# Patient Record
Sex: MALE | Race: WHITE | URBAN - METROPOLITAN AREA
[De-identification: names, ages, dates, MRNs, and addresses within clinical notes are randomized per-mention and may not be internally consistent; named-entity substitution may affect disease eponyms.]

---

## 2018-10-17 ENCOUNTER — APPOINTMENT (OUTPATIENT)
Age: 83
Setting detail: DERMATOLOGY
End: 2018-11-17

## 2018-10-17 DIAGNOSIS — L57.0 ACTINIC KERATOSIS: ICD-10-CM

## 2018-10-17 DIAGNOSIS — Z86.007 PERSONAL HISTORY OF IN-SITU NEOPLASM OF SKIN: ICD-10-CM

## 2018-10-17 DIAGNOSIS — Z71.89 OTHER SPECIFIED COUNSELING: ICD-10-CM

## 2018-10-17 PROBLEM — Z85.828 PERSONAL HISTORY OF OTHER MALIGNANT NEOPLASM OF SKIN: Status: ACTIVE | Noted: 2018-10-17

## 2018-10-17 PROBLEM — D48.5 NEOPLASM OF UNCERTAIN BEHAVIOR OF SKIN: Status: ACTIVE | Noted: 2018-10-17

## 2018-10-17 PROCEDURE — OTHER BIOPSY BY SHAVE METHOD: OTHER

## 2018-10-17 PROCEDURE — OTHER COUNSELING: OTHER

## 2018-10-17 PROCEDURE — 11100: CPT | Mod: 59

## 2018-10-17 PROCEDURE — 17003 DESTRUCT PREMALG LES 2-14: CPT

## 2018-10-17 PROCEDURE — OTHER DIAGNOSIS COMMENT: OTHER

## 2018-10-17 PROCEDURE — OTHER LIQUID NITROGEN: OTHER

## 2018-10-17 PROCEDURE — OTHER MIPS QUALITY: OTHER

## 2018-10-17 PROCEDURE — 11101: CPT

## 2018-10-17 PROCEDURE — 99213 OFFICE O/P EST LOW 20 MIN: CPT | Mod: 25

## 2018-10-17 PROCEDURE — 17000 DESTRUCT PREMALG LESION: CPT

## 2018-10-17 ASSESSMENT — LOCATION SIMPLE DESCRIPTION DERM
LOCATION SIMPLE: ANTERIOR SCALP
LOCATION SIMPLE: POSTERIOR SCALP
LOCATION SIMPLE: LEFT FOREHEAD
LOCATION SIMPLE: LEFT CHEEK
LOCATION SIMPLE: RIGHT TEMPLE
LOCATION SIMPLE: RIGHT FOREHEAD
LOCATION SIMPLE: RIGHT CHEEK
LOCATION SIMPLE: SCALP
LOCATION SIMPLE: LEFT SCALP
LOCATION SIMPLE: INFERIOR FOREHEAD

## 2018-10-17 ASSESSMENT — LOCATION DETAILED DESCRIPTION DERM
LOCATION DETAILED: RIGHT INFERIOR LATERAL MALAR CHEEK
LOCATION DETAILED: LEFT MEDIAL FRONTAL SCALP
LOCATION DETAILED: RIGHT SUPERIOR CENTRAL MALAR CHEEK
LOCATION DETAILED: RIGHT FOREHEAD
LOCATION DETAILED: POSTERIOR MID-PARIETAL SCALP
LOCATION DETAILED: LEFT CENTRAL MALAR CHEEK
LOCATION DETAILED: RIGHT CENTRAL TEMPLE
LOCATION DETAILED: LEFT INFERIOR FOREHEAD
LOCATION DETAILED: INFERIOR MID FOREHEAD
LOCATION DETAILED: RIGHT SUPERIOR PARIETAL SCALP
LOCATION DETAILED: MID-FRONTAL SCALP

## 2018-10-17 ASSESSMENT — LOCATION ZONE DERM
LOCATION ZONE: SCALP
LOCATION ZONE: FACE

## 2018-10-17 NOTE — PROCEDURE: MIPS QUALITY
Detail Level: Detailed
Quality 265: Biopsy Follow-Up: Biopsy results reviewed, communicated, tracked, and documented
Quality 130: Documentation Of Current Medications In The Medical Record: Current Medications Documented
Quality 226: Preventive Care And Screening: Tobacco Use: Screening And Cessation Intervention: Patient screened for tobacco and is an ex-smoker
Quality 110: Preventive Care And Screening: Influenza Immunization: Influenza Immunization previously received during influenza season
Quality 431: Preventive Care And Screening: Unhealthy Alcohol Use - Screening: Patient screened for unhealthy alcohol use using a single question and scores less than 2 times per year
Quality 131: Pain Assessment And Follow-Up: Pain assessment using a standardized tool is documented as negative, no follow-up plan required

## 2018-10-17 NOTE — PROCEDURE: BIOPSY BY SHAVE METHOD
Cryotherapy Text: The wound bed was treated with cryotherapy after the biopsy was performed.
Silver Nitrate Text: The wound bed was treated with silver nitrate after the biopsy was performed.
Size Of Lesion In Cm: 1
Billing Type: Third-Party Bill
Dressing: pressure dressing with telfa
Anesthesia Type: 1% lidocaine with epinephrine and a 1:10 solution of 8.4% sodium bicarbonate
Curettage Text: The wound bed was treated with curettage after the biopsy was performed.
Additional Anesthesia Volume In Cc (Will Not Render If 0): 0
Biopsy Type: H and E
Detail Level: Detailed
Bill For Surgical Tray: no
Body Location Override (Optional - Billing Will Still Be Based On Selected Body Map Location If Applicable): mid frontal scalp
Electrodesiccation Text: The wound bed was treated with electrodesiccation after the biopsy was performed.
Electrodesiccation And Curettage Text: The wound bed was treated with electrodesiccation and curettage after the biopsy was performed.
Depth Of Biopsy: dermis
Wound Care: Petrolatum
Consent: Verbal consent was obtained and risks were reviewed including but not limited to scarring, infection, bleeding, scabbing, incomplete removal, nerve damage and allergy to anesthesia.
Post-Care Instructions: I verbally reviewed with the patient in detail post-care instructions. Patient is to keep the biopsy site dry overnight, and then apply H2O2, Vaseline and a bandage daily until healed.
Body Location Override (Optional - Billing Will Still Be Based On Selected Body Map Location If Applicable): left mid frontal scalp
Was A Bandage Applied: Yes
Size Of Lesion In Cm: 1.2
Biopsy Method: double edge Personna blade
Anesthesia Volume In Cc (Will Not Render If 0): 0.5
Type Of Destruction Used: Electrodesiccation
Notification Instructions: Patient will be notified of biopsy results. However, patient instructed to call the office if not contacted within 2 weeks.
Hemostasis: Electrocautery
Body Location Override (Optional - Billing Will Still Be Based On Selected Body Map Location If Applicable): right vertex scalp
Size Of Lesion In Cm: 1.3

## 2018-10-17 NOTE — PROCEDURE: LIQUID NITROGEN
Duration Of Freeze Thaw-Cycle (Seconds): 10
Render Post-Care Instructions In Note?: yes
Detail Level: Detailed
Number Of Freeze-Thaw Cycles: 1 freeze-thaw cycle
Post-Care Instructions: I reviewed with the patient in detail post-care instructions. Patient is to wear sunprotection, and avoid picking at any of the treated lesions. Pt may apply Vaseline to crusted or scabbing areas.
Consent: The patient's consent was obtained including but not limited to risks of crusting, scabbing, blistering, scarring, darker or lighter pigmentary change, recurrence, incomplete removal and infection.

## 2018-12-17 ENCOUNTER — APPOINTMENT (OUTPATIENT)
Age: 83
Setting detail: DERMATOLOGY
End: 2019-01-01

## 2018-12-17 DIAGNOSIS — L57.0 ACTINIC KERATOSIS: ICD-10-CM

## 2018-12-17 DIAGNOSIS — L82.1 OTHER SEBORRHEIC KERATOSIS: ICD-10-CM

## 2018-12-17 PROCEDURE — OTHER DIAGNOSIS COMMENT: OTHER

## 2018-12-17 PROCEDURE — 99212 OFFICE O/P EST SF 10 MIN: CPT | Mod: 25

## 2018-12-17 PROCEDURE — 17000 DESTRUCT PREMALG LESION: CPT

## 2018-12-17 PROCEDURE — 17003 DESTRUCT PREMALG LES 2-14: CPT

## 2018-12-17 PROCEDURE — OTHER COUNSELING: OTHER

## 2018-12-17 PROCEDURE — OTHER MIPS QUALITY: OTHER

## 2018-12-17 PROCEDURE — OTHER LIQUID NITROGEN: OTHER

## 2018-12-17 ASSESSMENT — LOCATION DETAILED DESCRIPTION DERM
LOCATION DETAILED: MID-FRONTAL SCALP
LOCATION DETAILED: RIGHT SUPERIOR PARIETAL SCALP
LOCATION DETAILED: LEFT SUPERIOR MEDIAL FOREHEAD
LOCATION DETAILED: LEFT MEDIAL FRONTAL SCALP
LOCATION DETAILED: LEFT OCCIPITAL SCALP
LOCATION DETAILED: RIGHT SUPERIOR MEDIAL FOREHEAD
LOCATION DETAILED: LEFT SUPERIOR PARIETAL SCALP
LOCATION DETAILED: RIGHT CENTRAL PARIETAL SCALP
LOCATION DETAILED: MID-OCCIPITAL SCALP

## 2018-12-17 ASSESSMENT — LOCATION SIMPLE DESCRIPTION DERM
LOCATION SIMPLE: POSTERIOR SCALP
LOCATION SIMPLE: SCALP
LOCATION SIMPLE: RIGHT FOREHEAD
LOCATION SIMPLE: LEFT FOREHEAD
LOCATION SIMPLE: ANTERIOR SCALP
LOCATION SIMPLE: LEFT SCALP

## 2018-12-17 ASSESSMENT — LOCATION ZONE DERM
LOCATION ZONE: FACE
LOCATION ZONE: SCALP

## 2018-12-17 NOTE — PROCEDURE: LIQUID NITROGEN
Total Number Of Aks Treated: 10
Number Of Freeze-Thaw Cycles: 1 freeze-thaw cycle
Post-Care Instructions: I reviewed with the patient in detail post-care instructions. (Written instructions given) Patient is to wear sun protection, and avoid picking at any of the treated lesions. Pt may apply Vaseline to crusted or scabbing areas.
Render Post-Care Instructions In Note?: yes
Consent: The patient's consent was obtained including but not limited to risks of crusting, scabbing, blistering, scarring, darker or lighter pigmentary change, recurrence, incomplete removal and infection.
Detail Level: Detailed

## 2018-12-17 NOTE — PROCEDURE: MIPS QUALITY
Quality 226: Preventive Care And Screening: Tobacco Use: Screening And Cessation Intervention: Patient screened for tobacco and never smoked
Quality 131: Pain Assessment And Follow-Up: Pain assessment documented as positive using a standardized tool AND a follow-up plan is documented
Detail Level: Detailed
Quality 431: Preventive Care And Screening: Unhealthy Alcohol Use - Screening: Patient screened for unhealthy alcohol use using a single question and scores less than 2 times per year
Quality 110: Preventive Care And Screening: Influenza Immunization: Influenza Immunization previously received during influenza season
Quality 130: Documentation Of Current Medications In The Medical Record: Current Medications Documented

## 2019-06-10 ENCOUNTER — APPOINTMENT (OUTPATIENT)
Age: 84
Setting detail: DERMATOLOGY
End: 2019-06-30

## 2019-06-10 DIAGNOSIS — L82.1 OTHER SEBORRHEIC KERATOSIS: ICD-10-CM

## 2019-06-10 DIAGNOSIS — L81.4 OTHER MELANIN HYPERPIGMENTATION: ICD-10-CM

## 2019-06-10 DIAGNOSIS — D18.0 HEMANGIOMA: ICD-10-CM

## 2019-06-10 DIAGNOSIS — L57.0 ACTINIC KERATOSIS: ICD-10-CM

## 2019-06-10 DIAGNOSIS — Z71.89 OTHER SPECIFIED COUNSELING: ICD-10-CM

## 2019-06-10 DIAGNOSIS — Z85.828 PERSONAL HISTORY OF OTHER MALIGNANT NEOPLASM OF SKIN: ICD-10-CM

## 2019-06-10 DIAGNOSIS — B35.1 TINEA UNGUIUM: ICD-10-CM

## 2019-06-10 DIAGNOSIS — L21.8 OTHER SEBORRHEIC DERMATITIS: ICD-10-CM

## 2019-06-10 DIAGNOSIS — D22 MELANOCYTIC NEVI: ICD-10-CM

## 2019-06-10 DIAGNOSIS — B35.3 TINEA PEDIS: ICD-10-CM

## 2019-06-10 PROBLEM — D22.5 MELANOCYTIC NEVI OF TRUNK: Status: ACTIVE | Noted: 2019-06-10

## 2019-06-10 PROBLEM — D18.01 HEMANGIOMA OF SKIN AND SUBCUTANEOUS TISSUE: Status: ACTIVE | Noted: 2019-06-10

## 2019-06-10 PROBLEM — D48.5 NEOPLASM OF UNCERTAIN BEHAVIOR OF SKIN: Status: ACTIVE | Noted: 2019-06-10

## 2019-06-10 PROCEDURE — OTHER PRESCRIPTION: OTHER

## 2019-06-10 PROCEDURE — 11103 TANGNTL BX SKIN EA SEP/ADDL: CPT

## 2019-06-10 PROCEDURE — 99214 OFFICE O/P EST MOD 30 MIN: CPT | Mod: 25

## 2019-06-10 PROCEDURE — OTHER BIOPSY BY SHAVE METHOD: OTHER

## 2019-06-10 PROCEDURE — OTHER COUNSELING: OTHER

## 2019-06-10 PROCEDURE — OTHER MIPS QUALITY: OTHER

## 2019-06-10 PROCEDURE — OTHER LIQUID NITROGEN: OTHER

## 2019-06-10 PROCEDURE — 11102 TANGNTL BX SKIN SINGLE LES: CPT

## 2019-06-10 PROCEDURE — 17003 DESTRUCT PREMALG LES 2-14: CPT

## 2019-06-10 PROCEDURE — OTHER DIAGNOSIS COMMENT: OTHER

## 2019-06-10 PROCEDURE — 17000 DESTRUCT PREMALG LESION: CPT | Mod: 59

## 2019-06-10 RX ORDER — HYDROCORTISONE 25 MG/G
APPLY THIN COAT CREAM TOPICAL BID, PRN
Qty: 20 | Refills: 1 | Status: ERX | COMMUNITY
Start: 2019-06-10

## 2019-06-10 ASSESSMENT — LOCATION SIMPLE DESCRIPTION DERM
LOCATION SIMPLE: LEFT GREAT TOE
LOCATION SIMPLE: LEFT ANTERIOR NECK
LOCATION SIMPLE: LEFT CHEEK
LOCATION SIMPLE: RIGHT UPPER BACK
LOCATION SIMPLE: POSTERIOR SCALP
LOCATION SIMPLE: UPPER BACK
LOCATION SIMPLE: RIGHT CHEEK
LOCATION SIMPLE: LEFT PLANTAR SURFACE
LOCATION SIMPLE: RIGHT GREAT TOE
LOCATION SIMPLE: RIGHT SCALP
LOCATION SIMPLE: RIGHT PLANTAR SURFACE
LOCATION SIMPLE: RIGHT LIP
LOCATION SIMPLE: SCALP

## 2019-06-10 ASSESSMENT — LOCATION DETAILED DESCRIPTION DERM
LOCATION DETAILED: RIGHT SUPERIOR MEDIAL UPPER BACK
LOCATION DETAILED: LEFT CENTRAL MALAR CHEEK
LOCATION DETAILED: RIGHT CENTRAL PARIETAL SCALP
LOCATION DETAILED: RIGHT INFERIOR MEDIAL MALAR CHEEK
LOCATION DETAILED: RIGHT PLANTAR FOREFOOT OVERLYING 2ND METATARSAL
LOCATION DETAILED: LEFT PLANTAR FOREFOOT OVERLYING 3RD METATARSAL
LOCATION DETAILED: SUPERIOR THORACIC SPINE
LOCATION DETAILED: RIGHT SUPERIOR PARIETAL SCALP
LOCATION DETAILED: RIGHT INFERIOR VERMILION LIP
LOCATION DETAILED: RIGHT INFERIOR CENTRAL MALAR CHEEK
LOCATION DETAILED: RIGHT SUPERIOR OCCIPITAL SCALP
LOCATION DETAILED: RIGHT MEDIAL FRONTAL SCALP
LOCATION DETAILED: RIGHT MEDIAL MALAR CHEEK
LOCATION DETAILED: MID-OCCIPITAL SCALP
LOCATION DETAILED: LEFT CLAVICULAR NECK
LOCATION DETAILED: RIGHT GREAT TOENAIL
LOCATION DETAILED: LEFT MEDIAL MALAR CHEEK
LOCATION DETAILED: LEFT GREAT TOENAIL
LOCATION DETAILED: LEFT SUPERIOR LATERAL MALAR CHEEK

## 2019-06-10 ASSESSMENT — LOCATION ZONE DERM
LOCATION ZONE: TRUNK
LOCATION ZONE: LIP
LOCATION ZONE: TOENAIL
LOCATION ZONE: FEET
LOCATION ZONE: NECK
LOCATION ZONE: FACE
LOCATION ZONE: SCALP

## 2019-06-10 NOTE — PROCEDURE: DIAGNOSIS COMMENT
Comment: Procedure was performed by Dr. Hayley Cho, DNP APRN NP-C
Comment: Procedure was performed by Dr. Hayley Cho, DNP APRN NP-C
Detail Level: Simple
Comment: Procedure was performed by Dr. Hayley Cho, DNP APRN NP-C

## 2019-06-10 NOTE — PROCEDURE: BIOPSY BY SHAVE METHOD
Was A Bandage Applied: Yes
Anesthesia Type: 1% lidocaine with epinephrine and a 1:10 solution of 8.4% sodium bicarbonate
Anesthesia Volume In Cc (Will Not Render If 0): 0.5
Type Of Destruction Used: Electrodesiccation
Billing Type: Third-Party Bill
Bill 80335 For Specimen Handling/Conveyance To Laboratory?: no
Depth Of Biopsy: dermis
Post-Care Instructions: I verbally reviewed with the patient in detail post-care instructions. Patient is to keep the biopsy site dry overnight, and then apply H2O2, Vaseline and a bandage daily until healed.
Curettage Text: The wound bed was treated with curettage after the biopsy was performed.
Cryotherapy Text: The wound bed was treated with cryotherapy after the biopsy was performed.
Dressing: pressure dressing with telfa
Wound Care: Petrolatum
Anesthesia Volume In Cc (Will Not Render If 0): 0.8
Electrodesiccation And Curettage Text: The wound bed was treated with electrodesiccation and curettage after the biopsy was performed.
Consent: Verbal consent was obtained and risks were reviewed including but not limited to scarring, infection, bleeding, scabbing, incomplete removal, nerve damage and allergy to anesthesia.
X Size Of Lesion In Cm: 0.7
Size Of Lesion In Cm: 0.9
Body Location Override (Optional - Billing Will Still Be Based On Selected Body Map Location If Applicable): central parietal scalp
Size Of Lesion In Cm: 1
Body Location Override (Optional - Billing Will Still Be Based On Selected Body Map Location If Applicable): vertex scalp
Electrodesiccation Text: The wound bed was treated with electrodesiccation after the biopsy was performed.
Silver Nitrate Text: The wound bed was treated with silver nitrate after the biopsy was performed.
Hemostasis: Electrocautery
Biopsy Type: H and E
Additional Anesthesia Volume In Cc (Will Not Render If 0): 0
Notification Instructions: Patient will be notified of biopsy results. However, patient instructed to call the office if not contacted within 2 weeks.
Detail Level: Detailed
Biopsy Method: double edge Personna blade

## 2019-06-10 NOTE — PROCEDURE: LIQUID NITROGEN
Post-Care Instructions: I reviewed with the patient in detail post-care instructions. Patient is to wear sunprotection, and avoid picking at any of the treated lesions. Pt may apply Vaseline to crusted or scabbing areas.
Duration Of Freeze Thaw-Cycle (Seconds): 15
Consent: The patient's consent was obtained including but not limited to risks of crusting, scabbing, blistering, scarring, darker or lighter pigmentary change, recurrence, incomplete removal and infection.
Detail Level: Detailed
Render Post-Care Instructions In Note?: yes
Number Of Freeze-Thaw Cycles: 1 freeze-thaw cycle
Render Note In Bullet Format When Appropriate: No

## 2019-06-10 NOTE — PROCEDURE: COUNSELING
Detail Level: Detailed
Detail Level: Generalized
Detail Level: Zone
Patient Specific Counseling (Will Not Stick From Patient To Patient): Recommended to use T sal shampoo

## 2019-06-10 NOTE — PROCEDURE: MIPS QUALITY
Detail Level: Detailed
Quality 265: Biopsy Follow-Up: Biopsy results reviewed, communicated, tracked, and documented
Quality 130: Documentation Of Current Medications In The Medical Record: Current Medications Documented
Quality 131: Pain Assessment And Follow-Up: Pain assessment documented as positive using a standardized tool AND a follow-up plan is documented
Quality 110: Preventive Care And Screening: Influenza Immunization: Influenza Immunization previously received during influenza season
Quality 226: Preventive Care And Screening: Tobacco Use: Screening And Cessation Intervention: Patient screened for tobacco and is an ex-smoker
Quality 431: Preventive Care And Screening: Unhealthy Alcohol Use - Screening: Patient screened for unhealthy alcohol use using a single question and scores less than 2 times per year

## 2019-07-02 ENCOUNTER — APPOINTMENT (OUTPATIENT)
Age: 84
Setting detail: DERMATOLOGY
End: 2019-07-03

## 2019-07-02 DIAGNOSIS — Z71.89 OTHER SPECIFIED COUNSELING: ICD-10-CM

## 2019-07-02 PROBLEM — D04.4 CARCINOMA IN SITU OF SKIN OF SCALP AND NECK: Status: ACTIVE | Noted: 2019-07-02

## 2019-07-02 PROCEDURE — 17311 MOHS 1 STAGE H/N/HF/G: CPT | Mod: 76

## 2019-07-02 PROCEDURE — OTHER MIPS QUALITY: OTHER

## 2019-07-02 PROCEDURE — OTHER COUNSELING: OTHER

## 2019-07-02 PROCEDURE — 17311 MOHS 1 STAGE H/N/HF/G: CPT

## 2019-07-02 PROCEDURE — OTHER MOHS SURGERY: OTHER

## 2019-07-02 NOTE — PROCEDURE: MOHS SURGERY
O-L Flap Text: In order to preserve normal anatomic and functional relationships, an O-L plasty advancement flap was deemed the most appropriate reconstructive procedure.  The beveled edges of the Mohs defect were excised with a #15 scalpel blade.    The flap was designed to fall along relaxed skin tension lines and/or free margins, incised, and elevated using blunt curved tissue scissors.  Hemostasis was achieved.  Interrupted buried vertical mattress sutures were employed to secure the flap in place.  Redundant cutaneous columns defined by the flap's movement were removed to the adipose layer using the triangulation technique and repaired in similar fashion.  Final epidermal approximation along the length of the flap was achieved using epidermal sutures.  The wound was stressed in various directions to ensure the adequacy of the closure and of hemostasis.  The flap edges appeared pink and well perfused.  Reconstruction was considered complete.

## 2019-07-02 NOTE — PROCEDURE: MIPS QUALITY
Detail Level: Detailed
Quality 130: Documentation Of Current Medications In The Medical Record: Current Medications Documented
Quality 431: Preventive Care And Screening: Unhealthy Alcohol Use - Screening: Patient screened for unhealthy alcohol use using a single question and scores less than 2 times per year
Quality 474: Zoster Vaccination Status: Shingrix Vaccination Administered or Previously Received
Quality 143: Oncology: Medical And Radiation- Pain Intensity Quantified: Pain severity quantified, no pain present
Quality 226: Preventive Care And Screening: Tobacco Use: Screening And Cessation Intervention: Patient screened for tobacco and is an ex-smoker
Quality 110: Preventive Care And Screening: Influenza Immunization: Influenza Immunization previously received during influenza season

## 2019-07-02 NOTE — PROCEDURE: MOHS SURGERY
Body Location Override (Optional - Billing Will Still Be Based On Selected Body Map Location If Applicable): Central Parietal Scalp

## 2019-07-02 NOTE — PROCEDURE: MOHS SURGERY
Graft Cartilage Fenestration Text: The cartilage was fenestrated with a 22g needle to help facilitate graft survival.

## 2019-07-02 NOTE — PROCEDURE: MOHS SURGERY
Smoking Cessation Group Session #3  Site: [facility]  Date:  3-  Clinical Status of Patient: Outpatient   Length of Service and Code: 90 minutes - 33027   Number in Attendance: 5  Group Activities/Focus of Group:  Sharing last weeks challenges, triggers, and coping activities to remain quit and/ or keep making progress toward cessation, completion of TCRS (Tobacco Cessation Rating Scale) learned addiction model, personal reasons for quitting, medications, goals, quit date.    Specific session focus: completion of TCRS (Tobacco Cessation Rating Scale) reviewed strategies, controlling environment, cues, triggers, new goals set. Introduced high risk situations with preparation interventions, caffeine similarities with withdrawal issues of habit and nicotine, Alcohol, Understanding urges, cravings, stress and relaxation. Open discussion with intervention discussion.    Target symptoms:  withdrawal and medication side effects             The following were rated moderate (3) to severe (4) on TCRS:       Moderate 3: restless, anxious, desire tobacco     Severe 4:   none  Patient's Response to Intervention: The patient remains on the prescribed tobacco cessation medication regimen of 1 mg Chantix BID without any negative side effects at this time.   Progress Toward Goals and Other Mental Status Changes:The patient denies any abnormal behavioral or mental changes at this time.   Interval History: Patient remains tobacco free at this time.    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 Patient will inform Smoking Cessation Counselor of symptoms as rated high on TCRS.    Return to Clinic: 1 week    Quit Date: 3-   Dressing (No Sutures): dry sterile dressing

## 2019-07-02 NOTE — PROCEDURE: MOHS SURGERY
Mucosal Advancement Flap Text: In order to preserve normal anatomic and functional relationships, a single advancement flap was deemed the most appropriate reconstructive procedure.  The beveled edges of the Mohs defect were excised with a #15 scalpel blade.    The flap was designed to fall along relaxed skin tension lines and/or free margins (vermilion border), incised, and elevated using blunt curved tissue scissors.  Hemostasis was achieved.  Interrupted buried vertical mattress sutures were employed to secure the flap in place.  Redundant cutaneous columns defined by the flap's movement were removed to the adipose layer using the triangulation technique and repaired in similar fashion.  Final epidermal approximation along the length of the flap was achieved using epidermal sutures.  The wound was stressed in various directions to ensure the adequacy of the closure and of hemostasis.  The flap edges appeared pink and well perfused.  Reconstruction was considered complete.

## 2019-07-02 NOTE — PROCEDURE: MOHS SURGERY
Cartilage Graft Text: Given the location, size, depth of the defect, and its propinquity to the alar rim, a free cartilage graft was deemed the most appropriate reconstructive option.  An appropriate donor site was identified, cleansed, and anesthetized. The cartilage graft was then harvested with periosteum intact and transferred to the recipient site and oriented appropriately within the wound and prepared pockets. A single polypropylene suture was placed through and through the cartilage graft and the subtending wound bed.  The perichondrium and wound edges were apposed using additional polypropylene sutures. The cartilage graft was then multiply perforated using a 30g needle to facilitate graft vascularization.  The secondary defect was then repaired using a simple closure.  Reconstruction was considered complete.

## 2019-07-02 NOTE — PROCEDURE: MOHS SURGERY
Complex Repair Preamble Text (Leave Blank If You Do Not Want): Extensive wide and differential undermining were performed to facilitate wound closure and reduce wound tension.  During reconstruction, hemostasis was performed using electrofulguration.  Initial buried interrupted vertical mattress suture(s) defined redundant cutaneous columns (Burow's triangles) which were removed to the level of the adipose tissue using a #15 blade scalpel and the triangulation technique.  Hemostasis was obtained and the resulting defect was repaired with the same suture material and techniques.  The epidermis was then carefully apposed using polypropylene suture (running).  The wound was stressed in various directions to insure the adequacy of closure and of hemostasis.  The wound edges were pink and well perfused.  Reconstruction was considered complete.

## 2019-07-02 NOTE — PROCEDURE: MOHS SURGERY
Body Location Override (Optional - Billing Will Still Be Based On Selected Body Map Location If Applicable): Vertex Scalp

## 2019-07-03 ENCOUNTER — APPOINTMENT (OUTPATIENT)
Age: 84
Setting detail: DERMATOLOGY
End: 2019-07-05

## 2019-07-03 DIAGNOSIS — L76.21 POSTPROCEDURAL HEMORRHAGE OF SKIN AND SUBCUTANEOUS TISSUE FOLLOWING A DERMATOLOGIC PROCEDURE: ICD-10-CM

## 2019-07-03 PROCEDURE — 99212 OFFICE O/P EST SF 10 MIN: CPT

## 2019-07-03 PROCEDURE — OTHER DRESSING CHANGE: OTHER

## 2019-07-03 ASSESSMENT — LOCATION SIMPLE DESCRIPTION DERM
LOCATION SIMPLE: POSTERIOR SCALP
LOCATION SIMPLE: ANTERIOR SCALP

## 2019-07-03 ASSESSMENT — LOCATION DETAILED DESCRIPTION DERM
LOCATION DETAILED: POSTERIOR MID-PARIETAL SCALP
LOCATION DETAILED: MID-FRONTAL SCALP

## 2019-07-03 ASSESSMENT — LOCATION ZONE DERM: LOCATION ZONE: SCALP

## 2019-07-03 NOTE — HPI: SURGICAL COMPLICATION (BLEEDING)
Additional History: The patient's wife noted bleeding which began earlier today during wound care after Mohs surgical excision of two SCCs on his scalp yesterday.

## 2019-07-03 NOTE — PROCEDURE: DRESSING CHANGE
Detail Level: Detailed
Wound Evaluated By: NICHOLAS Rudd NP-C and Haleigh Cho MD
Add 49582 Cpt? (Important Note: In 2017 The Use Of 10577 Is Being Tracked By Cms To Determine Future Global Period Reimbursement For Global Periods): yes
Add 85930 Cpt? (Important Note: In 2017 The Use Of 57908 Is Being Tracked By Cms To Determine Future Global Period Reimbursement For Global Periods): no
Wound Evaluated By: NICHOLAS Rudd NP-C and Haleigh Cho MD
Body Location Override (Optional - Billing Will Still Be Based On Selected Body Map Location If Applicable): Vertex and Central Parietal Scalp

## 2019-07-16 ENCOUNTER — APPOINTMENT (OUTPATIENT)
Age: 84
Setting detail: DERMATOLOGY
End: 2019-08-12

## 2019-07-16 DIAGNOSIS — L98429 CHRONIC ULCER OF OTHER SPECIFIED SITES: ICD-10-CM

## 2019-07-16 DIAGNOSIS — L98419 CHRONIC ULCER OF OTHER SPECIFIED SITES: ICD-10-CM

## 2019-07-16 PROBLEM — L98.499 NON-PRESSURE CHRONIC ULCER OF SKIN OF OTHER SITES WITH UNSPECIFIED SEVERITY: Status: ACTIVE | Noted: 2019-07-16

## 2019-07-16 PROCEDURE — OTHER POST-OP WOUND EVALUATION: OTHER

## 2019-07-16 PROCEDURE — OTHER MIPS QUALITY: OTHER

## 2019-07-16 PROCEDURE — OTHER DIAGNOSIS COMMENT: OTHER

## 2019-07-16 PROCEDURE — OTHER COUNSELING: OTHER

## 2019-07-16 PROCEDURE — 99213 OFFICE O/P EST LOW 20 MIN: CPT

## 2019-07-16 ASSESSMENT — LOCATION DETAILED DESCRIPTION DERM: LOCATION DETAILED: MID-FRONTAL SCALP

## 2019-07-16 ASSESSMENT — LOCATION ZONE DERM: LOCATION ZONE: SCALP

## 2019-07-16 ASSESSMENT — LOCATION SIMPLE DESCRIPTION DERM: LOCATION SIMPLE: ANTERIOR SCALP

## 2019-07-16 NOTE — PROCEDURE: MIPS QUALITY
Detail Level: Detailed
Quality 130: Documentation Of Current Medications In The Medical Record: Current Medications Documented
Quality 110: Preventive Care And Screening: Influenza Immunization: Influenza Immunization previously received during influenza season
Quality 131: Pain Assessment And Follow-Up: Pain assessment documented as positive using a standardized tool AND a follow-up plan is documented
Quality 431: Preventive Care And Screening: Unhealthy Alcohol Use - Screening: Patient screened for unhealthy alcohol use using a single question and scores less than 2 times per year
Quality 474: Zoster Vaccination Status: Shingrix Vaccination not Administered or Previously Received, Reason not Otherwise Specified
Quality 226: Preventive Care And Screening: Tobacco Use: Screening And Cessation Intervention: Patient screened for tobacco and is an ex-smoker

## 2019-07-16 NOTE — PROCEDURE: DIAGNOSIS COMMENT
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
Detail Level: Simple
Comment: Primary SCC in situ, central parietal scalp, S/P MMS/2nd (07/02/19) \\nWound measurements:1.7 x 1.5 cm\\n

## 2019-07-16 NOTE — PROCEDURE: POST-OP WOUND EVALUATION
Wound Evaluated By (Optional): Hayley Cho, DNP APRN NPC and Haleigh Cho, MPhil, MD
Lymphadenopathy?: absent
Detail Level: Detailed
Wound Crusting?: clean
Follow Up Time Frame (Optional): days
Wound Drainage?: serous drainage
Wound Discharge?: minimal discharge
Add 23530 Cpt? (Important Note: In 2017 The Use Of 32540 Is Being Tracked By Cms To Determine Future Global Period Reimbursement For Global Periods): no
Wound Edema?: mild
Follow Up Units (Optional): 10
Wound Granulation?: early
Wound Diameter In Cm(Optional): 1.5
Patient To Follow-Up With?: our clinic
Body Location Override (Optional): central vertex scalp
Wound Diameter In Cm(Optional): 1.7
Wound Evaluated By (Optional): Hayley Cho, DNP APRN NPC and Haleigh Cho, MPhil, MD
Body Location Override (Optional): central parietal scalp
Wound Color?: pink

## 2019-07-25 ENCOUNTER — APPOINTMENT (OUTPATIENT)
Age: 84
Setting detail: DERMATOLOGY
End: 2019-08-18

## 2019-07-25 DIAGNOSIS — Z71.89 OTHER SPECIFIED COUNSELING: ICD-10-CM

## 2019-07-25 DIAGNOSIS — L98419 CHRONIC ULCER OF OTHER SPECIFIED SITES: ICD-10-CM

## 2019-07-25 DIAGNOSIS — L98429 CHRONIC ULCER OF OTHER SPECIFIED SITES: ICD-10-CM

## 2019-07-25 PROBLEM — L98.499 NON-PRESSURE CHRONIC ULCER OF SKIN OF OTHER SITES WITH UNSPECIFIED SEVERITY: Status: ACTIVE | Noted: 2019-07-25

## 2019-07-25 PROCEDURE — OTHER POST-OP WOUND EVALUATION: OTHER

## 2019-07-25 PROCEDURE — OTHER MIPS QUALITY: OTHER

## 2019-07-25 PROCEDURE — OTHER DIAGNOSIS COMMENT: OTHER

## 2019-07-25 PROCEDURE — 99213 OFFICE O/P EST LOW 20 MIN: CPT

## 2019-07-25 PROCEDURE — OTHER COUNSELING: OTHER

## 2019-07-25 ASSESSMENT — LOCATION SIMPLE DESCRIPTION DERM
LOCATION SIMPLE: POSTERIOR SCALP
LOCATION SIMPLE: ANTERIOR SCALP

## 2019-07-25 ASSESSMENT — LOCATION DETAILED DESCRIPTION DERM
LOCATION DETAILED: MID-FRONTAL SCALP
LOCATION DETAILED: POSTERIOR MID-PARIETAL SCALP

## 2019-07-25 ASSESSMENT — LOCATION ZONE DERM: LOCATION ZONE: SCALP

## 2019-07-25 NOTE — PROCEDURE: DIAGNOSIS COMMENT
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
Detail Level: Simple
Comment: Primary SCC in situ, central parietal scalp, S/P MMS/2nd (07/02/19)\\nWound measurements:1.5 x 1.3 cm\\n

## 2019-07-25 NOTE — PROCEDURE: POST-OP WOUND EVALUATION
Wound Edema?: mild
Lymphadenopathy?: absent
Wound Evaluated By (Optional): Hayley Cho, DNP APRN NPC and Haleigh Cho, MPhil, MD
Wound Discharge?: minimal discharge
Body Location Override (Optional): central vertex scalp
Wound Evaluated By (Optional): Hayley Cho, DNP APRN NPC and Haleigh Cho, MPhil, MD
Wound Granulation?: early
Wound Drainage?: serous drainage
Wound Crusting?: clean
Wound Color?: red
Follow Up Units (Optional): 1
Patient To Follow-Up With?: our clinic
Follow Up Time Frame (Optional): months
Detail Level: Detailed
Wound Diameter In Cm(Optional): 1.5
Add 57960 Cpt? (Important Note: In 2017 The Use Of 33436 Is Being Tracked By Cms To Determine Future Global Period Reimbursement For Global Periods): no
Body Location Override (Optional): central parietal scalp
Wound Diameter In Cm(Optional): 1.3

## 2019-07-25 NOTE — PROCEDURE: MIPS QUALITY
Detail Level: Detailed
Quality 110: Preventive Care And Screening: Influenza Immunization: Influenza Immunization previously received during influenza season
Quality 131: Pain Assessment And Follow-Up: Pain assessment using a standardized tool is documented as negative, no follow-up plan required
Quality 130: Documentation Of Current Medications In The Medical Record: Current Medications Documented
Quality 474: Zoster Vaccination Status: Shingrix Vaccination not Administered or Previously Received, Reason not Otherwise Specified
Quality 431: Preventive Care And Screening: Unhealthy Alcohol Use - Screening: Patient screened for unhealthy alcohol use using a single question and scores less than 2 times per year
Quality 226: Preventive Care And Screening: Tobacco Use: Screening And Cessation Intervention: Patient screened for tobacco and is an ex-smoker

## 2019-08-08 ENCOUNTER — APPOINTMENT (OUTPATIENT)
Age: 84
Setting detail: DERMATOLOGY
End: 2019-08-12

## 2019-08-08 DIAGNOSIS — L98419 CHRONIC ULCER OF OTHER SPECIFIED SITES: ICD-10-CM

## 2019-08-08 DIAGNOSIS — Z71.89 OTHER SPECIFIED COUNSELING: ICD-10-CM

## 2019-08-08 DIAGNOSIS — L98429 CHRONIC ULCER OF OTHER SPECIFIED SITES: ICD-10-CM

## 2019-08-08 PROBLEM — L98.499 NON-PRESSURE CHRONIC ULCER OF SKIN OF OTHER SITES WITH UNSPECIFIED SEVERITY: Status: ACTIVE | Noted: 2019-08-08

## 2019-08-08 PROCEDURE — OTHER MIPS QUALITY: OTHER

## 2019-08-08 PROCEDURE — OTHER COUNSELING: OTHER

## 2019-08-08 PROCEDURE — OTHER DIAGNOSIS COMMENT: OTHER

## 2019-08-08 PROCEDURE — 99213 OFFICE O/P EST LOW 20 MIN: CPT

## 2019-08-08 PROCEDURE — OTHER POST-OP WOUND EVALUATION: OTHER

## 2019-08-08 ASSESSMENT — LOCATION DETAILED DESCRIPTION DERM
LOCATION DETAILED: MID-FRONTAL SCALP
LOCATION DETAILED: RIGHT SUPERIOR PARIETAL SCALP

## 2019-08-08 ASSESSMENT — LOCATION SIMPLE DESCRIPTION DERM
LOCATION SIMPLE: SCALP
LOCATION SIMPLE: ANTERIOR SCALP

## 2019-08-08 ASSESSMENT — LOCATION ZONE DERM: LOCATION ZONE: SCALP

## 2019-08-08 NOTE — PROCEDURE: DIAGNOSIS COMMENT
Detail Level: Simple
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
Comment: Primary SCC in situ, central parietal scalp, S/P MMS/2nd (07/02/19) \\nWound measurement 1.4 x 1.4 cm

## 2019-08-08 NOTE — PROCEDURE: MIPS QUALITY
Detail Level: Detailed
Quality 431: Preventive Care And Screening: Unhealthy Alcohol Use - Screening: Patient screened for unhealthy alcohol use using a single question and scores less than 2 times per year
Quality 130: Documentation Of Current Medications In The Medical Record: Current Medications Documented
Quality 474: Zoster Vaccination Status: Shingrix Vaccination not Administered or Previously Received, Reason not Otherwise Specified

## 2019-08-08 NOTE — PROCEDURE: POST-OP WOUND EVALUATION
Add 87750 Cpt? (Important Note: In 2017 The Use Of 85656 Is Being Tracked By Cms To Determine Future Global Period Reimbursement For Global Periods): no
Wound Diameter In Cm(Optional): 1.3
Detail Level: Detailed
Wound Evaluated By (Optional): Hayley Cho, DNP APRN NPC
Percent Granulation (Optional): 100
Patient To Follow-Up With?: our clinic
Wound Diameter In Cm(Optional): 1.4
Wound Crusting?: clean
Wound Drainage?: serous drainage
Wound Discharge?: minimal discharge
Lymphadenopathy?: absent
Follow Up Time Frame (Optional): weeks
Wound Evaluated By (Optional): Hayley Cho, DNP APRN NPC
Wound Color?: pink
Body Location Override (Optional): Central Parietal Scalp
Follow Up Units (Optional): 2
Wound Edema?: mild
Wound Color?: red
Body Location Override (Optional): Central Vertex Scalp
Follow Up Time Frame (Optional): days

## 2019-08-22 ENCOUNTER — APPOINTMENT (OUTPATIENT)
Age: 84
Setting detail: DERMATOLOGY
End: 2019-09-02

## 2019-08-22 DIAGNOSIS — L98419 CHRONIC ULCER OF OTHER SPECIFIED SITES: ICD-10-CM

## 2019-08-22 DIAGNOSIS — Z71.89 OTHER SPECIFIED COUNSELING: ICD-10-CM

## 2019-08-22 DIAGNOSIS — L98429 CHRONIC ULCER OF OTHER SPECIFIED SITES: ICD-10-CM

## 2019-08-22 DIAGNOSIS — L81.7 PIGMENTED PURPURIC DERMATOSIS: ICD-10-CM

## 2019-08-22 PROBLEM — L98.499 NON-PRESSURE CHRONIC ULCER OF SKIN OF OTHER SITES WITH UNSPECIFIED SEVERITY: Status: ACTIVE | Noted: 2019-08-22

## 2019-08-22 PROCEDURE — OTHER MIPS QUALITY: OTHER

## 2019-08-22 PROCEDURE — OTHER COUNSELING: OTHER

## 2019-08-22 PROCEDURE — OTHER POST-OP WOUND EVALUATION: OTHER

## 2019-08-22 PROCEDURE — 99213 OFFICE O/P EST LOW 20 MIN: CPT

## 2019-08-22 PROCEDURE — OTHER DIAGNOSIS COMMENT: OTHER

## 2019-08-22 ASSESSMENT — LOCATION SIMPLE DESCRIPTION DERM
LOCATION SIMPLE: SCALP
LOCATION SIMPLE: ANTERIOR SCALP
LOCATION SIMPLE: LEFT ANKLE
LOCATION SIMPLE: RIGHT ANKLE

## 2019-08-22 ASSESSMENT — LOCATION ZONE DERM
LOCATION ZONE: SCALP
LOCATION ZONE: LEG

## 2019-08-22 ASSESSMENT — LOCATION DETAILED DESCRIPTION DERM
LOCATION DETAILED: RIGHT SUPERIOR PARIETAL SCALP
LOCATION DETAILED: LEFT ANKLE
LOCATION DETAILED: MID-FRONTAL SCALP
LOCATION DETAILED: RIGHT ANKLE

## 2019-08-22 NOTE — PROCEDURE: COUNSELING
Patient Specific Counseling (Will Not Stick From Patient To Patient): Patient provided with compression stocking prescription \\nand can apply 2.5% hydrocortisone cream ( they have at home) BID, PRN itch
Detail Level: Simple
Detail Level: Detailed

## 2019-08-22 NOTE — PROCEDURE: MIPS QUALITY
Quality 431: Preventive Care And Screening: Unhealthy Alcohol Use - Screening: Patient screened for unhealthy alcohol use using a single question and scores less than 2 times per year
Quality 130: Documentation Of Current Medications In The Medical Record: Current Medications Documented
Detail Level: Detailed
Quality 474: Zoster Vaccination Status: Shingrix Vaccination not Administered or Previously Received, Reason not Otherwise Specified

## 2019-08-22 NOTE — PROCEDURE: POST-OP WOUND EVALUATION
Follow Up Time Frame (Optional): months
Wound Diameter In Cm(Optional): 0.8
Wound Diameter In Cm(Optional): 0.9
Wound Edema?: mild
Percent Granulation (Optional): 100
Detail Level: Detailed
Wound Crusting?: clean
Patient To Follow-Up With?: our clinic
Follow Up Units (Optional): 1
Body Location Override (Optional): Central Parietal Scalp
Wound Drainage?: serous drainage
Wound Evaluated By (Optional): Hayley Cho, DNP APRN NPC
Wound Color?: red
Wound Evaluated By (Optional): Hayley Cho, DNP APRN NPC
Add 48448 Cpt? (Important Note: In 2017 The Use Of 19228 Is Being Tracked By Cms To Determine Future Global Period Reimbursement For Global Periods): no
Wound Color?: pink
Wound Discharge?: minimal discharge
Lymphadenopathy?: absent
Body Location Override (Optional): Central Vertex Scalp

## 2019-08-22 NOTE — PROCEDURE: DIAGNOSIS COMMENT
Detail Level: Simple
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
Comment: Primary SCC in situ, central vertex scalp, S/P MMS/2nd (07/02/19)\\nWound measurement 0.8x0.6cm

## 2019-09-19 ENCOUNTER — APPOINTMENT (OUTPATIENT)
Age: 84
Setting detail: DERMATOLOGY
End: 2019-09-23

## 2019-09-19 DIAGNOSIS — L81.7 PIGMENTED PURPURIC DERMATOSIS: ICD-10-CM

## 2019-09-19 DIAGNOSIS — L98419 CHRONIC ULCER OF OTHER SPECIFIED SITES: ICD-10-CM

## 2019-09-19 DIAGNOSIS — Z71.89 OTHER SPECIFIED COUNSELING: ICD-10-CM

## 2019-09-19 DIAGNOSIS — L98429 CHRONIC ULCER OF OTHER SPECIFIED SITES: ICD-10-CM

## 2019-09-19 PROBLEM — L98.499 NON-PRESSURE CHRONIC ULCER OF SKIN OF OTHER SITES WITH UNSPECIFIED SEVERITY: Status: ACTIVE | Noted: 2019-09-19

## 2019-09-19 PROCEDURE — OTHER COUNSELING: OTHER

## 2019-09-19 PROCEDURE — OTHER MIPS QUALITY: OTHER

## 2019-09-19 PROCEDURE — OTHER POST-OP WOUND EVALUATION: OTHER

## 2019-09-19 PROCEDURE — 99213 OFFICE O/P EST LOW 20 MIN: CPT

## 2019-09-19 PROCEDURE — OTHER DIAGNOSIS COMMENT: OTHER

## 2019-09-19 ASSESSMENT — LOCATION ZONE DERM
LOCATION ZONE: LEG
LOCATION ZONE: SCALP

## 2019-09-19 ASSESSMENT — LOCATION SIMPLE DESCRIPTION DERM
LOCATION SIMPLE: LEFT ANKLE
LOCATION SIMPLE: SCALP
LOCATION SIMPLE: RIGHT ANKLE

## 2019-09-19 ASSESSMENT — LOCATION DETAILED DESCRIPTION DERM
LOCATION DETAILED: RIGHT ANKLE
LOCATION DETAILED: LEFT ANKLE
LOCATION DETAILED: RIGHT SUPERIOR PARIETAL SCALP

## 2019-09-19 NOTE — PROCEDURE: MIPS QUALITY
Detail Level: Detailed
Quality 130: Documentation Of Current Medications In The Medical Record: Current Medications Documented
Quality 110: Preventive Care And Screening: Influenza Immunization: Influenza Immunization previously received during influenza season
Quality 431: Preventive Care And Screening: Unhealthy Alcohol Use - Screening: Patient screened for unhealthy alcohol use using a single question and scores less than 2 times per year
Quality 474: Zoster Vaccination Status: Shingrix Vaccination not Administered or Previously Received, Reason not Otherwise Specified

## 2019-09-19 NOTE — PROCEDURE: COUNSELING
Detail Level: Detailed
Patient Specific Counseling (Will Not Stick From Patient To Patient): Patient provided with compression stocking prescription \\nand can apply 2.5% hydrocortisone cream ( they have at home) BID, PRN itch
Patient Specific Counseling (Will Not Stick From Patient To Patient): continue wound care daily consisting of hydrogen peroxide, vaseline and cover with a bandage for one week.
Detail Level: Simple

## 2019-09-19 NOTE — PROCEDURE: DIAGNOSIS COMMENT
Comment: Central vertex scalp Wound resulting from Mohs of a Primary SCC in situ, healing by 2nd intent (07/02/19)
Comment: Central parietal scalp Wound resulting from Mohs of a Primary SCC in situ,healing by 2nd intent (07/02/19)
Detail Level: Simple

## 2019-09-19 NOTE — PROCEDURE: POST-OP WOUND EVALUATION
Percent Granulation (Optional): 10
Wound Color?: pink
Add 77154 Cpt? (Important Note: In 2017 The Use Of 29530 Is Being Tracked By Cms To Determine Future Global Period Reimbursement For Global Periods): no
Wound Diameter In Cm(Optional): 0.2
Follow Up Units (Optional): 0
Wound Crusting?: clean
Detail Level: Detailed
Lymphadenopathy?: absent
Body Location Override (Optional): Central Vertex Scalp
Percent Reepithelialization (Optional): 90
Wound Evaluated By (Optional): Haleigh Cho, MPhil, MD
Wound Evaluated By (Optional): Haleigh Cho, MPhil, MD
Body Location Override (Optional): Central Parietal Scalp

## 2019-12-04 ENCOUNTER — APPOINTMENT (OUTPATIENT)
Age: 84
Setting detail: DERMATOLOGY
End: 2019-12-16

## 2019-12-04 DIAGNOSIS — L57.0 ACTINIC KERATOSIS: ICD-10-CM

## 2019-12-04 DIAGNOSIS — Z85.828 PERSONAL HISTORY OF OTHER MALIGNANT NEOPLASM OF SKIN: ICD-10-CM

## 2019-12-04 DIAGNOSIS — Z71.89 OTHER SPECIFIED COUNSELING: ICD-10-CM

## 2019-12-04 PROCEDURE — OTHER LIQUID NITROGEN: OTHER

## 2019-12-04 PROCEDURE — 17003 DESTRUCT PREMALG LES 2-14: CPT

## 2019-12-04 PROCEDURE — OTHER MIPS QUALITY: OTHER

## 2019-12-04 PROCEDURE — 17000 DESTRUCT PREMALG LESION: CPT

## 2019-12-04 PROCEDURE — OTHER COUNSELING: OTHER

## 2019-12-04 PROCEDURE — 99213 OFFICE O/P EST LOW 20 MIN: CPT | Mod: 25

## 2019-12-04 ASSESSMENT — LOCATION ZONE DERM
LOCATION ZONE: SCALP
LOCATION ZONE: LIP
LOCATION ZONE: NECK

## 2019-12-04 ASSESSMENT — LOCATION DETAILED DESCRIPTION DERM
LOCATION DETAILED: RIGHT INFERIOR ANTERIOR NECK
LOCATION DETAILED: RIGHT MEDIAL FRONTAL SCALP
LOCATION DETAILED: RIGHT INFERIOR VERMILION LIP

## 2019-12-04 ASSESSMENT — LOCATION SIMPLE DESCRIPTION DERM
LOCATION SIMPLE: RIGHT SCALP
LOCATION SIMPLE: RIGHT ANTERIOR NECK
LOCATION SIMPLE: RIGHT LIP

## 2019-12-04 NOTE — PROCEDURE: MIPS QUALITY
Quality 226: Preventive Care And Screening: Tobacco Use: Screening And Cessation Intervention: Patient screened for tobacco and is an ex-smoker
Detail Level: Detailed
Quality 131: Pain Assessment And Follow-Up: Pain assessment documented as positive using a standardized tool AND a follow-up plan is documented
Quality 110: Preventive Care And Screening: Influenza Immunization: Influenza Immunization previously received during influenza season
Quality 431: Preventive Care And Screening: Unhealthy Alcohol Use - Screening: Patient screened for unhealthy alcohol use using a single question and scores less than 2 times per year
Quality 130: Documentation Of Current Medications In The Medical Record: Current Medications Documented
Quality 474: Zoster Vaccination Status: Shingrix Vaccination not Administered or Previously Received, Reason not Otherwise Specified

## 2019-12-04 NOTE — PROCEDURE: LIQUID NITROGEN
Consent: The patient's consent was obtained including but not limited to risks of crusting, scabbing, blistering, scarring, darker or lighter pigmentary change, recurrence, incomplete removal and infection.
Render Note In Bullet Format When Appropriate: No
Render Post-Care Instructions In Note?: yes
Duration Of Freeze Thaw-Cycle (Seconds): 10
Number Of Freeze-Thaw Cycles: 1 freeze-thaw cycle
Detail Level: Simple
Post-Care Instructions: I reviewed with the patient in detail post-care instructions. Patient is to wear sunprotection, and avoid picking at any of the treated lesions. Pt may apply Vaseline to crusted or scabbing areas.

## 2020-05-06 ENCOUNTER — APPOINTMENT (OUTPATIENT)
Age: 85
Setting detail: DERMATOLOGY
End: 2020-05-09

## 2020-05-06 DIAGNOSIS — L81.7 PIGMENTED PURPURIC DERMATOSIS: ICD-10-CM

## 2020-05-06 DIAGNOSIS — L82.1 OTHER SEBORRHEIC KERATOSIS: ICD-10-CM

## 2020-05-06 DIAGNOSIS — L57.0 ACTINIC KERATOSIS: ICD-10-CM

## 2020-05-06 DIAGNOSIS — Z85.828 PERSONAL HISTORY OF OTHER MALIGNANT NEOPLASM OF SKIN: ICD-10-CM

## 2020-05-06 DIAGNOSIS — D22 MELANOCYTIC NEVI: ICD-10-CM

## 2020-05-06 DIAGNOSIS — L85.3 XEROSIS CUTIS: ICD-10-CM

## 2020-05-06 DIAGNOSIS — L81.4 OTHER MELANIN HYPERPIGMENTATION: ICD-10-CM

## 2020-05-06 DIAGNOSIS — D18.0 HEMANGIOMA: ICD-10-CM

## 2020-05-06 PROBLEM — D18.01 HEMANGIOMA OF SKIN AND SUBCUTANEOUS TISSUE: Status: ACTIVE | Noted: 2020-05-06

## 2020-05-06 PROBLEM — D22.5 MELANOCYTIC NEVI OF TRUNK: Status: ACTIVE | Noted: 2020-05-06

## 2020-05-06 PROCEDURE — OTHER COUNSELING: OTHER

## 2020-05-06 PROCEDURE — OTHER PRESCRIPTION: OTHER

## 2020-05-06 PROCEDURE — 17003 DESTRUCT PREMALG LES 2-14: CPT

## 2020-05-06 PROCEDURE — OTHER LIQUID NITROGEN: OTHER

## 2020-05-06 PROCEDURE — 17000 DESTRUCT PREMALG LESION: CPT

## 2020-05-06 PROCEDURE — OTHER MIPS QUALITY: OTHER

## 2020-05-06 PROCEDURE — 99214 OFFICE O/P EST MOD 30 MIN: CPT | Mod: 25

## 2020-05-06 RX ORDER — HYDROCORTISONE 25 MG/G
APPLY THIN COAT CREAM TOPICAL BID, PRN
Qty: 30 | Refills: 3 | Status: ERX | COMMUNITY
Start: 2020-05-06

## 2020-05-06 ASSESSMENT — LOCATION SIMPLE DESCRIPTION DERM
LOCATION SIMPLE: RIGHT FOREHEAD
LOCATION SIMPLE: LEFT ZYGOMA
LOCATION SIMPLE: POSTERIOR SCALP
LOCATION SIMPLE: UPPER BACK
LOCATION SIMPLE: RIGHT CHEEK
LOCATION SIMPLE: LEFT CHEEK
LOCATION SIMPLE: RIGHT SCALP
LOCATION SIMPLE: RIGHT UPPER BACK
LOCATION SIMPLE: RIGHT ANKLE
LOCATION SIMPLE: SCALP
LOCATION SIMPLE: LEFT ANKLE
LOCATION SIMPLE: LEFT FOREHEAD

## 2020-05-06 ASSESSMENT — LOCATION DETAILED DESCRIPTION DERM
LOCATION DETAILED: LEFT CENTRAL PARIETAL SCALP
LOCATION DETAILED: RIGHT SUPERIOR PARIETAL SCALP
LOCATION DETAILED: RIGHT SUPERIOR MEDIAL UPPER BACK
LOCATION DETAILED: MID-OCCIPITAL SCALP
LOCATION DETAILED: RIGHT MEDIAL FRONTAL SCALP
LOCATION DETAILED: RIGHT INFERIOR MEDIAL FOREHEAD
LOCATION DETAILED: LEFT INFERIOR FOREHEAD
LOCATION DETAILED: LEFT CENTRAL ZYGOMA
LOCATION DETAILED: RIGHT MID PREAURICULAR CHEEK
LOCATION DETAILED: LEFT ANKLE
LOCATION DETAILED: RIGHT ANKLE
LOCATION DETAILED: SUPERIOR THORACIC SPINE
LOCATION DETAILED: LEFT SUPERIOR CENTRAL MALAR CHEEK

## 2020-05-06 ASSESSMENT — LOCATION ZONE DERM
LOCATION ZONE: TRUNK
LOCATION ZONE: SCALP
LOCATION ZONE: FACE
LOCATION ZONE: LEG

## 2020-05-06 NOTE — PROCEDURE: COUNSELING
Detail Level: Detailed
Detail Level: Simple
Detail Level: Generalized
Patient Specific Counseling (Will Not Stick From Patient To Patient): Apply 2.5% hydrocortisone cream BID, PRN itch

## 2020-05-06 NOTE — PROCEDURE: LIQUID NITROGEN
Duration Of Freeze Thaw-Cycle (Seconds): 10
Post-Care Instructions: I reviewed with the patient in detail post-care instructions. Patient is to wear sunprotection, and avoid picking at any of the treated lesions. Pt may apply Vaseline to crusted or scabbing areas.
Consent: The patient's consent was obtained including but not limited to risks of crusting, scabbing, blistering, scarring, darker or lighter pigmentary change, recurrence, incomplete removal and infection.
Render Note In Bullet Format When Appropriate: No
Detail Level: Detailed
Number Of Freeze-Thaw Cycles: 1 freeze-thaw cycle

## 2021-01-01 ENCOUNTER — APPOINTMENT (OUTPATIENT)
Dept: URBAN - METROPOLITAN AREA CLINIC 255 | Age: 86
Setting detail: DERMATOLOGY
End: 2021-01-01

## 2021-01-01 DIAGNOSIS — L82.1 OTHER SEBORRHEIC KERATOSIS: ICD-10-CM

## 2021-01-01 DIAGNOSIS — L57.0 ACTINIC KERATOSIS: ICD-10-CM

## 2021-01-01 DIAGNOSIS — L259 CONTACT DERMATITIS AND OTHER ECZEMA, UNSPECIFIED CAUSE: ICD-10-CM

## 2021-01-01 DIAGNOSIS — L81.4 OTHER MELANIN HYPERPIGMENTATION: ICD-10-CM

## 2021-01-01 DIAGNOSIS — D485 NEOPLASM OF UNCERTAIN BEHAVIOR OF SKIN: ICD-10-CM

## 2021-01-01 DIAGNOSIS — D18.0 HEMANGIOMA: ICD-10-CM

## 2021-01-01 DIAGNOSIS — D22 MELANOCYTIC NEVI: ICD-10-CM

## 2021-01-01 DIAGNOSIS — Z85.828 PERSONAL HISTORY OF OTHER MALIGNANT NEOPLASM OF SKIN: ICD-10-CM

## 2021-01-01 PROCEDURE — 17000 DESTRUCT PREMALG LESION: CPT | Mod: 59

## 2021-01-01 PROCEDURE — 17003 DESTRUCT PREMALG LES 2-14: CPT

## 2021-01-01 PROCEDURE — OTHER MIPS QUALITY: OTHER

## 2021-01-01 PROCEDURE — OTHER LIQUID NITROGEN: OTHER

## 2021-01-01 PROCEDURE — OTHER BIOPSY BY SHAVE METHOD: OTHER

## 2021-01-01 PROCEDURE — OTHER COUNSELING: OTHER

## 2021-01-01 PROCEDURE — 11102 TANGNTL BX SKIN SINGLE LES: CPT

## 2021-01-01 PROCEDURE — 17000 DESTRUCT PREMALG LESION: CPT

## 2021-01-01 PROCEDURE — 99213 OFFICE O/P EST LOW 20 MIN: CPT | Mod: 25

## 2021-01-01 PROCEDURE — 99214 OFFICE O/P EST MOD 30 MIN: CPT | Mod: 25

## 2021-01-01 PROCEDURE — OTHER PRESCRIPTION: OTHER

## 2021-01-01 RX ORDER — TRIAMCINOLONE ACETONIDE 1 MG/G
CREAM TOPICAL
Qty: 454 | Refills: 1 | Status: ERX | COMMUNITY
Start: 2021-01-01

## 2021-01-01 ASSESSMENT — LOCATION SIMPLE DESCRIPTION DERM
LOCATION SIMPLE: RIGHT UPPER BACK
LOCATION SIMPLE: LEFT FOREHEAD
LOCATION SIMPLE: LEFT TEMPLE
LOCATION SIMPLE: CHEST
LOCATION SIMPLE: SCALP
LOCATION SIMPLE: LEFT UPPER BACK
LOCATION SIMPLE: LEFT FOREHEAD
LOCATION SIMPLE: RIGHT FOREHEAD
LOCATION SIMPLE: RIGHT CHEEK
LOCATION SIMPLE: SCALP
LOCATION SIMPLE: LEFT UPPER BACK
LOCATION SIMPLE: RIGHT UPPER BACK
LOCATION SIMPLE: RIGHT OCCIPITAL SCALP
LOCATION SIMPLE: RIGHT CHEEK
LOCATION SIMPLE: POSTERIOR SCALP

## 2021-01-01 ASSESSMENT — BSA RASH: BSA RASH: 1

## 2021-01-01 ASSESSMENT — LOCATION DETAILED DESCRIPTION DERM
LOCATION DETAILED: RIGHT SUPERIOR OCCIPITAL SCALP
LOCATION DETAILED: LEFT LATERAL TEMPLE
LOCATION DETAILED: LEFT CENTRAL PARIETAL SCALP
LOCATION DETAILED: LEFT SUPERIOR MEDIAL UPPER BACK
LOCATION DETAILED: MID-OCCIPITAL SCALP
LOCATION DETAILED: RIGHT SUPERIOR MEDIAL FOREHEAD
LOCATION DETAILED: LEFT LATERAL SUPERIOR CHEST
LOCATION DETAILED: LEFT CENTRAL PARIETAL SCALP
LOCATION DETAILED: RIGHT MEDIAL UPPER BACK
LOCATION DETAILED: RIGHT INFERIOR LATERAL MALAR CHEEK
LOCATION DETAILED: RIGHT SUPERIOR PARIETAL SCALP
LOCATION DETAILED: RIGHT SUPERIOR LATERAL MALAR CHEEK
LOCATION DETAILED: RIGHT INFERIOR MEDIAL UPPER BACK
LOCATION DETAILED: RIGHT INFERIOR MEDIAL UPPER BACK
LOCATION DETAILED: RIGHT MEDIAL UPPER BACK
LOCATION DETAILED: LEFT SUPERIOR MEDIAL UPPER BACK
LOCATION DETAILED: LEFT SUPERIOR MEDIAL FOREHEAD
LOCATION DETAILED: RIGHT INFERIOR CENTRAL MALAR CHEEK
LOCATION DETAILED: LEFT SUPERIOR FOREHEAD

## 2021-01-01 ASSESSMENT — LOCATION ZONE DERM
LOCATION ZONE: TRUNK
LOCATION ZONE: SCALP
LOCATION ZONE: SCALP
LOCATION ZONE: FACE
LOCATION ZONE: FACE
LOCATION ZONE: TRUNK

## 2021-01-01 ASSESSMENT — SEVERITY ASSESSMENT 2020: SEVERITY 2020: MODERATE

## 2021-01-01 ASSESSMENT — PAIN INTENSITY VAS: HOW INTENSE IS YOUR PAIN 0 BEING NO PAIN, 10 BEING THE MOST SEVERE PAIN POSSIBLE?: NO PAIN

## 2021-02-01 RX ORDER — HYDROCORTISONE 25 MG/G
CREAM TOPICAL BID, PRN
Qty: 30 | Refills: 3 | Status: ERX

## 2021-05-10 PROBLEM — L81.4 OTHER MELANIN HYPERPIGMENTATION: Status: ACTIVE | Noted: 2021-01-01

## 2021-05-10 PROBLEM — D48.5 NEOPLASM OF UNCERTAIN BEHAVIOR OF SKIN: Status: ACTIVE | Noted: 2021-01-01

## 2021-05-10 PROBLEM — L82.1 OTHER SEBORRHEIC KERATOSIS: Status: ACTIVE | Noted: 2021-01-01

## 2021-05-10 PROBLEM — D18.01 HEMANGIOMA OF SKIN AND SUBCUTANEOUS TISSUE: Status: ACTIVE | Noted: 2021-01-01

## 2021-05-10 PROBLEM — D22.5 MELANOCYTIC NEVI OF TRUNK: Status: ACTIVE | Noted: 2021-01-01

## 2021-05-10 PROBLEM — L57.0 ACTINIC KERATOSIS: Status: ACTIVE | Noted: 2021-01-01

## 2021-05-10 NOTE — PROCEDURE: MIPS QUALITY
Quality 130: Documentation Of Current Medications In The Medical Record: Current Medications Documented
Detail Level: Detailed
Quality 226: Preventive Care And Screening: Tobacco Use: Screening And Cessation Intervention: Patient screened for tobacco use and is an ex/non-smoker
Quality 111:Pneumonia Vaccination Status For Older Adults: Pneumococcal Vaccination Previously Received
Quality 431: Preventive Care And Screening: Unhealthy Alcohol Use - Screening: Patient screened for unhealthy alcohol use using a single question and scores less than 2 times per year
Quality 265: Biopsy Follow-Up: Biopsy results reviewed, communicated, tracked, and documented
Quality 110: Preventive Care And Screening: Influenza Immunization: Influenza Immunization Administered during Influenza season

## 2021-05-10 NOTE — PROCEDURE: BIOPSY BY SHAVE METHOD
Billing Type: Third-Party Bill
Biopsy Method: double edge Personna blade
Anesthesia Type: 2% lidocaine with epinephrine and a 1:10 solution of 8.4% sodium bicarbonate
Render Path Notes In Note?: No
Electrodesiccation Text: The wound bed was treated with electrodesiccation after the biopsy was performed.
Information: Selecting Yes will display possible errors in your note based on the variables you have selected. This validation is only offered as a suggestion for you. PLEASE NOTE THAT THE VALIDATION TEXT WILL BE REMOVED WHEN YOU FINALIZE YOUR NOTE. IF YOU WANT TO FAX A PRELIMINARY NOTE YOU WILL NEED TO TOGGLE THIS TO 'NO' IF YOU DO NOT WANT IT IN YOUR FAXED NOTE.
Consent: Written consent was obtained and risks were reviewed including but not limited to scarring, infection, bleeding, scabbing, incomplete removal, nerve damage and allergy to anesthesia.
Dressing: bandage
Post-Care Instructions: I reviewed with the patient in detail post-care instructions. Patient is to keep the biopsy site dry overnight, and then apply bacitracin twice daily until healed. Patient may apply hydrogen peroxide soaks to remove any crusting.
Was A Bandage Applied: Yes
X Size Of Lesion In Cm: 1
Wound Care: Petrolatum
Electrodesiccation And Curettage Text: The wound bed was treated with electrodesiccation and curettage after the biopsy was performed.
Body Location Override (Optional - Billing Will Still Be Based On Selected Body Map Location If Applicable): Right Vertex
Depth Of Biopsy: dermis
Notification Instructions: Patient will be notified of biopsy results. However, patient instructed to call the office if not contacted within 2 weeks.
Silver Nitrate Text: The wound bed was treated with silver nitrate after the biopsy was performed.
Detail Level: Detailed
Size Of Lesion In Cm: 1.5
Curettage Text: The wound bed was treated with curettage after the biopsy was performed.
Path Notes (To The Dermatopathologist): Please check margins
Biopsy Type: H and E
Type Of Destruction Used: Curettage
Cryotherapy Text: The wound bed was treated with cryotherapy after the biopsy was performed.
Anesthesia Volume In Cc (Will Not Render If 0): 0.5
Additional Anesthesia Volume In Cc (Will Not Render If 0): 0
Hemostasis: Electrodesiccation

## 2021-10-25 PROBLEM — D18.01 HEMANGIOMA OF SKIN AND SUBCUTANEOUS TISSUE: Status: ACTIVE | Noted: 2021-01-01

## 2021-10-25 PROBLEM — L57.0 ACTINIC KERATOSIS: Status: ACTIVE | Noted: 2021-01-01

## 2021-10-25 PROBLEM — Z85.828 PERSONAL HISTORY OF OTHER MALIGNANT NEOPLASM OF SKIN: Status: ACTIVE | Noted: 2021-01-01

## 2021-10-25 PROBLEM — L81.4 OTHER MELANIN HYPERPIGMENTATION: Status: ACTIVE | Noted: 2021-01-01

## 2021-10-25 PROBLEM — L82.1 OTHER SEBORRHEIC KERATOSIS: Status: ACTIVE | Noted: 2021-01-01

## 2021-10-25 PROBLEM — D22.5 MELANOCYTIC NEVI OF TRUNK: Status: ACTIVE | Noted: 2021-01-01

## 2021-10-25 PROBLEM — L30.8 OTHER SPECIFIED DERMATITIS: Status: ACTIVE | Noted: 2021-01-01

## 2021-10-25 NOTE — PROCEDURE: LIQUID NITROGEN
Duration Of Freeze Thaw-Cycle (Seconds): 10
Show Aperture Variable?: Yes
Render Note In Bullet Format When Appropriate: No
Detail Level: Detailed
Consent: The patient's consent was obtained including but not limited to risks of crusting, scabbing, blistering, scarring, darker or lighter pigmentary change, recurrence, incomplete removal and infection.
Number Of Freeze-Thaw Cycles: 1 freeze-thaw cycle
Post-Care Instructions: I reviewed with the patient in detail post-care instructions. Patient is to wear sunprotection, and avoid picking at any of the treated lesions. Pt may apply Vaseline to crusted or scabbing areas.

## 2021-10-25 NOTE — PROCEDURE: COUNSELING
Detail Level: Generalized
Detail Level: Detailed
Patient Specific Counseling (Will Not Stick From Patient To Patient): Noted there is some lichenification due to chronic rubbing.  Use of moisturizer and HC alone has been insufficient to clear this eruption.  Recommended topical TMC 0.1% cream BID until resolved, then prn.

## 2022-01-01 ENCOUNTER — APPOINTMENT (OUTPATIENT)
Dept: ULTRASOUND IMAGING | Facility: CLINIC | Age: 87
DRG: 280 | End: 2022-01-01
Attending: HOSPITALIST
Payer: COMMERCIAL

## 2022-01-01 ENCOUNTER — HOSPITAL ENCOUNTER (INPATIENT)
Facility: CLINIC | Age: 87
LOS: 5 days | Discharge: NURSING, REHAB OR LONG-TERM CARE | DRG: 280 | End: 2022-03-01
Attending: EMERGENCY MEDICINE | Admitting: HOSPITALIST
Payer: COMMERCIAL

## 2022-01-01 ENCOUNTER — APPOINTMENT (OUTPATIENT)
Dept: GENERAL RADIOLOGY | Facility: CLINIC | Age: 87
DRG: 291 | End: 2022-01-01
Attending: HOSPITALIST
Payer: COMMERCIAL

## 2022-01-01 ENCOUNTER — APPOINTMENT (OUTPATIENT)
Dept: CT IMAGING | Facility: CLINIC | Age: 87
DRG: 291 | End: 2022-01-01
Attending: NURSE PRACTITIONER
Payer: COMMERCIAL

## 2022-01-01 ENCOUNTER — LAB REQUISITION (OUTPATIENT)
Dept: LAB | Facility: CLINIC | Age: 87
End: 2022-01-01
Payer: COMMERCIAL

## 2022-01-01 ENCOUNTER — HOSPITAL ENCOUNTER (INPATIENT)
Facility: CLINIC | Age: 87
LOS: 10 days | Discharge: NURSING, REHAB OR LONG-TERM CARE | DRG: 291 | End: 2022-03-14
Attending: EMERGENCY MEDICINE | Admitting: INTERNAL MEDICINE
Payer: COMMERCIAL

## 2022-01-01 ENCOUNTER — APPOINTMENT (OUTPATIENT)
Dept: CARDIOLOGY | Facility: CLINIC | Age: 87
DRG: 280 | End: 2022-01-01
Attending: HOSPITALIST
Payer: COMMERCIAL

## 2022-01-01 ENCOUNTER — APPOINTMENT (OUTPATIENT)
Dept: CT IMAGING | Facility: CLINIC | Age: 87
DRG: 280 | End: 2022-01-01
Attending: NURSE PRACTITIONER
Payer: COMMERCIAL

## 2022-01-01 ENCOUNTER — PATIENT OUTREACH (OUTPATIENT)
Dept: CARE COORDINATION | Facility: CLINIC | Age: 87
End: 2022-01-01
Payer: COMMERCIAL

## 2022-01-01 ENCOUNTER — APPOINTMENT (OUTPATIENT)
Dept: GENERAL RADIOLOGY | Facility: CLINIC | Age: 87
DRG: 280 | End: 2022-01-01
Attending: HOSPITALIST
Payer: COMMERCIAL

## 2022-01-01 ENCOUNTER — APPOINTMENT (OUTPATIENT)
Dept: PHYSICAL THERAPY | Facility: CLINIC | Age: 87
DRG: 280 | End: 2022-01-01
Payer: COMMERCIAL

## 2022-01-01 ENCOUNTER — TRANSFERRED RECORDS (OUTPATIENT)
Dept: HEALTH INFORMATION MANAGEMENT | Facility: CLINIC | Age: 87
End: 2022-01-01

## 2022-01-01 ENCOUNTER — APPOINTMENT (OUTPATIENT)
Dept: OCCUPATIONAL THERAPY | Facility: CLINIC | Age: 87
DRG: 291 | End: 2022-01-01
Payer: COMMERCIAL

## 2022-01-01 ENCOUNTER — DOCUMENTATION ONLY (OUTPATIENT)
Dept: OTHER | Facility: CLINIC | Age: 87
End: 2022-01-01
Payer: COMMERCIAL

## 2022-01-01 ENCOUNTER — APPOINTMENT (OUTPATIENT)
Dept: OCCUPATIONAL THERAPY | Facility: CLINIC | Age: 87
DRG: 291 | End: 2022-01-01
Attending: INTERNAL MEDICINE
Payer: COMMERCIAL

## 2022-01-01 ENCOUNTER — APPOINTMENT (OUTPATIENT)
Dept: GENERAL RADIOLOGY | Facility: CLINIC | Age: 87
DRG: 280 | End: 2022-01-01
Attending: NURSE PRACTITIONER
Payer: COMMERCIAL

## 2022-01-01 ENCOUNTER — APPOINTMENT (OUTPATIENT)
Dept: GENERAL RADIOLOGY | Facility: CLINIC | Age: 87
DRG: 280 | End: 2022-01-01
Attending: EMERGENCY MEDICINE
Payer: COMMERCIAL

## 2022-01-01 ENCOUNTER — APPOINTMENT (OUTPATIENT)
Dept: NUCLEAR MEDICINE | Facility: CLINIC | Age: 87
DRG: 280 | End: 2022-01-01
Attending: INTERNAL MEDICINE
Payer: COMMERCIAL

## 2022-01-01 ENCOUNTER — APPOINTMENT (OUTPATIENT)
Dept: OCCUPATIONAL THERAPY | Facility: CLINIC | Age: 87
DRG: 280 | End: 2022-01-01
Attending: HOSPITALIST
Payer: COMMERCIAL

## 2022-01-01 ENCOUNTER — APPOINTMENT (OUTPATIENT)
Dept: PHYSICAL THERAPY | Facility: CLINIC | Age: 87
DRG: 291 | End: 2022-01-01
Attending: HOSPITALIST
Payer: COMMERCIAL

## 2022-01-01 ENCOUNTER — APPOINTMENT (OUTPATIENT)
Dept: PHYSICAL THERAPY | Facility: CLINIC | Age: 87
DRG: 280 | End: 2022-01-01
Attending: HOSPITALIST
Payer: COMMERCIAL

## 2022-01-01 VITALS
SYSTOLIC BLOOD PRESSURE: 111 MMHG | WEIGHT: 131.2 LBS | RESPIRATION RATE: 18 BRPM | TEMPERATURE: 98.4 F | BODY MASS INDEX: 21.86 KG/M2 | HEIGHT: 65 IN | HEART RATE: 61 BPM | DIASTOLIC BLOOD PRESSURE: 48 MMHG | OXYGEN SATURATION: 95 %

## 2022-01-01 VITALS
TEMPERATURE: 97.4 F | DIASTOLIC BLOOD PRESSURE: 51 MMHG | HEIGHT: 65 IN | BODY MASS INDEX: 23.14 KG/M2 | OXYGEN SATURATION: 94 % | HEART RATE: 62 BPM | SYSTOLIC BLOOD PRESSURE: 99 MMHG | RESPIRATION RATE: 18 BRPM | WEIGHT: 138.9 LBS

## 2022-01-01 DIAGNOSIS — I10 ESSENTIAL (PRIMARY) HYPERTENSION: ICD-10-CM

## 2022-01-01 DIAGNOSIS — I50.9 HEART FAILURE, UNSPECIFIED (H): ICD-10-CM

## 2022-01-01 DIAGNOSIS — I50.21 ACUTE SYSTOLIC CONGESTIVE HEART FAILURE (H): ICD-10-CM

## 2022-01-01 DIAGNOSIS — I50.23 ACUTE ON CHRONIC SYSTOLIC CONGESTIVE HEART FAILURE (H): Primary | ICD-10-CM

## 2022-01-01 DIAGNOSIS — D64.9 ANEMIA, UNSPECIFIED TYPE: ICD-10-CM

## 2022-01-01 DIAGNOSIS — R31.9 HEMATURIA, UNSPECIFIED: ICD-10-CM

## 2022-01-01 DIAGNOSIS — Z71.89 OTHER SPECIFIED COUNSELING: ICD-10-CM

## 2022-01-01 DIAGNOSIS — D72.829 ELEVATED WHITE BLOOD CELL COUNT, UNSPECIFIED: ICD-10-CM

## 2022-01-01 DIAGNOSIS — N18.9 CHRONIC KIDNEY DISEASE, UNSPECIFIED: ICD-10-CM

## 2022-01-01 DIAGNOSIS — D64.9 ANEMIA, UNSPECIFIED: ICD-10-CM

## 2022-01-01 DIAGNOSIS — R41.0 DELIRIUM: ICD-10-CM

## 2022-01-01 DIAGNOSIS — J96.01 ACUTE RESPIRATORY FAILURE WITH HYPOXIA (H): ICD-10-CM

## 2022-01-01 LAB
ABO/RH(D): NORMAL
ABO/RH(D): NORMAL
ALBUMIN SERPL-MCNC: 2.5 G/DL (ref 3.4–5)
ALBUMIN SERPL-MCNC: 2.8 G/DL (ref 3.4–5)
ALBUMIN SERPL-MCNC: 3 G/DL (ref 3.4–5)
ALBUMIN UR-MCNC: 10 MG/DL
ALBUMIN UR-MCNC: NEGATIVE MG/DL
ALP SERPL-CCNC: 63 U/L (ref 40–150)
ALP SERPL-CCNC: 74 U/L (ref 40–150)
ALP SERPL-CCNC: 79 U/L (ref 40–150)
ALT SERPL W P-5'-P-CCNC: 15 U/L (ref 0–70)
ALT SERPL W P-5'-P-CCNC: 17 U/L (ref 0–70)
ALT SERPL W P-5'-P-CCNC: 21 U/L (ref 0–70)
AMMONIA PLAS-SCNC: <10 UMOL/L (ref 10–50)
ANION GAP SERPL CALCULATED.3IONS-SCNC: 13 MMOL/L (ref 5–18)
ANION GAP SERPL CALCULATED.3IONS-SCNC: 15 MMOL/L (ref 5–18)
ANION GAP SERPL CALCULATED.3IONS-SCNC: 16 MMOL/L (ref 5–18)
ANION GAP SERPL CALCULATED.3IONS-SCNC: 4 MMOL/L (ref 3–14)
ANION GAP SERPL CALCULATED.3IONS-SCNC: 4 MMOL/L (ref 3–14)
ANION GAP SERPL CALCULATED.3IONS-SCNC: 5 MMOL/L (ref 3–14)
ANION GAP SERPL CALCULATED.3IONS-SCNC: 6 MMOL/L (ref 3–14)
ANION GAP SERPL CALCULATED.3IONS-SCNC: 7 MMOL/L (ref 3–14)
ANION GAP SERPL CALCULATED.3IONS-SCNC: 8 MMOL/L (ref 3–14)
ANION GAP SERPL CALCULATED.3IONS-SCNC: 9 MMOL/L (ref 3–14)
ANTIBODY SCREEN: NEGATIVE
ANTIBODY SCREEN: NEGATIVE
APPEARANCE UR: ABNORMAL
APPEARANCE UR: CLEAR
AST SERPL W P-5'-P-CCNC: 17 U/L (ref 0–45)
AST SERPL W P-5'-P-CCNC: 18 U/L (ref 0–45)
AST SERPL W P-5'-P-CCNC: 18 U/L (ref 0–45)
ATRIAL RATE - MUSE: 67 BPM
ATRIAL RATE - MUSE: 70 BPM
ATRIAL RATE - MUSE: 80 BPM
BACTERIA #/AREA URNS HPF: ABNORMAL /HPF
BACTERIA UR CULT: ABNORMAL
BACTERIA UR CULT: ABNORMAL
BASE EXCESS BLDA CALC-SCNC: 6.6 MMOL/L (ref -9–1.8)
BASE EXCESS BLDV CALC-SCNC: -5 MMOL/L (ref -7.7–1.9)
BASE EXCESS BLDV CALC-SCNC: 4.7 MMOL/L (ref -7.7–1.9)
BASE EXCESS BLDV CALC-SCNC: 6.4 MMOL/L (ref -7.7–1.9)
BASOPHILS # BLD AUTO: 0 10E3/UL (ref 0–0.2)
BASOPHILS # BLD AUTO: 0 10E3/UL (ref 0–0.2)
BASOPHILS NFR BLD AUTO: 0 %
BASOPHILS NFR BLD AUTO: 0 %
BILIRUB DIRECT SERPL-MCNC: <0.1 MG/DL (ref 0–0.2)
BILIRUB SERPL-MCNC: 0.2 MG/DL (ref 0.2–1.3)
BILIRUB SERPL-MCNC: 0.3 MG/DL (ref 0.2–1.3)
BILIRUB SERPL-MCNC: 0.3 MG/DL (ref 0.2–1.3)
BILIRUB UR QL STRIP: NEGATIVE
BILIRUB UR QL STRIP: NEGATIVE
BLD PROD TYP BPU: NORMAL
BLOOD COMPONENT TYPE: NORMAL
BUN SERPL-MCNC: 36 MG/DL (ref 7–30)
BUN SERPL-MCNC: 40 MG/DL (ref 7–30)
BUN SERPL-MCNC: 41 MG/DL (ref 7–30)
BUN SERPL-MCNC: 42 MG/DL (ref 7–30)
BUN SERPL-MCNC: 43 MG/DL (ref 7–30)
BUN SERPL-MCNC: 45 MG/DL (ref 7–30)
BUN SERPL-MCNC: 45 MG/DL (ref 7–30)
BUN SERPL-MCNC: 46 MG/DL (ref 8–28)
BUN SERPL-MCNC: 47 MG/DL (ref 7–30)
BUN SERPL-MCNC: 48 MG/DL (ref 8–28)
BUN SERPL-MCNC: 49 MG/DL (ref 7–30)
BUN SERPL-MCNC: 49 MG/DL (ref 7–30)
BUN SERPL-MCNC: 50 MG/DL (ref 7–30)
BUN SERPL-MCNC: 52 MG/DL (ref 7–30)
BUN SERPL-MCNC: 52 MG/DL (ref 7–30)
BUN SERPL-MCNC: 53 MG/DL (ref 7–30)
BUN SERPL-MCNC: 54 MG/DL (ref 8–28)
CALCIUM SERPL-MCNC: 8 MG/DL (ref 8.5–10.1)
CALCIUM SERPL-MCNC: 8 MG/DL (ref 8.5–10.1)
CALCIUM SERPL-MCNC: 8.4 MG/DL (ref 8.5–10.1)
CALCIUM SERPL-MCNC: 8.4 MG/DL (ref 8.5–10.5)
CALCIUM SERPL-MCNC: 8.5 MG/DL (ref 8.5–10.1)
CALCIUM SERPL-MCNC: 8.5 MG/DL (ref 8.5–10.1)
CALCIUM SERPL-MCNC: 8.6 MG/DL (ref 8.5–10.1)
CALCIUM SERPL-MCNC: 8.7 MG/DL (ref 8.5–10.1)
CALCIUM SERPL-MCNC: 8.7 MG/DL (ref 8.5–10.5)
CALCIUM SERPL-MCNC: 8.8 MG/DL (ref 8.5–10.1)
CALCIUM SERPL-MCNC: 8.9 MG/DL (ref 8.5–10.1)
CALCIUM SERPL-MCNC: 8.9 MG/DL (ref 8.5–10.1)
CALCIUM SERPL-MCNC: 9 MG/DL (ref 8.5–10.1)
CALCIUM SERPL-MCNC: 9.2 MG/DL (ref 8.5–10.5)
CALCIUM SERPL-MCNC: 9.3 MG/DL (ref 8.5–10.1)
CHLORIDE BLD-SCNC: 100 MMOL/L (ref 94–109)
CHLORIDE BLD-SCNC: 100 MMOL/L (ref 94–109)
CHLORIDE BLD-SCNC: 100 MMOL/L (ref 98–107)
CHLORIDE BLD-SCNC: 101 MMOL/L (ref 94–109)
CHLORIDE BLD-SCNC: 102 MMOL/L (ref 98–107)
CHLORIDE BLD-SCNC: 102 MMOL/L (ref 98–107)
CHLORIDE BLD-SCNC: 103 MMOL/L (ref 94–109)
CHLORIDE BLD-SCNC: 104 MMOL/L (ref 94–109)
CHLORIDE BLD-SCNC: 105 MMOL/L (ref 94–109)
CHLORIDE BLD-SCNC: 108 MMOL/L (ref 94–109)
CHLORIDE BLD-SCNC: 113 MMOL/L (ref 94–109)
CHLORIDE BLD-SCNC: 114 MMOL/L (ref 94–109)
CHLORIDE BLD-SCNC: 96 MMOL/L (ref 94–109)
CHLORIDE BLD-SCNC: 97 MMOL/L (ref 94–109)
CHLORIDE BLD-SCNC: 98 MMOL/L (ref 94–109)
CHLORIDE BLD-SCNC: 99 MMOL/L (ref 94–109)
CO2 SERPL-SCNC: 19 MMOL/L (ref 20–32)
CO2 SERPL-SCNC: 20 MMOL/L (ref 22–31)
CO2 SERPL-SCNC: 21 MMOL/L (ref 20–32)
CO2 SERPL-SCNC: 21 MMOL/L (ref 20–32)
CO2 SERPL-SCNC: 21 MMOL/L (ref 22–31)
CO2 SERPL-SCNC: 23 MMOL/L (ref 20–32)
CO2 SERPL-SCNC: 23 MMOL/L (ref 22–31)
CO2 SERPL-SCNC: 24 MMOL/L (ref 20–32)
CO2 SERPL-SCNC: 24 MMOL/L (ref 20–32)
CO2 SERPL-SCNC: 25 MMOL/L (ref 20–32)
CO2 SERPL-SCNC: 27 MMOL/L (ref 20–32)
CO2 SERPL-SCNC: 28 MMOL/L (ref 20–32)
CO2 SERPL-SCNC: 28 MMOL/L (ref 20–32)
CO2 SERPL-SCNC: 29 MMOL/L (ref 20–32)
CO2 SERPL-SCNC: 29 MMOL/L (ref 20–32)
CO2 SERPL-SCNC: 30 MMOL/L (ref 20–32)
CO2 SERPL-SCNC: 30 MMOL/L (ref 20–32)
CODING SYSTEM: NORMAL
COLOR UR AUTO: ABNORMAL
COLOR UR AUTO: ABNORMAL
CREAT SERPL-MCNC: 1.9 MG/DL (ref 0.66–1.25)
CREAT SERPL-MCNC: 1.98 MG/DL (ref 0.66–1.25)
CREAT SERPL-MCNC: 2.06 MG/DL (ref 0.66–1.25)
CREAT SERPL-MCNC: 2.07 MG/DL (ref 0.7–1.3)
CREAT SERPL-MCNC: 2.09 MG/DL (ref 0.7–1.3)
CREAT SERPL-MCNC: 2.12 MG/DL (ref 0.66–1.25)
CREAT SERPL-MCNC: 2.13 MG/DL (ref 0.66–1.25)
CREAT SERPL-MCNC: 2.15 MG/DL (ref 0.66–1.25)
CREAT SERPL-MCNC: 2.22 MG/DL (ref 0.66–1.25)
CREAT SERPL-MCNC: 2.29 MG/DL (ref 0.66–1.25)
CREAT SERPL-MCNC: 2.3 MG/DL (ref 0.66–1.25)
CREAT SERPL-MCNC: 2.33 MG/DL (ref 0.66–1.25)
CREAT SERPL-MCNC: 2.37 MG/DL (ref 0.66–1.25)
CREAT SERPL-MCNC: 2.4 MG/DL (ref 0.66–1.25)
CREAT SERPL-MCNC: 2.41 MG/DL (ref 0.66–1.25)
CREAT SERPL-MCNC: 2.49 MG/DL (ref 0.66–1.25)
CREAT SERPL-MCNC: 2.54 MG/DL (ref 0.66–1.25)
CREAT SERPL-MCNC: 2.58 MG/DL (ref 0.66–1.25)
CREAT SERPL-MCNC: 2.6 MG/DL (ref 0.7–1.3)
CROSSMATCH: NORMAL
D DIMER PPP FEU-MCNC: 2.9 UG/ML FEU (ref 0–0.5)
DIASTOLIC BLOOD PRESSURE - MUSE: NORMAL MMHG
EOSINOPHIL # BLD AUTO: 0 10E3/UL (ref 0–0.7)
EOSINOPHIL # BLD AUTO: 0.1 10E3/UL (ref 0–0.7)
EOSINOPHIL NFR BLD AUTO: 0 %
EOSINOPHIL NFR BLD AUTO: 1 %
ERYTHROCYTE [DISTWIDTH] IN BLOOD BY AUTOMATED COUNT: 15.4 % (ref 10–15)
ERYTHROCYTE [DISTWIDTH] IN BLOOD BY AUTOMATED COUNT: 15.5 % (ref 10–15)
ERYTHROCYTE [DISTWIDTH] IN BLOOD BY AUTOMATED COUNT: 15.6 % (ref 10–15)
ERYTHROCYTE [DISTWIDTH] IN BLOOD BY AUTOMATED COUNT: 15.8 % (ref 10–15)
ERYTHROCYTE [DISTWIDTH] IN BLOOD BY AUTOMATED COUNT: 15.9 % (ref 10–15)
ERYTHROCYTE [DISTWIDTH] IN BLOOD BY AUTOMATED COUNT: 16.2 % (ref 10–15)
ERYTHROCYTE [DISTWIDTH] IN BLOOD BY AUTOMATED COUNT: 16.3 % (ref 10–15)
ERYTHROCYTE [DISTWIDTH] IN BLOOD BY AUTOMATED COUNT: 16.5 % (ref 10–15)
ERYTHROCYTE [DISTWIDTH] IN BLOOD BY AUTOMATED COUNT: 17.1 % (ref 10–15)
ERYTHROCYTE [DISTWIDTH] IN BLOOD BY AUTOMATED COUNT: 17.2 % (ref 10–15)
ERYTHROCYTE [DISTWIDTH] IN BLOOD BY AUTOMATED COUNT: 17.6 % (ref 10–15)
ERYTHROCYTE [DISTWIDTH] IN BLOOD BY AUTOMATED COUNT: 18 % (ref 10–15)
FLUAV RNA SPEC QL NAA+PROBE: NEGATIVE
FLUBV RNA RESP QL NAA+PROBE: NEGATIVE
FOLATE SERPL-MCNC: 10.8 NG/ML
FOLATE SERPL-MCNC: 5.6 NG/ML
GFR SERPL CREATININE-BSD FRML MDRD: 23 ML/MIN/1.73M2
GFR SERPL CREATININE-BSD FRML MDRD: 23 ML/MIN/1.73M2
GFR SERPL CREATININE-BSD FRML MDRD: 24 ML/MIN/1.73M2
GFR SERPL CREATININE-BSD FRML MDRD: 24 ML/MIN/1.73M2
GFR SERPL CREATININE-BSD FRML MDRD: 25 ML/MIN/1.73M2
GFR SERPL CREATININE-BSD FRML MDRD: 25 ML/MIN/1.73M2
GFR SERPL CREATININE-BSD FRML MDRD: 26 ML/MIN/1.73M2
GFR SERPL CREATININE-BSD FRML MDRD: 27 ML/MIN/1.73M2
GFR SERPL CREATININE-BSD FRML MDRD: 28 ML/MIN/1.73M2
GFR SERPL CREATININE-BSD FRML MDRD: 29 ML/MIN/1.73M2
GFR SERPL CREATININE-BSD FRML MDRD: 30 ML/MIN/1.73M2
GFR SERPL CREATININE-BSD FRML MDRD: 32 ML/MIN/1.73M2
GFR SERPL CREATININE-BSD FRML MDRD: 33 ML/MIN/1.73M2
GLUCOSE BLD-MCNC: 101 MG/DL (ref 70–99)
GLUCOSE BLD-MCNC: 105 MG/DL (ref 70–99)
GLUCOSE BLD-MCNC: 106 MG/DL (ref 70–99)
GLUCOSE BLD-MCNC: 110 MG/DL (ref 70–99)
GLUCOSE BLD-MCNC: 112 MG/DL (ref 70–99)
GLUCOSE BLD-MCNC: 113 MG/DL (ref 70–99)
GLUCOSE BLD-MCNC: 114 MG/DL (ref 70–99)
GLUCOSE BLD-MCNC: 118 MG/DL (ref 70–99)
GLUCOSE BLD-MCNC: 125 MG/DL (ref 70–99)
GLUCOSE BLD-MCNC: 125 MG/DL (ref 70–99)
GLUCOSE BLD-MCNC: 127 MG/DL (ref 70–99)
GLUCOSE BLD-MCNC: 130 MG/DL (ref 70–99)
GLUCOSE BLD-MCNC: 133 MG/DL (ref 70–99)
GLUCOSE BLD-MCNC: 146 MG/DL (ref 70–99)
GLUCOSE BLD-MCNC: 191 MG/DL (ref 70–125)
GLUCOSE BLD-MCNC: 90 MG/DL (ref 70–125)
GLUCOSE BLD-MCNC: 93 MG/DL (ref 70–125)
GLUCOSE BLD-MCNC: 94 MG/DL (ref 70–99)
GLUCOSE BLD-MCNC: 97 MG/DL (ref 70–99)
GLUCOSE BLDC GLUCOMTR-MCNC: 122 MG/DL (ref 70–99)
GLUCOSE BLDC GLUCOMTR-MCNC: 138 MG/DL (ref 70–99)
GLUCOSE UR STRIP-MCNC: NEGATIVE MG/DL
GLUCOSE UR STRIP-MCNC: NEGATIVE MG/DL
HCO3 BLD-SCNC: 30 MMOL/L (ref 21–28)
HCO3 BLDV-SCNC: 20 MMOL/L (ref 21–28)
HCO3 BLDV-SCNC: 29 MMOL/L (ref 21–28)
HCO3 BLDV-SCNC: 31 MMOL/L (ref 21–28)
HCT VFR BLD AUTO: 21.1 % (ref 40–53)
HCT VFR BLD AUTO: 22 % (ref 40–53)
HCT VFR BLD AUTO: 22.5 % (ref 40–53)
HCT VFR BLD AUTO: 22.5 % (ref 40–53)
HCT VFR BLD AUTO: 23 % (ref 40–53)
HCT VFR BLD AUTO: 23.1 % (ref 40–53)
HCT VFR BLD AUTO: 23.1 % (ref 40–53)
HCT VFR BLD AUTO: 23.4 % (ref 40–53)
HCT VFR BLD AUTO: 24.1 % (ref 40–53)
HCT VFR BLD AUTO: 25.5 % (ref 40–53)
HCT VFR BLD AUTO: 25.8 % (ref 40–53)
HCT VFR BLD AUTO: 26 % (ref 40–53)
HCT VFR BLD AUTO: 26.3 % (ref 40–53)
HCT VFR BLD AUTO: 26.5 % (ref 40–53)
HCT VFR BLD AUTO: 27.3 % (ref 40–53)
HCT VFR BLD AUTO: 28.4 % (ref 40–53)
HGB BLD-MCNC: 6.5 G/DL (ref 13.3–17.7)
HGB BLD-MCNC: 6.5 G/DL (ref 13.3–17.7)
HGB BLD-MCNC: 6.8 G/DL (ref 13.3–17.7)
HGB BLD-MCNC: 7 G/DL (ref 13.3–17.7)
HGB BLD-MCNC: 7.1 G/DL (ref 13.3–17.7)
HGB BLD-MCNC: 7.3 G/DL (ref 13.3–17.7)
HGB BLD-MCNC: 7.6 G/DL (ref 13.3–17.7)
HGB BLD-MCNC: 7.8 G/DL (ref 13.3–17.7)
HGB BLD-MCNC: 8 G/DL (ref 13.3–17.7)
HGB BLD-MCNC: 8.2 G/DL (ref 13.3–17.7)
HGB BLD-MCNC: 8.2 G/DL (ref 13.3–17.7)
HGB BLD-MCNC: 8.4 G/DL (ref 13.3–17.7)
HGB BLD-MCNC: 8.6 G/DL (ref 13.3–17.7)
HGB BLD-MCNC: 9.1 G/DL (ref 13.3–17.7)
HGB UR QL STRIP: ABNORMAL
HGB UR QL STRIP: ABNORMAL
HOLD SPECIMEN: NORMAL
HYALINE CASTS: 1 /LPF
IMM GRANULOCYTES # BLD: 0.1 10E3/UL
IMM GRANULOCYTES # BLD: 0.1 10E3/UL
IMM GRANULOCYTES NFR BLD: 1 %
IMM GRANULOCYTES NFR BLD: 1 %
INTERPRETATION ECG - MUSE: NORMAL
IRON SATN MFR SERPL: 24 % (ref 15–46)
IRON SERPL-MCNC: 62 UG/DL (ref 35–180)
ISSUE DATE AND TIME: NORMAL
KETONES UR STRIP-MCNC: NEGATIVE MG/DL
KETONES UR STRIP-MCNC: NEGATIVE MG/DL
LACTATE SERPL-SCNC: 1.4 MMOL/L (ref 0.7–2)
LEUKOCYTE ESTERASE UR QL STRIP: ABNORMAL
LEUKOCYTE ESTERASE UR QL STRIP: NEGATIVE
LVEF ECHO: NORMAL
LYMPHOCYTES # BLD AUTO: 0.7 10E3/UL (ref 0.8–5.3)
LYMPHOCYTES # BLD AUTO: 1 10E3/UL (ref 0.8–5.3)
LYMPHOCYTES NFR BLD AUTO: 11 %
LYMPHOCYTES NFR BLD AUTO: 8 %
MAGNESIUM SERPL-MCNC: 2.1 MG/DL (ref 1.6–2.3)
MAGNESIUM SERPL-MCNC: 2.2 MG/DL (ref 1.6–2.3)
MCH RBC QN AUTO: 31.7 PG (ref 26.5–33)
MCH RBC QN AUTO: 31.7 PG (ref 26.5–33)
MCH RBC QN AUTO: 31.8 PG (ref 26.5–33)
MCH RBC QN AUTO: 31.9 PG (ref 26.5–33)
MCH RBC QN AUTO: 32 PG (ref 26.5–33)
MCH RBC QN AUTO: 32 PG (ref 26.5–33)
MCH RBC QN AUTO: 32.2 PG (ref 26.5–33)
MCH RBC QN AUTO: 32.6 PG (ref 26.5–33)
MCH RBC QN AUTO: 32.7 PG (ref 26.5–33)
MCH RBC QN AUTO: 32.9 PG (ref 26.5–33)
MCH RBC QN AUTO: 33.1 PG (ref 26.5–33)
MCH RBC QN AUTO: 33.2 PG (ref 26.5–33)
MCH RBC QN AUTO: 33.5 PG (ref 26.5–33)
MCH RBC QN AUTO: 33.6 PG (ref 26.5–33)
MCHC RBC AUTO-ENTMCNC: 30.3 G/DL (ref 31.5–36.5)
MCHC RBC AUTO-ENTMCNC: 30.6 G/DL (ref 31.5–36.5)
MCHC RBC AUTO-ENTMCNC: 30.7 G/DL (ref 31.5–36.5)
MCHC RBC AUTO-ENTMCNC: 30.8 G/DL (ref 31.5–36.5)
MCHC RBC AUTO-ENTMCNC: 30.8 G/DL (ref 31.5–36.5)
MCHC RBC AUTO-ENTMCNC: 30.9 G/DL (ref 31.5–36.5)
MCHC RBC AUTO-ENTMCNC: 31.2 G/DL (ref 31.5–36.5)
MCHC RBC AUTO-ENTMCNC: 31.2 G/DL (ref 31.5–36.5)
MCHC RBC AUTO-ENTMCNC: 31.5 G/DL (ref 31.5–36.5)
MCHC RBC AUTO-ENTMCNC: 31.5 G/DL (ref 31.5–36.5)
MCHC RBC AUTO-ENTMCNC: 31.6 G/DL (ref 31.5–36.5)
MCHC RBC AUTO-ENTMCNC: 31.6 G/DL (ref 31.5–36.5)
MCHC RBC AUTO-ENTMCNC: 31.7 G/DL (ref 31.5–36.5)
MCHC RBC AUTO-ENTMCNC: 31.7 G/DL (ref 31.5–36.5)
MCHC RBC AUTO-ENTMCNC: 31.8 G/DL (ref 31.5–36.5)
MCHC RBC AUTO-ENTMCNC: 32 G/DL (ref 31.5–36.5)
MCV RBC AUTO: 100 FL (ref 78–100)
MCV RBC AUTO: 101 FL (ref 78–100)
MCV RBC AUTO: 101 FL (ref 78–100)
MCV RBC AUTO: 102 FL (ref 78–100)
MCV RBC AUTO: 104 FL (ref 78–100)
MCV RBC AUTO: 105 FL (ref 78–100)
MCV RBC AUTO: 106 FL (ref 78–100)
MCV RBC AUTO: 107 FL (ref 78–100)
MCV RBC AUTO: 108 FL (ref 78–100)
MCV RBC AUTO: 109 FL (ref 78–100)
MONOCYTES # BLD AUTO: 0.9 10E3/UL (ref 0–1.3)
MONOCYTES # BLD AUTO: 1.1 10E3/UL (ref 0–1.3)
MONOCYTES NFR BLD AUTO: 10 %
MONOCYTES NFR BLD AUTO: 12 %
MUCOUS THREADS #/AREA URNS LPF: PRESENT /LPF
MUCOUS THREADS #/AREA URNS LPF: PRESENT /LPF
NEUTROPHILS # BLD AUTO: 6.8 10E3/UL (ref 1.6–8.3)
NEUTROPHILS # BLD AUTO: 7.6 10E3/UL (ref 1.6–8.3)
NEUTROPHILS NFR BLD AUTO: 75 %
NEUTROPHILS NFR BLD AUTO: 81 %
NITRATE UR QL: NEGATIVE
NITRATE UR QL: POSITIVE
NRBC # BLD AUTO: 0 10E3/UL
NRBC # BLD AUTO: 0 10E3/UL
NRBC BLD AUTO-RTO: 0 /100
NRBC BLD AUTO-RTO: 0 /100
NT-PROBNP SERPL-MCNC: ABNORMAL PG/ML (ref 0–1800)
NT-PROBNP SERPL-MCNC: ABNORMAL PG/ML (ref 0–1800)
O2/TOTAL GAS SETTING VFR VENT: 0 %
O2/TOTAL GAS SETTING VFR VENT: 21 %
O2/TOTAL GAS SETTING VFR VENT: 3 %
O2/TOTAL GAS SETTING VFR VENT: 30 %
OXYHGB MFR BLD: 97 % (ref 92–100)
OXYHGB MFR BLDV: 71 % (ref 70–75)
P AXIS - MUSE: 14 DEGREES
P AXIS - MUSE: 39 DEGREES
P AXIS - MUSE: 46 DEGREES
PCO2 BLD: 38 MM HG (ref 35–45)
PCO2 BLDV: 36 MM HG (ref 40–50)
PCO2 BLDV: 43 MM HG (ref 40–50)
PCO2 BLDV: 43 MM HG (ref 40–50)
PH BLD: 7.51 [PH] (ref 7.35–7.45)
PH BLDV: 7.36 [PH] (ref 7.32–7.43)
PH BLDV: 7.44 [PH] (ref 7.32–7.43)
PH BLDV: 7.47 [PH] (ref 7.32–7.43)
PH UR STRIP: 5 [PH] (ref 5–7)
PH UR STRIP: 6 [PH] (ref 5–7)
PLATELET # BLD AUTO: 189 10E3/UL (ref 150–450)
PLATELET # BLD AUTO: 190 10E3/UL (ref 150–450)
PLATELET # BLD AUTO: 201 10E3/UL (ref 150–450)
PLATELET # BLD AUTO: 202 10E3/UL (ref 150–450)
PLATELET # BLD AUTO: 205 10E3/UL (ref 150–450)
PLATELET # BLD AUTO: 207 10E3/UL (ref 150–450)
PLATELET # BLD AUTO: 208 10E3/UL (ref 150–450)
PLATELET # BLD AUTO: 209 10E3/UL (ref 150–450)
PLATELET # BLD AUTO: 211 10E3/UL (ref 150–450)
PLATELET # BLD AUTO: 214 10E3/UL (ref 150–450)
PLATELET # BLD AUTO: 215 10E3/UL (ref 150–450)
PLATELET # BLD AUTO: 216 10E3/UL (ref 150–450)
PLATELET # BLD AUTO: 223 10E3/UL (ref 150–450)
PLATELET # BLD AUTO: 233 10E3/UL (ref 150–450)
PLATELET # BLD AUTO: 242 10E3/UL (ref 150–450)
PLATELET # BLD AUTO: 268 10E3/UL (ref 150–450)
PLATELET # BLD AUTO: 269 10E3/UL (ref 150–450)
PO2 BLD: 90 MM HG (ref 80–105)
PO2 BLDV: 42 MM HG (ref 25–47)
PO2 BLDV: 44 MM HG (ref 25–47)
PO2 BLDV: 62 MM HG (ref 25–47)
POTASSIUM BLD-SCNC: 3.1 MMOL/L (ref 3.4–5.3)
POTASSIUM BLD-SCNC: 3.2 MMOL/L (ref 3.4–5.3)
POTASSIUM BLD-SCNC: 3.3 MMOL/L (ref 3.4–5.3)
POTASSIUM BLD-SCNC: 3.4 MMOL/L (ref 3.4–5.3)
POTASSIUM BLD-SCNC: 3.5 MMOL/L (ref 3.4–5.3)
POTASSIUM BLD-SCNC: 3.6 MMOL/L (ref 3.4–5.3)
POTASSIUM BLD-SCNC: 3.6 MMOL/L (ref 3.4–5.3)
POTASSIUM BLD-SCNC: 3.7 MMOL/L (ref 3.4–5.3)
POTASSIUM BLD-SCNC: 3.9 MMOL/L (ref 3.4–5.3)
POTASSIUM BLD-SCNC: 4 MMOL/L (ref 3.5–5)
POTASSIUM BLD-SCNC: 4.1 MMOL/L (ref 3.4–5.3)
POTASSIUM BLD-SCNC: 4.2 MMOL/L (ref 3.4–5.3)
POTASSIUM BLD-SCNC: 4.2 MMOL/L (ref 3.5–5)
POTASSIUM BLD-SCNC: 4.4 MMOL/L (ref 3.4–5.3)
POTASSIUM BLD-SCNC: 4.4 MMOL/L (ref 3.5–5)
POTASSIUM BLD-SCNC: 4.9 MMOL/L (ref 3.4–5.3)
PR INTERVAL - MUSE: 168 MS
PR INTERVAL - MUSE: 188 MS
PR INTERVAL - MUSE: 200 MS
PROCALCITONIN SERPL-MCNC: 0.21 NG/ML
PROCALCITONIN SERPL-MCNC: 0.28 NG/ML
PROCALCITONIN SERPL-MCNC: 0.55 NG/ML
PROT SERPL-MCNC: 6.1 G/DL (ref 6.8–8.8)
PROT SERPL-MCNC: 6.4 G/DL (ref 6.8–8.8)
PROT SERPL-MCNC: 6.8 G/DL (ref 6.8–8.8)
QRS DURATION - MUSE: 104 MS
QRS DURATION - MUSE: 106 MS
QRS DURATION - MUSE: 110 MS
QT - MUSE: 366 MS
QT - MUSE: 394 MS
QT - MUSE: 398 MS
QTC - MUSE: 416 MS
QTC - MUSE: 422 MS
QTC - MUSE: 429 MS
R AXIS - MUSE: -8 DEGREES
R AXIS - MUSE: 15 DEGREES
R AXIS - MUSE: 4 DEGREES
RBC # BLD AUTO: 1.99 10E6/UL (ref 4.4–5.9)
RBC # BLD AUTO: 2.03 10E6/UL (ref 4.4–5.9)
RBC # BLD AUTO: 2.11 10E6/UL (ref 4.4–5.9)
RBC # BLD AUTO: 2.13 10E6/UL (ref 4.4–5.9)
RBC # BLD AUTO: 2.14 10E6/UL (ref 4.4–5.9)
RBC # BLD AUTO: 2.22 10E6/UL (ref 4.4–5.9)
RBC # BLD AUTO: 2.28 10E6/UL (ref 4.4–5.9)
RBC # BLD AUTO: 2.3 10E6/UL (ref 4.4–5.9)
RBC # BLD AUTO: 2.36 10E6/UL (ref 4.4–5.9)
RBC # BLD AUTO: 2.39 10E6/UL (ref 4.4–5.9)
RBC # BLD AUTO: 2.42 10E6/UL (ref 4.4–5.9)
RBC # BLD AUTO: 2.57 10E6/UL (ref 4.4–5.9)
RBC # BLD AUTO: 2.58 10E6/UL (ref 4.4–5.9)
RBC # BLD AUTO: 2.65 10E6/UL (ref 4.4–5.9)
RBC # BLD AUTO: 2.7 10E6/UL (ref 4.4–5.9)
RBC # BLD AUTO: 2.85 10E6/UL (ref 4.4–5.9)
RBC URINE: 11 /HPF
RBC URINE: 9 /HPF
SARS-COV-2 RNA RESP QL NAA+PROBE: NEGATIVE
SARS-COV-2 RNA RESP QL NAA+PROBE: NEGATIVE
SODIUM SERPL-SCNC: 130 MMOL/L (ref 133–144)
SODIUM SERPL-SCNC: 132 MMOL/L (ref 133–144)
SODIUM SERPL-SCNC: 132 MMOL/L (ref 133–144)
SODIUM SERPL-SCNC: 133 MMOL/L (ref 133–144)
SODIUM SERPL-SCNC: 134 MMOL/L (ref 133–144)
SODIUM SERPL-SCNC: 134 MMOL/L (ref 133–144)
SODIUM SERPL-SCNC: 135 MMOL/L (ref 133–144)
SODIUM SERPL-SCNC: 137 MMOL/L (ref 133–144)
SODIUM SERPL-SCNC: 137 MMOL/L (ref 136–145)
SODIUM SERPL-SCNC: 137 MMOL/L (ref 136–145)
SODIUM SERPL-SCNC: 138 MMOL/L (ref 133–144)
SODIUM SERPL-SCNC: 138 MMOL/L (ref 133–144)
SODIUM SERPL-SCNC: 138 MMOL/L (ref 136–145)
SODIUM SERPL-SCNC: 139 MMOL/L (ref 133–144)
SODIUM SERPL-SCNC: 140 MMOL/L (ref 133–144)
SP GR UR STRIP: 1.01 (ref 1–1.03)
SP GR UR STRIP: 1.01 (ref 1–1.03)
SPECIMEN EXPIRATION DATE: NORMAL
SPECIMEN EXPIRATION DATE: NORMAL
SYSTOLIC BLOOD PRESSURE - MUSE: NORMAL MMHG
T AXIS - MUSE: 150 DEGREES
T AXIS - MUSE: 157 DEGREES
T AXIS - MUSE: 200 DEGREES
TIBC SERPL-MCNC: 262 UG/DL (ref 240–430)
TRANSFERRIN SERPL-MCNC: 207 MG/DL (ref 210–360)
TROPONIN I SERPL HS-MCNC: 356 NG/L
TROPONIN I SERPL HS-MCNC: 359 NG/L
TROPONIN I SERPL HS-MCNC: 396 NG/L
TROPONIN I SERPL HS-MCNC: 44 NG/L
TROPONIN I SERPL HS-MCNC: 48 NG/L
UNIT ABO/RH: NORMAL
UNIT NUMBER: NORMAL
UNIT STATUS: NORMAL
UNIT TYPE ISBT: 5100
UROBILINOGEN UR STRIP-MCNC: <2 MG/DL
UROBILINOGEN UR STRIP-MCNC: NORMAL MG/DL
VENTRICULAR RATE- MUSE: 67 BPM
VENTRICULAR RATE- MUSE: 70 BPM
VENTRICULAR RATE- MUSE: 80 BPM
WBC # BLD AUTO: 10.4 10E3/UL (ref 4–11)
WBC # BLD AUTO: 10.5 10E3/UL (ref 4–11)
WBC # BLD AUTO: 10.5 10E3/UL (ref 4–11)
WBC # BLD AUTO: 11.1 10E3/UL (ref 4–11)
WBC # BLD AUTO: 11.9 10E3/UL (ref 4–11)
WBC # BLD AUTO: 14 10E3/UL (ref 4–11)
WBC # BLD AUTO: 14.7 10E3/UL (ref 4–11)
WBC # BLD AUTO: 5.7 10E3/UL (ref 4–11)
WBC # BLD AUTO: 7.6 10E3/UL (ref 4–11)
WBC # BLD AUTO: 7.9 10E3/UL (ref 4–11)
WBC # BLD AUTO: 8.5 10E3/UL (ref 4–11)
WBC # BLD AUTO: 8.8 10E3/UL (ref 4–11)
WBC # BLD AUTO: 9 10E3/UL (ref 4–11)
WBC # BLD AUTO: 9.3 10E3/UL (ref 4–11)
WBC # BLD AUTO: 9.5 10E3/UL (ref 4–11)
WBC # BLD AUTO: 9.7 10E3/UL (ref 4–11)
WBC # BLD AUTO: 9.7 10E3/UL (ref 4–11)
WBC CLUMPS #/AREA URNS HPF: PRESENT /HPF
WBC URINE: 29 /HPF
WBC URINE: 4 /HPF

## 2022-01-01 PROCEDURE — 36415 COLL VENOUS BLD VENIPUNCTURE: CPT | Performed by: NURSE PRACTITIONER

## 2022-01-01 PROCEDURE — 83735 ASSAY OF MAGNESIUM: CPT | Performed by: HOSPITALIST

## 2022-01-01 PROCEDURE — 93971 EXTREMITY STUDY: CPT

## 2022-01-01 PROCEDURE — 71045 X-RAY EXAM CHEST 1 VIEW: CPT

## 2022-01-01 PROCEDURE — 250N000011 HC RX IP 250 OP 636: Performed by: HOSPITALIST

## 2022-01-01 PROCEDURE — 36415 COLL VENOUS BLD VENIPUNCTURE: CPT | Performed by: HOSPITALIST

## 2022-01-01 PROCEDURE — 99233 SBSQ HOSP IP/OBS HIGH 50: CPT | Performed by: HOSPITALIST

## 2022-01-01 PROCEDURE — G0378 HOSPITAL OBSERVATION PER HR: HCPCS

## 2022-01-01 PROCEDURE — 250N000013 HC RX MED GY IP 250 OP 250 PS 637: Performed by: HOSPITALIST

## 2022-01-01 PROCEDURE — 250N000011 HC RX IP 250 OP 636: Performed by: INTERNAL MEDICINE

## 2022-01-01 PROCEDURE — 84132 ASSAY OF SERUM POTASSIUM: CPT | Performed by: HOSPITALIST

## 2022-01-01 PROCEDURE — 250N000013 HC RX MED GY IP 250 OP 250 PS 637: Performed by: INTERNAL MEDICINE

## 2022-01-01 PROCEDURE — 210N000002 HC R&B HEART CARE

## 2022-01-01 PROCEDURE — 82746 ASSAY OF FOLIC ACID SERUM: CPT | Performed by: HOSPITALIST

## 2022-01-01 PROCEDURE — 99222 1ST HOSP IP/OBS MODERATE 55: CPT | Performed by: SURGERY

## 2022-01-01 PROCEDURE — 97116 GAIT TRAINING THERAPY: CPT | Mod: GP | Performed by: PHYSICAL THERAPIST

## 2022-01-01 PROCEDURE — P9604 ONE-WAY ALLOW PRORATED TRIP: HCPCS | Mod: ORL | Performed by: INTERNAL MEDICINE

## 2022-01-01 PROCEDURE — 84132 ASSAY OF SERUM POTASSIUM: CPT | Performed by: INTERNAL MEDICINE

## 2022-01-01 PROCEDURE — 81001 URINALYSIS AUTO W/SCOPE: CPT | Performed by: HOSPITALIST

## 2022-01-01 PROCEDURE — 85027 COMPLETE CBC AUTOMATED: CPT | Performed by: HOSPITALIST

## 2022-01-01 PROCEDURE — 85018 HEMOGLOBIN: CPT | Mod: ORL | Performed by: INTERNAL MEDICINE

## 2022-01-01 PROCEDURE — 82248 BILIRUBIN DIRECT: CPT | Performed by: HOSPITALIST

## 2022-01-01 PROCEDURE — 81001 URINALYSIS AUTO W/SCOPE: CPT | Mod: ORL | Performed by: INTERNAL MEDICINE

## 2022-01-01 PROCEDURE — 87186 SC STD MICRODIL/AGAR DIL: CPT | Mod: ORL | Performed by: INTERNAL MEDICINE

## 2022-01-01 PROCEDURE — 80048 BASIC METABOLIC PNL TOTAL CA: CPT | Performed by: HOSPITALIST

## 2022-01-01 PROCEDURE — 85025 COMPLETE CBC W/AUTO DIFF WBC: CPT | Mod: ORL | Performed by: NURSE PRACTITIONER

## 2022-01-01 PROCEDURE — 258N000003 HC RX IP 258 OP 636: Performed by: NURSE PRACTITIONER

## 2022-01-01 PROCEDURE — 84145 PROCALCITONIN (PCT): CPT | Performed by: HOSPITALIST

## 2022-01-01 PROCEDURE — 85018 HEMOGLOBIN: CPT | Performed by: HOSPITALIST

## 2022-01-01 PROCEDURE — 83880 ASSAY OF NATRIURETIC PEPTIDE: CPT | Performed by: HOSPITALIST

## 2022-01-01 PROCEDURE — 36415 COLL VENOUS BLD VENIPUNCTURE: CPT | Mod: ORL | Performed by: INTERNAL MEDICINE

## 2022-01-01 PROCEDURE — 80053 COMPREHEN METABOLIC PANEL: CPT | Performed by: EMERGENCY MEDICINE

## 2022-01-01 PROCEDURE — 82310 ASSAY OF CALCIUM: CPT | Performed by: INTERNAL MEDICINE

## 2022-01-01 PROCEDURE — 82805 BLOOD GASES W/O2 SATURATION: CPT | Performed by: EMERGENCY MEDICINE

## 2022-01-01 PROCEDURE — 99232 SBSQ HOSP IP/OBS MODERATE 35: CPT | Performed by: INTERNAL MEDICINE

## 2022-01-01 PROCEDURE — 85027 COMPLETE CBC AUTOMATED: CPT | Mod: ORL | Performed by: INTERNAL MEDICINE

## 2022-01-01 PROCEDURE — C9803 HOPD COVID-19 SPEC COLLECT: HCPCS

## 2022-01-01 PROCEDURE — 120N000001 HC R&B MED SURG/OB

## 2022-01-01 PROCEDURE — 99232 SBSQ HOSP IP/OBS MODERATE 35: CPT | Performed by: HOSPITALIST

## 2022-01-01 PROCEDURE — 36415 COLL VENOUS BLD VENIPUNCTURE: CPT | Mod: ORL | Performed by: NURSE PRACTITIONER

## 2022-01-01 PROCEDURE — A9540 TC99M MAA: HCPCS | Performed by: HOSPITALIST

## 2022-01-01 PROCEDURE — 99223 1ST HOSP IP/OBS HIGH 75: CPT | Mod: AI | Performed by: HOSPITALIST

## 2022-01-01 PROCEDURE — 36415 COLL VENOUS BLD VENIPUNCTURE: CPT | Performed by: INTERNAL MEDICINE

## 2022-01-01 PROCEDURE — 86923 COMPATIBILITY TEST ELECTRIC: CPT | Performed by: EMERGENCY MEDICINE

## 2022-01-01 PROCEDURE — 96374 THER/PROPH/DIAG INJ IV PUSH: CPT

## 2022-01-01 PROCEDURE — 5A09357 ASSISTANCE WITH RESPIRATORY VENTILATION, LESS THAN 24 CONSECUTIVE HOURS, CONTINUOUS POSITIVE AIRWAY PRESSURE: ICD-10-PCS | Performed by: EMERGENCY MEDICINE

## 2022-01-01 PROCEDURE — 82805 BLOOD GASES W/O2 SATURATION: CPT | Performed by: HOSPITALIST

## 2022-01-01 PROCEDURE — 97530 THERAPEUTIC ACTIVITIES: CPT | Mod: GP | Performed by: PHYSICAL THERAPIST

## 2022-01-01 PROCEDURE — 93005 ELECTROCARDIOGRAM TRACING: CPT

## 2022-01-01 PROCEDURE — 82310 ASSAY OF CALCIUM: CPT | Performed by: HOSPITALIST

## 2022-01-01 PROCEDURE — 97165 OT EVAL LOW COMPLEX 30 MIN: CPT | Mod: GO | Performed by: OCCUPATIONAL THERAPIST

## 2022-01-01 PROCEDURE — 87636 SARSCOV2 & INF A&B AMP PRB: CPT | Performed by: EMERGENCY MEDICINE

## 2022-01-01 PROCEDURE — 96375 TX/PRO/DX INJ NEW DRUG ADDON: CPT

## 2022-01-01 PROCEDURE — 120N000013 HC R&B IMCU

## 2022-01-01 PROCEDURE — 94660 CPAP INITIATION&MGMT: CPT

## 2022-01-01 PROCEDURE — 93306 TTE W/DOPPLER COMPLETE: CPT | Mod: 26 | Performed by: INTERNAL MEDICINE

## 2022-01-01 PROCEDURE — 72125 CT NECK SPINE W/O DYE: CPT

## 2022-01-01 PROCEDURE — 85014 HEMATOCRIT: CPT | Performed by: INTERNAL MEDICINE

## 2022-01-01 PROCEDURE — 250N000011 HC RX IP 250 OP 636: Performed by: NURSE PRACTITIONER

## 2022-01-01 PROCEDURE — 74176 CT ABD & PELVIS W/O CONTRAST: CPT

## 2022-01-01 PROCEDURE — 83550 IRON BINDING TEST: CPT | Performed by: HOSPITALIST

## 2022-01-01 PROCEDURE — 99291 CRITICAL CARE FIRST HOUR: CPT | Performed by: NURSE PRACTITIONER

## 2022-01-01 PROCEDURE — 97535 SELF CARE MNGMENT TRAINING: CPT | Mod: GO | Performed by: OCCUPATIONAL THERAPIST

## 2022-01-01 PROCEDURE — 99292 CRITICAL CARE ADDL 30 MIN: CPT

## 2022-01-01 PROCEDURE — 99207 PR CDG-CODE CATEGORY CHANGED: CPT | Performed by: INTERNAL MEDICINE

## 2022-01-01 PROCEDURE — 87635 SARS-COV-2 COVID-19 AMP PRB: CPT | Performed by: EMERGENCY MEDICINE

## 2022-01-01 PROCEDURE — 36600 WITHDRAWAL OF ARTERIAL BLOOD: CPT

## 2022-01-01 PROCEDURE — 999N000157 HC STATISTIC RCP TIME EA 10 MIN

## 2022-01-01 PROCEDURE — 97110 THERAPEUTIC EXERCISES: CPT | Mod: GO

## 2022-01-01 PROCEDURE — 83605 ASSAY OF LACTIC ACID: CPT | Performed by: NURSE PRACTITIONER

## 2022-01-01 PROCEDURE — P9603 ONE-WAY ALLOW PRORATED MILES: HCPCS | Mod: ORL | Performed by: NURSE PRACTITIONER

## 2022-01-01 PROCEDURE — 80048 BASIC METABOLIC PNL TOTAL CA: CPT | Mod: ORL | Performed by: INTERNAL MEDICINE

## 2022-01-01 PROCEDURE — 250N000009 HC RX 250: Performed by: HOSPITALIST

## 2022-01-01 PROCEDURE — 99207 PR NO CHARGE LOS: CPT | Performed by: NURSE PRACTITIONER

## 2022-01-01 PROCEDURE — 36430 TRANSFUSION BLD/BLD COMPNT: CPT

## 2022-01-01 PROCEDURE — 85014 HEMATOCRIT: CPT | Performed by: HOSPITALIST

## 2022-01-01 PROCEDURE — 84484 ASSAY OF TROPONIN QUANT: CPT | Performed by: HOSPITALIST

## 2022-01-01 PROCEDURE — 85048 AUTOMATED LEUKOCYTE COUNT: CPT | Mod: ORL | Performed by: INTERNAL MEDICINE

## 2022-01-01 PROCEDURE — 99239 HOSP IP/OBS DSCHRG MGMT >30: CPT | Performed by: INTERNAL MEDICINE

## 2022-01-01 PROCEDURE — 99207 PR SC NO CHARGE VISIT: CPT | Performed by: INTERNAL MEDICINE

## 2022-01-01 PROCEDURE — 99285 EMERGENCY DEPT VISIT HI MDM: CPT | Mod: 25

## 2022-01-01 PROCEDURE — 71046 X-RAY EXAM CHEST 2 VIEWS: CPT

## 2022-01-01 PROCEDURE — 85379 FIBRIN DEGRADATION QUANT: CPT | Performed by: HOSPITALIST

## 2022-01-01 PROCEDURE — 99233 SBSQ HOSP IP/OBS HIGH 50: CPT | Performed by: INTERNAL MEDICINE

## 2022-01-01 PROCEDURE — 250N000009 HC RX 250: Performed by: EMERGENCY MEDICINE

## 2022-01-01 PROCEDURE — 82435 ASSAY OF BLOOD CHLORIDE: CPT | Performed by: HOSPITALIST

## 2022-01-01 PROCEDURE — 82140 ASSAY OF AMMONIA: CPT | Performed by: HOSPITALIST

## 2022-01-01 PROCEDURE — 96376 TX/PRO/DX INJ SAME DRUG ADON: CPT

## 2022-01-01 PROCEDURE — 250N000009 HC RX 250: Performed by: INTERNAL MEDICINE

## 2022-01-01 PROCEDURE — 250N000011 HC RX IP 250 OP 636: Performed by: EMERGENCY MEDICINE

## 2022-01-01 PROCEDURE — 82803 BLOOD GASES ANY COMBINATION: CPT | Performed by: HOSPITALIST

## 2022-01-01 PROCEDURE — 72170 X-RAY EXAM OF PELVIS: CPT

## 2022-01-01 PROCEDURE — 99220 PR INITIAL OBSERVATION CARE,LEVEL III: CPT | Performed by: INTERNAL MEDICINE

## 2022-01-01 PROCEDURE — 82374 ASSAY BLOOD CARBON DIOXIDE: CPT | Performed by: HOSPITALIST

## 2022-01-01 PROCEDURE — 97530 THERAPEUTIC ACTIVITIES: CPT | Mod: GP

## 2022-01-01 PROCEDURE — P9016 RBC LEUKOCYTES REDUCED: HCPCS | Performed by: EMERGENCY MEDICINE

## 2022-01-01 PROCEDURE — 84466 ASSAY OF TRANSFERRIN: CPT | Performed by: HOSPITALIST

## 2022-01-01 PROCEDURE — 250N000013 HC RX MED GY IP 250 OP 250 PS 637: Performed by: NURSE PRACTITIONER

## 2022-01-01 PROCEDURE — 85025 COMPLETE CBC W/AUTO DIFF WBC: CPT | Performed by: EMERGENCY MEDICINE

## 2022-01-01 PROCEDURE — 97161 PT EVAL LOW COMPLEX 20 MIN: CPT | Mod: GP

## 2022-01-01 PROCEDURE — 36415 COLL VENOUS BLD VENIPUNCTURE: CPT | Performed by: EMERGENCY MEDICINE

## 2022-01-01 PROCEDURE — 93306 TTE W/DOPPLER COMPLETE: CPT

## 2022-01-01 PROCEDURE — 99232 SBSQ HOSP IP/OBS MODERATE 35: CPT | Mod: FS | Performed by: NURSE PRACTITIONER

## 2022-01-01 PROCEDURE — 84484 ASSAY OF TROPONIN QUANT: CPT | Performed by: EMERGENCY MEDICINE

## 2022-01-01 PROCEDURE — 85049 AUTOMATED PLATELET COUNT: CPT | Performed by: HOSPITALIST

## 2022-01-01 PROCEDURE — 82803 BLOOD GASES ANY COMBINATION: CPT | Performed by: NURSE PRACTITIONER

## 2022-01-01 PROCEDURE — 99291 CRITICAL CARE FIRST HOUR: CPT

## 2022-01-01 PROCEDURE — 343N000001 HC RX 343: Performed by: HOSPITALIST

## 2022-01-01 PROCEDURE — 84484 ASSAY OF TROPONIN QUANT: CPT | Performed by: INTERNAL MEDICINE

## 2022-01-01 PROCEDURE — 97161 PT EVAL LOW COMPLEX 20 MIN: CPT | Mod: GP | Performed by: PHYSICAL THERAPIST

## 2022-01-01 PROCEDURE — 51702 INSERT TEMP BLADDER CATH: CPT

## 2022-01-01 PROCEDURE — 250N000013 HC RX MED GY IP 250 OP 250 PS 637: Performed by: EMERGENCY MEDICINE

## 2022-01-01 PROCEDURE — 99222 1ST HOSP IP/OBS MODERATE 55: CPT | Mod: 25 | Performed by: INTERNAL MEDICINE

## 2022-01-01 PROCEDURE — 78580 LUNG PERFUSION IMAGING: CPT

## 2022-01-01 PROCEDURE — 84145 PROCALCITONIN (PCT): CPT | Performed by: NURSE PRACTITIONER

## 2022-01-01 PROCEDURE — 86901 BLOOD TYPING SEROLOGIC RH(D): CPT | Performed by: EMERGENCY MEDICINE

## 2022-01-01 PROCEDURE — 70450 CT HEAD/BRAIN W/O DYE: CPT

## 2022-01-01 PROCEDURE — 83880 ASSAY OF NATRIURETIC PEPTIDE: CPT | Performed by: EMERGENCY MEDICINE

## 2022-01-01 PROCEDURE — 86901 BLOOD TYPING SEROLOGIC RH(D): CPT | Performed by: HOSPITALIST

## 2022-01-01 PROCEDURE — 85027 COMPLETE CBC AUTOMATED: CPT | Performed by: EMERGENCY MEDICINE

## 2022-01-01 RX ORDER — CARVEDILOL 12.5 MG/1
12.5 TABLET ORAL 2 TIMES DAILY
Status: DISCONTINUED | OUTPATIENT
Start: 2022-01-01 | End: 2022-01-01 | Stop reason: HOSPADM

## 2022-01-01 RX ORDER — AMLODIPINE BESYLATE 10 MG/1
10 TABLET ORAL DAILY
Status: DISCONTINUED | OUTPATIENT
Start: 2022-01-01 | End: 2022-01-01

## 2022-01-01 RX ORDER — AMLODIPINE BESYLATE 5 MG/1
5 TABLET ORAL DAILY
DISCHARGE
Start: 2022-01-01

## 2022-01-01 RX ORDER — FUROSEMIDE 40 MG
40 TABLET ORAL DAILY
Status: ON HOLD | DISCHARGE
Start: 2022-01-01 | End: 2022-01-01

## 2022-01-01 RX ORDER — POTASSIUM CHLORIDE 20MEQ/15ML
20 LIQUID (ML) ORAL ONCE
Status: COMPLETED | OUTPATIENT
Start: 2022-01-01 | End: 2022-01-01

## 2022-01-01 RX ORDER — NALOXONE HYDROCHLORIDE 0.4 MG/ML
0.2 INJECTION, SOLUTION INTRAMUSCULAR; INTRAVENOUS; SUBCUTANEOUS
Status: DISCONTINUED | OUTPATIENT
Start: 2022-01-01 | End: 2022-01-01 | Stop reason: HOSPADM

## 2022-01-01 RX ORDER — HALOPERIDOL 5 MG/ML
2 INJECTION INTRAMUSCULAR ONCE
Status: COMPLETED | OUTPATIENT
Start: 2022-01-01 | End: 2022-01-01

## 2022-01-01 RX ORDER — CARVEDILOL 12.5 MG/1
12.5 TABLET ORAL 2 TIMES DAILY
COMMUNITY
Start: 2021-01-01

## 2022-01-01 RX ORDER — PROCHLORPERAZINE MALEATE 5 MG
5 TABLET ORAL EVERY 6 HOURS PRN
Status: DISCONTINUED | OUTPATIENT
Start: 2022-01-01 | End: 2022-01-01 | Stop reason: HOSPADM

## 2022-01-01 RX ORDER — LIDOCAINE 40 MG/G
CREAM TOPICAL
Status: DISCONTINUED | OUTPATIENT
Start: 2022-01-01 | End: 2022-01-01 | Stop reason: HOSPADM

## 2022-01-01 RX ORDER — SODIUM CHLORIDE AND POTASSIUM CHLORIDE 150; 900 MG/100ML; MG/100ML
INJECTION, SOLUTION INTRAVENOUS CONTINUOUS
Status: ACTIVE | OUTPATIENT
Start: 2022-01-01 | End: 2022-01-01

## 2022-01-01 RX ORDER — FUROSEMIDE 40 MG
40 TABLET ORAL
Status: DISCONTINUED | OUTPATIENT
Start: 2022-01-01 | End: 2022-01-01 | Stop reason: HOSPADM

## 2022-01-01 RX ORDER — NALOXONE HYDROCHLORIDE 0.4 MG/ML
0.4 INJECTION, SOLUTION INTRAMUSCULAR; INTRAVENOUS; SUBCUTANEOUS
Status: DISCONTINUED | OUTPATIENT
Start: 2022-01-01 | End: 2022-01-01 | Stop reason: HOSPADM

## 2022-01-01 RX ORDER — ISOSORBIDE MONONITRATE 30 MG/1
30 TABLET, EXTENDED RELEASE ORAL DAILY
Status: DISCONTINUED | OUTPATIENT
Start: 2022-01-01 | End: 2022-01-01 | Stop reason: HOSPADM

## 2022-01-01 RX ORDER — HYDRALAZINE HYDROCHLORIDE 25 MG/1
25 TABLET, FILM COATED ORAL EVERY 8 HOURS
DISCHARGE
Start: 2022-01-01

## 2022-01-01 RX ORDER — LIDOCAINE HYDROCHLORIDE 20 MG/ML
10 JELLY TOPICAL ONCE
Status: DISCONTINUED | OUTPATIENT
Start: 2022-01-01 | End: 2022-01-01

## 2022-01-01 RX ORDER — HEPARIN SODIUM 5000 [USP'U]/.5ML
5000 INJECTION, SOLUTION INTRAVENOUS; SUBCUTANEOUS EVERY 12 HOURS
Status: DISCONTINUED | OUTPATIENT
Start: 2022-01-01 | End: 2022-01-01 | Stop reason: HOSPADM

## 2022-01-01 RX ORDER — CALCIUM CARBONATE 500 MG/1
500 TABLET, CHEWABLE ORAL 2 TIMES DAILY PRN
Status: DISCONTINUED | OUTPATIENT
Start: 2022-01-01 | End: 2022-01-01 | Stop reason: HOSPADM

## 2022-01-01 RX ORDER — LANOLIN ALCOHOL/MO/W.PET/CERES
1000 CREAM (GRAM) TOPICAL DAILY
Status: DISCONTINUED | OUTPATIENT
Start: 2022-01-01 | End: 2022-01-01 | Stop reason: HOSPADM

## 2022-01-01 RX ORDER — OXYBUTYNIN CHLORIDE 5 MG/1
5 TABLET ORAL 3 TIMES DAILY
Status: DISCONTINUED | OUTPATIENT
Start: 2022-01-01 | End: 2022-01-01

## 2022-01-01 RX ORDER — POTASSIUM CHLORIDE 1500 MG/1
20 TABLET, EXTENDED RELEASE ORAL ONCE
Status: COMPLETED | OUTPATIENT
Start: 2022-01-01 | End: 2022-01-01

## 2022-01-01 RX ORDER — BISACODYL 10 MG
10 SUPPOSITORY, RECTAL RECTAL DAILY PRN
Status: DISCONTINUED | OUTPATIENT
Start: 2022-01-01 | End: 2022-01-01 | Stop reason: HOSPADM

## 2022-01-01 RX ORDER — FUROSEMIDE 40 MG
40 TABLET ORAL
Status: DISCONTINUED | OUTPATIENT
Start: 2022-01-01 | End: 2022-01-01

## 2022-01-01 RX ORDER — AMLODIPINE BESYLATE 5 MG/1
5 TABLET ORAL DAILY
Status: DISCONTINUED | OUTPATIENT
Start: 2022-01-01 | End: 2022-01-01

## 2022-01-01 RX ORDER — ATROPA BELLADONNA AND OPIUM 16.2; 6 MG/1; MG/1
60 SUPPOSITORY RECTAL EVERY 8 HOURS PRN
Status: DISCONTINUED | OUTPATIENT
Start: 2022-01-01 | End: 2022-01-01 | Stop reason: HOSPADM

## 2022-01-01 RX ORDER — POTASSIUM CHLORIDE 1500 MG/1
40 TABLET, EXTENDED RELEASE ORAL ONCE
Status: COMPLETED | OUTPATIENT
Start: 2022-01-01 | End: 2022-01-01

## 2022-01-01 RX ORDER — ESCITALOPRAM OXALATE 5 MG/1
5 TABLET ORAL DAILY
Status: DISCONTINUED | OUTPATIENT
Start: 2022-01-01 | End: 2022-01-01 | Stop reason: HOSPADM

## 2022-01-01 RX ORDER — CALCIUM CARBONATE 500 MG/1
1 TABLET, CHEWABLE ORAL 2 TIMES DAILY PRN
COMMUNITY

## 2022-01-01 RX ORDER — ONDANSETRON 2 MG/ML
4 INJECTION INTRAMUSCULAR; INTRAVENOUS EVERY 6 HOURS PRN
Status: DISCONTINUED | OUTPATIENT
Start: 2022-01-01 | End: 2022-01-01 | Stop reason: HOSPADM

## 2022-01-01 RX ORDER — POTASSIUM CHLORIDE 1.5 G/1.58G
40 POWDER, FOR SOLUTION ORAL ONCE
Status: DISCONTINUED | OUTPATIENT
Start: 2022-01-01 | End: 2022-01-01

## 2022-01-01 RX ORDER — FUROSEMIDE 40 MG
40 TABLET ORAL DAILY
Status: DISCONTINUED | OUTPATIENT
Start: 2022-01-01 | End: 2022-01-01 | Stop reason: HOSPADM

## 2022-01-01 RX ORDER — SENNOSIDES 8.6 MG
1-2 TABLET ORAL 2 TIMES DAILY PRN
Status: DISCONTINUED | OUTPATIENT
Start: 2022-01-01 | End: 2022-01-01 | Stop reason: HOSPADM

## 2022-01-01 RX ORDER — ACETAMINOPHEN 325 MG/1
650 TABLET ORAL EVERY 6 HOURS PRN
Status: CANCELLED | OUTPATIENT
Start: 2022-01-01

## 2022-01-01 RX ORDER — HYDROMORPHONE HCL IN WATER/PF 6 MG/30 ML
0.2 PATIENT CONTROLLED ANALGESIA SYRINGE INTRAVENOUS
Status: DISCONTINUED | OUTPATIENT
Start: 2022-01-01 | End: 2022-01-01 | Stop reason: HOSPADM

## 2022-01-01 RX ORDER — ISOSORBIDE MONONITRATE 30 MG/1
30 TABLET, EXTENDED RELEASE ORAL DAILY
DISCHARGE
Start: 2022-01-01

## 2022-01-01 RX ORDER — AMOXICILLIN 250 MG
1 CAPSULE ORAL 2 TIMES DAILY PRN
Status: DISCONTINUED | OUTPATIENT
Start: 2022-01-01 | End: 2022-01-01 | Stop reason: HOSPADM

## 2022-01-01 RX ORDER — ACETAMINOPHEN 500 MG
1000 TABLET ORAL 3 TIMES DAILY
COMMUNITY
Start: 2021-01-19

## 2022-01-01 RX ORDER — HYDROMORPHONE HCL IN WATER/PF 6 MG/30 ML
0.2 PATIENT CONTROLLED ANALGESIA SYRINGE INTRAVENOUS ONCE
Status: COMPLETED | OUTPATIENT
Start: 2022-01-01 | End: 2022-01-01

## 2022-01-01 RX ORDER — ONDANSETRON 4 MG/1
4 TABLET, ORALLY DISINTEGRATING ORAL EVERY 6 HOURS PRN
Status: DISCONTINUED | OUTPATIENT
Start: 2022-01-01 | End: 2022-01-01 | Stop reason: HOSPADM

## 2022-01-01 RX ORDER — OLANZAPINE 5 MG/1
5 TABLET, ORALLY DISINTEGRATING ORAL 2 TIMES DAILY PRN
Status: DISCONTINUED | OUTPATIENT
Start: 2022-01-01 | End: 2022-01-01 | Stop reason: HOSPADM

## 2022-01-01 RX ORDER — ACETAMINOPHEN 650 MG/1
650 SUPPOSITORY RECTAL EVERY 4 HOURS PRN
Status: DISCONTINUED | OUTPATIENT
Start: 2022-01-01 | End: 2022-01-01 | Stop reason: HOSPADM

## 2022-01-01 RX ORDER — AMLODIPINE BESYLATE 2.5 MG/1
2.5 TABLET ORAL DAILY
Status: DISCONTINUED | OUTPATIENT
Start: 2022-01-01 | End: 2022-01-01

## 2022-01-01 RX ORDER — ACETAMINOPHEN 500 MG
1000 TABLET ORAL 3 TIMES DAILY
Status: DISCONTINUED | OUTPATIENT
Start: 2022-01-01 | End: 2022-01-01

## 2022-01-01 RX ORDER — LORAZEPAM 2 MG/ML
0.5 INJECTION INTRAMUSCULAR EVERY 4 HOURS PRN
Status: DISCONTINUED | OUTPATIENT
Start: 2022-01-01 | End: 2022-01-01 | Stop reason: HOSPADM

## 2022-01-01 RX ORDER — HYDRALAZINE HYDROCHLORIDE 20 MG/ML
10 INJECTION INTRAMUSCULAR; INTRAVENOUS EVERY 6 HOURS PRN
Status: DISCONTINUED | OUTPATIENT
Start: 2022-01-01 | End: 2022-01-01 | Stop reason: HOSPADM

## 2022-01-01 RX ORDER — LORAZEPAM 2 MG/ML
0.5 INJECTION INTRAMUSCULAR ONCE
Status: COMPLETED | OUTPATIENT
Start: 2022-01-01 | End: 2022-01-01

## 2022-01-01 RX ORDER — TAMSULOSIN HYDROCHLORIDE 0.4 MG/1
0.4 CAPSULE ORAL DAILY
Status: DISCONTINUED | OUTPATIENT
Start: 2022-01-01 | End: 2022-01-01 | Stop reason: HOSPADM

## 2022-01-01 RX ORDER — HYDRALAZINE HYDROCHLORIDE 25 MG/1
25 TABLET, FILM COATED ORAL EVERY 8 HOURS SCHEDULED
Status: DISCONTINUED | OUTPATIENT
Start: 2022-01-01 | End: 2022-01-01 | Stop reason: HOSPADM

## 2022-01-01 RX ORDER — FUROSEMIDE 10 MG/ML
40 INJECTION INTRAMUSCULAR; INTRAVENOUS ONCE
Status: COMPLETED | OUTPATIENT
Start: 2022-01-01 | End: 2022-01-01

## 2022-01-01 RX ORDER — SIMETHICONE 80 MG
80 TABLET,CHEWABLE ORAL EVERY 6 HOURS PRN
Status: DISCONTINUED | OUTPATIENT
Start: 2022-01-01 | End: 2022-01-01 | Stop reason: HOSPADM

## 2022-01-01 RX ORDER — SIMETHICONE 80 MG
80 TABLET,CHEWABLE ORAL EVERY 6 HOURS PRN
DISCHARGE
Start: 2022-01-01

## 2022-01-01 RX ORDER — ESCITALOPRAM OXALATE 5 MG/1
5 TABLET ORAL DAILY
COMMUNITY

## 2022-01-01 RX ORDER — LORAZEPAM 2 MG/ML
.5-1 INJECTION INTRAMUSCULAR ONCE
Status: COMPLETED | OUTPATIENT
Start: 2022-01-01 | End: 2022-01-01

## 2022-01-01 RX ORDER — OLANZAPINE 5 MG/1
5 TABLET, ORALLY DISINTEGRATING ORAL ONCE
Status: COMPLETED | OUTPATIENT
Start: 2022-01-01 | End: 2022-01-01

## 2022-01-01 RX ORDER — FUROSEMIDE 10 MG/ML
40 INJECTION INTRAMUSCULAR; INTRAVENOUS EVERY 12 HOURS
Status: DISCONTINUED | OUTPATIENT
Start: 2022-01-01 | End: 2022-01-01

## 2022-01-01 RX ORDER — FUROSEMIDE 10 MG/ML
60 INJECTION INTRAMUSCULAR; INTRAVENOUS EVERY 8 HOURS
Status: DISCONTINUED | OUTPATIENT
Start: 2022-01-01 | End: 2022-01-01

## 2022-01-01 RX ORDER — FUROSEMIDE 40 MG
40 TABLET ORAL
DISCHARGE
Start: 2022-01-01

## 2022-01-01 RX ORDER — MULTIPLE VITAMINS W/ MINERALS TAB 9MG-400MCG
1 TAB ORAL DAILY
Status: DISCONTINUED | OUTPATIENT
Start: 2022-01-01 | End: 2022-01-01 | Stop reason: HOSPADM

## 2022-01-01 RX ORDER — LIDOCAINE HYDROCHLORIDE 20 MG/ML
JELLY TOPICAL EVERY 4 HOURS PRN
Status: DISCONTINUED | OUTPATIENT
Start: 2022-01-01 | End: 2022-01-01 | Stop reason: HOSPADM

## 2022-01-01 RX ORDER — AMLODIPINE BESYLATE 2.5 MG/1
2.5 TABLET ORAL DAILY
Status: DISCONTINUED | OUTPATIENT
Start: 2022-01-01 | End: 2022-01-01 | Stop reason: HOSPADM

## 2022-01-01 RX ORDER — POTASSIUM CHLORIDE 1500 MG/1
20 TABLET, EXTENDED RELEASE ORAL 2 TIMES DAILY
COMMUNITY
Start: 2021-01-01

## 2022-01-01 RX ORDER — NITROGLYCERIN 0.4 MG/1
0.4 TABLET SUBLINGUAL EVERY 5 MIN PRN
Status: DISCONTINUED | OUTPATIENT
Start: 2022-01-01 | End: 2022-01-01 | Stop reason: HOSPADM

## 2022-01-01 RX ORDER — HYDROMORPHONE HCL IN WATER/PF 6 MG/30 ML
0.2 PATIENT CONTROLLED ANALGESIA SYRINGE INTRAVENOUS
Status: DISCONTINUED | OUTPATIENT
Start: 2022-01-01 | End: 2022-01-01

## 2022-01-01 RX ORDER — AMLODIPINE BESYLATE 10 MG/1
10 TABLET ORAL DAILY
Status: ON HOLD | COMMUNITY
Start: 2021-01-01 | End: 2022-01-01

## 2022-01-01 RX ORDER — CARVEDILOL 6.25 MG/1
6.25 TABLET ORAL ONCE
Status: COMPLETED | OUTPATIENT
Start: 2022-01-01 | End: 2022-01-01

## 2022-01-01 RX ORDER — CARVEDILOL 12.5 MG/1
12.5 TABLET ORAL 2 TIMES DAILY
Status: DISCONTINUED | OUTPATIENT
Start: 2022-01-01 | End: 2022-01-01

## 2022-01-01 RX ORDER — POTASSIUM CHLORIDE 1.5 G/1.58G
40 POWDER, FOR SOLUTION ORAL ONCE
Status: COMPLETED | OUTPATIENT
Start: 2022-01-01 | End: 2022-01-01

## 2022-01-01 RX ORDER — POLYETHYLENE GLYCOL 3350 17 G/17G
17 POWDER, FOR SOLUTION ORAL DAILY
Status: DISCONTINUED | OUTPATIENT
Start: 2022-01-01 | End: 2022-01-01 | Stop reason: HOSPADM

## 2022-01-01 RX ORDER — LIDOCAINE 40 MG/G
CREAM TOPICAL
Status: DISCONTINUED | OUTPATIENT
Start: 2022-01-01 | End: 2022-01-01

## 2022-01-01 RX ORDER — POLYETHYLENE GLYCOL 3350 17 G/17G
17 POWDER, FOR SOLUTION ORAL DAILY PRN
Status: DISCONTINUED | OUTPATIENT
Start: 2022-01-01 | End: 2022-01-01 | Stop reason: HOSPADM

## 2022-01-01 RX ORDER — LIDOCAINE 4 G/G
1 PATCH TOPICAL
Status: DISCONTINUED | OUTPATIENT
Start: 2022-01-01 | End: 2022-01-01 | Stop reason: HOSPADM

## 2022-01-01 RX ORDER — LIDOCAINE HYDROCHLORIDE 20 MG/ML
10 JELLY TOPICAL ONCE
Status: COMPLETED | OUTPATIENT
Start: 2022-01-01 | End: 2022-01-01

## 2022-01-01 RX ORDER — NITROGLYCERIN 0.4 MG/1
TABLET SUBLINGUAL
DISCHARGE
Start: 2022-01-01

## 2022-01-01 RX ORDER — TAMSULOSIN HYDROCHLORIDE 0.4 MG/1
0.4 CAPSULE ORAL DAILY
DISCHARGE
Start: 2022-01-01

## 2022-01-01 RX ORDER — FUROSEMIDE 10 MG/ML
40 INJECTION INTRAMUSCULAR; INTRAVENOUS
Status: DISCONTINUED | OUTPATIENT
Start: 2022-01-01 | End: 2022-01-01

## 2022-01-01 RX ORDER — POTASSIUM CHLORIDE 7.45 MG/ML
10 INJECTION INTRAVENOUS
Status: COMPLETED | OUTPATIENT
Start: 2022-01-01 | End: 2022-01-01

## 2022-01-01 RX ORDER — ACETAMINOPHEN 650 MG/1
650 SUPPOSITORY RECTAL EVERY 6 HOURS PRN
Status: CANCELLED | OUTPATIENT
Start: 2022-01-01

## 2022-01-01 RX ORDER — AMOXICILLIN 250 MG
2 CAPSULE ORAL 2 TIMES DAILY PRN
Status: DISCONTINUED | OUTPATIENT
Start: 2022-01-01 | End: 2022-01-01 | Stop reason: HOSPADM

## 2022-01-01 RX ORDER — FUROSEMIDE 20 MG
20 TABLET ORAL DAILY
Status: ON HOLD | COMMUNITY
Start: 2022-01-01 | End: 2022-01-01

## 2022-01-01 RX ORDER — PROCHLORPERAZINE 25 MG
12.5 SUPPOSITORY, RECTAL RECTAL EVERY 12 HOURS PRN
Status: DISCONTINUED | OUTPATIENT
Start: 2022-01-01 | End: 2022-01-01 | Stop reason: HOSPADM

## 2022-01-01 RX ORDER — SENNOSIDES 8.6 MG
1-2 TABLET ORAL 2 TIMES DAILY PRN
DISCHARGE
Start: 2022-01-01

## 2022-01-01 RX ORDER — HYDROMORPHONE HCL IN WATER/PF 6 MG/30 ML
0.5 PATIENT CONTROLLED ANALGESIA SYRINGE INTRAVENOUS
Status: DISCONTINUED | OUTPATIENT
Start: 2022-01-01 | End: 2022-01-01 | Stop reason: HOSPADM

## 2022-01-01 RX ORDER — AMLODIPINE BESYLATE 5 MG/1
5 TABLET ORAL DAILY
Status: DISCONTINUED | OUTPATIENT
Start: 2022-01-01 | End: 2022-01-01 | Stop reason: HOSPADM

## 2022-01-01 RX ORDER — CARVEDILOL 12.5 MG/1
12.5 TABLET ORAL 2 TIMES DAILY WITH MEALS
Status: DISCONTINUED | OUTPATIENT
Start: 2022-01-01 | End: 2022-01-01 | Stop reason: HOSPADM

## 2022-01-01 RX ORDER — ACETAMINOPHEN 500 MG
1000 TABLET ORAL 3 TIMES DAILY
Status: DISCONTINUED | OUTPATIENT
Start: 2022-01-01 | End: 2022-01-01 | Stop reason: HOSPADM

## 2022-01-01 RX ORDER — ACETAMINOPHEN 325 MG/1
650 TABLET ORAL EVERY 4 HOURS PRN
Status: DISCONTINUED | OUTPATIENT
Start: 2022-01-01 | End: 2022-01-01 | Stop reason: HOSPADM

## 2022-01-01 RX ADMIN — FUROSEMIDE 40 MG: 10 INJECTION, SOLUTION INTRAMUSCULAR; INTRAVENOUS at 09:04

## 2022-01-01 RX ADMIN — SENNOSIDES AND DOCUSATE SODIUM 2 TABLET: 50; 8.6 TABLET ORAL at 22:11

## 2022-01-01 RX ADMIN — FUROSEMIDE 40 MG: 10 INJECTION, SOLUTION INTRAMUSCULAR; INTRAVENOUS at 16:27

## 2022-01-01 RX ADMIN — ACETAMINOPHEN 650 MG: 325 TABLET, FILM COATED ORAL at 10:36

## 2022-01-01 RX ADMIN — CYANOCOBALAMIN TAB 1000 MCG 1000 MCG: 1000 TAB at 09:13

## 2022-01-01 RX ADMIN — CARVEDILOL 12.5 MG: 12.5 TABLET, FILM COATED ORAL at 09:31

## 2022-01-01 RX ADMIN — AMLODIPINE BESYLATE 5 MG: 5 TABLET ORAL at 08:00

## 2022-01-01 RX ADMIN — LORAZEPAM 0.5 MG: 2 INJECTION INTRAMUSCULAR; INTRAVENOUS at 00:18

## 2022-01-01 RX ADMIN — HYDRALAZINE HYDROCHLORIDE 25 MG: 25 TABLET, FILM COATED ORAL at 14:14

## 2022-01-01 RX ADMIN — HYDRALAZINE HYDROCHLORIDE 25 MG: 25 TABLET, FILM COATED ORAL at 21:44

## 2022-01-01 RX ADMIN — ISOSORBIDE MONONITRATE 30 MG: 30 TABLET, EXTENDED RELEASE ORAL at 08:58

## 2022-01-01 RX ADMIN — ACETAMINOPHEN 1000 MG: 500 TABLET, FILM COATED ORAL at 08:01

## 2022-01-01 RX ADMIN — LORAZEPAM 0.5 MG: 2 INJECTION INTRAMUSCULAR; INTRAVENOUS at 03:06

## 2022-01-01 RX ADMIN — ACETAMINOPHEN 650 MG: 650 SUPPOSITORY RECTAL at 17:04

## 2022-01-01 RX ADMIN — FUROSEMIDE 40 MG: 40 TABLET ORAL at 09:12

## 2022-01-01 RX ADMIN — FUROSEMIDE 40 MG: 10 INJECTION, SOLUTION INTRAMUSCULAR; INTRAVENOUS at 15:16

## 2022-01-01 RX ADMIN — HYDRALAZINE HYDROCHLORIDE 25 MG: 25 TABLET, FILM COATED ORAL at 14:17

## 2022-01-01 RX ADMIN — ACETAMINOPHEN 1000 MG: 500 TABLET, FILM COATED ORAL at 15:46

## 2022-01-01 RX ADMIN — CARVEDILOL 12.5 MG: 12.5 TABLET, FILM COATED ORAL at 21:24

## 2022-01-01 RX ADMIN — FUROSEMIDE 40 MG: 40 TABLET ORAL at 15:20

## 2022-01-01 RX ADMIN — ACETAMINOPHEN 650 MG: 325 TABLET, FILM COATED ORAL at 13:50

## 2022-01-01 RX ADMIN — CARVEDILOL 12.5 MG: 12.5 TABLET, FILM COATED ORAL at 18:47

## 2022-01-01 RX ADMIN — ACETAMINOPHEN 1000 MG: 500 TABLET, FILM COATED ORAL at 08:55

## 2022-01-01 RX ADMIN — LORAZEPAM 0.5 MG: 2 INJECTION INTRAMUSCULAR; INTRAVENOUS at 09:55

## 2022-01-01 RX ADMIN — KIT FOR THE PREPARATION OF TECHNETIUM TC 99M ALBUMIN AGGREGATED 4 MCI.: 2.5 INJECTION, POWDER, FOR SOLUTION INTRAVENOUS at 10:34

## 2022-01-01 RX ADMIN — ACETAMINOPHEN 650 MG: 325 TABLET, FILM COATED ORAL at 03:11

## 2022-01-01 RX ADMIN — FUROSEMIDE 40 MG: 10 INJECTION, SOLUTION INTRAVENOUS at 05:50

## 2022-01-01 RX ADMIN — POLYETHYLENE GLYCOL 3350 17 G: 17 POWDER, FOR SOLUTION ORAL at 09:27

## 2022-01-01 RX ADMIN — FUROSEMIDE 40 MG: 40 TABLET ORAL at 15:49

## 2022-01-01 RX ADMIN — MULTIPLE VITAMINS W/ MINERALS TAB 1 TABLET: TAB at 09:12

## 2022-01-01 RX ADMIN — ESCITALOPRAM 5 MG: 5 TABLET, FILM COATED ORAL at 17:44

## 2022-01-01 RX ADMIN — HYDROMORPHONE HYDROCHLORIDE 0.2 MG: 0.2 INJECTION, SOLUTION INTRAMUSCULAR; INTRAVENOUS; SUBCUTANEOUS at 19:24

## 2022-01-01 RX ADMIN — ATROPA BELLADONNA AND OPIUM 1 SUPPOSITORY: 16.2; 6 SUPPOSITORY RECTAL at 18:26

## 2022-01-01 RX ADMIN — CARVEDILOL 12.5 MG: 12.5 TABLET, FILM COATED ORAL at 07:59

## 2022-01-01 RX ADMIN — AMLODIPINE BESYLATE 2.5 MG: 2.5 TABLET ORAL at 10:39

## 2022-01-01 RX ADMIN — ESCITALOPRAM 5 MG: 5 TABLET, FILM COATED ORAL at 08:09

## 2022-01-01 RX ADMIN — MULTIPLE VITAMINS W/ MINERALS TAB 1 TABLET: TAB at 08:09

## 2022-01-01 RX ADMIN — FUROSEMIDE 40 MG: 40 TABLET ORAL at 09:13

## 2022-01-01 RX ADMIN — FUROSEMIDE 40 MG: 10 INJECTION, SOLUTION INTRAVENOUS at 05:47

## 2022-01-01 RX ADMIN — CARVEDILOL 12.5 MG: 12.5 TABLET, FILM COATED ORAL at 09:52

## 2022-01-01 RX ADMIN — CARVEDILOL 12.5 MG: 12.5 TABLET, FILM COATED ORAL at 11:43

## 2022-01-01 RX ADMIN — ISOSORBIDE MONONITRATE 30 MG: 30 TABLET, EXTENDED RELEASE ORAL at 16:38

## 2022-01-01 RX ADMIN — ISOSORBIDE MONONITRATE 30 MG: 30 TABLET, EXTENDED RELEASE ORAL at 16:34

## 2022-01-01 RX ADMIN — Medication 1 MG: at 02:44

## 2022-01-01 RX ADMIN — FUROSEMIDE 40 MG: 10 INJECTION, SOLUTION INTRAVENOUS at 18:42

## 2022-01-01 RX ADMIN — LORAZEPAM 0.5 MG: 2 INJECTION INTRAMUSCULAR; INTRAVENOUS at 21:23

## 2022-01-01 RX ADMIN — FUROSEMIDE 40 MG: 10 INJECTION, SOLUTION INTRAMUSCULAR; INTRAVENOUS at 08:20

## 2022-01-01 RX ADMIN — TAMSULOSIN HYDROCHLORIDE 0.4 MG: 0.4 CAPSULE ORAL at 09:12

## 2022-01-01 RX ADMIN — FUROSEMIDE 40 MG: 10 INJECTION, SOLUTION INTRAVENOUS at 18:08

## 2022-01-01 RX ADMIN — HYDRALAZINE HYDROCHLORIDE 25 MG: 25 TABLET, FILM COATED ORAL at 21:06

## 2022-01-01 RX ADMIN — ACETAMINOPHEN 1000 MG: 500 TABLET, FILM COATED ORAL at 21:24

## 2022-01-01 RX ADMIN — HYDROMORPHONE HYDROCHLORIDE 0.2 MG: 0.2 INJECTION, SOLUTION INTRAMUSCULAR; INTRAVENOUS; SUBCUTANEOUS at 05:12

## 2022-01-01 RX ADMIN — TAMSULOSIN HYDROCHLORIDE 0.4 MG: 0.4 CAPSULE ORAL at 16:34

## 2022-01-01 RX ADMIN — HYDRALAZINE HYDROCHLORIDE 25 MG: 25 TABLET, FILM COATED ORAL at 14:45

## 2022-01-01 RX ADMIN — AMLODIPINE BESYLATE 5 MG: 5 TABLET ORAL at 08:02

## 2022-01-01 RX ADMIN — HYDRALAZINE HYDROCHLORIDE 25 MG: 25 TABLET ORAL at 09:12

## 2022-01-01 RX ADMIN — FUROSEMIDE 40 MG: 40 TABLET ORAL at 15:30

## 2022-01-01 RX ADMIN — HYDRALAZINE HYDROCHLORIDE 25 MG: 25 TABLET, FILM COATED ORAL at 06:47

## 2022-01-01 RX ADMIN — ACETAMINOPHEN 1000 MG: 500 TABLET, FILM COATED ORAL at 21:05

## 2022-01-01 RX ADMIN — TAMSULOSIN HYDROCHLORIDE 0.4 MG: 0.4 CAPSULE ORAL at 08:09

## 2022-01-01 RX ADMIN — FUROSEMIDE 60 MG: 10 INJECTION, SOLUTION INTRAVENOUS at 06:12

## 2022-01-01 RX ADMIN — FUROSEMIDE 40 MG: 40 TABLET ORAL at 08:10

## 2022-01-01 RX ADMIN — TAMSULOSIN HYDROCHLORIDE 0.4 MG: 0.4 CAPSULE ORAL at 08:00

## 2022-01-01 RX ADMIN — CARVEDILOL 12.5 MG: 12.5 TABLET, FILM COATED ORAL at 08:02

## 2022-01-01 RX ADMIN — HYDRALAZINE HYDROCHLORIDE 25 MG: 25 TABLET ORAL at 09:52

## 2022-01-01 RX ADMIN — ACETAMINOPHEN 1000 MG: 500 TABLET, FILM COATED ORAL at 22:06

## 2022-01-01 RX ADMIN — TAMSULOSIN HYDROCHLORIDE 0.4 MG: 0.4 CAPSULE ORAL at 09:57

## 2022-01-01 RX ADMIN — AMLODIPINE BESYLATE 5 MG: 5 TABLET ORAL at 09:51

## 2022-01-01 RX ADMIN — ISOSORBIDE MONONITRATE 30 MG: 30 TABLET, EXTENDED RELEASE ORAL at 09:31

## 2022-01-01 RX ADMIN — HYDRALAZINE HYDROCHLORIDE 25 MG: 25 TABLET ORAL at 23:50

## 2022-01-01 RX ADMIN — AMLODIPINE BESYLATE 5 MG: 5 TABLET ORAL at 08:10

## 2022-01-01 RX ADMIN — ACETAMINOPHEN 650 MG: 325 TABLET, FILM COATED ORAL at 16:33

## 2022-01-01 RX ADMIN — HYDRALAZINE HYDROCHLORIDE 25 MG: 25 TABLET ORAL at 08:10

## 2022-01-01 RX ADMIN — ACETAMINOPHEN 1000 MG: 500 TABLET, FILM COATED ORAL at 21:44

## 2022-01-01 RX ADMIN — HYDRALAZINE HYDROCHLORIDE 25 MG: 25 TABLET ORAL at 23:46

## 2022-01-01 RX ADMIN — SIMETHICONE 80 MG: 80 TABLET, CHEWABLE ORAL at 00:32

## 2022-01-01 RX ADMIN — ISOSORBIDE MONONITRATE 30 MG: 30 TABLET, EXTENDED RELEASE ORAL at 09:52

## 2022-01-01 RX ADMIN — CYANOCOBALAMIN TAB 1000 MCG 1000 MCG: 1000 TAB at 08:10

## 2022-01-01 RX ADMIN — OLANZAPINE 5 MG: 5 TABLET, ORALLY DISINTEGRATING ORAL at 23:42

## 2022-01-01 RX ADMIN — HYDRALAZINE HYDROCHLORIDE 25 MG: 25 TABLET ORAL at 23:54

## 2022-01-01 RX ADMIN — ESCITALOPRAM 5 MG: 5 TABLET, FILM COATED ORAL at 08:10

## 2022-01-01 RX ADMIN — OLANZAPINE 5 MG: 5 TABLET, ORALLY DISINTEGRATING ORAL at 23:50

## 2022-01-01 RX ADMIN — HYDRALAZINE HYDROCHLORIDE 25 MG: 25 TABLET ORAL at 23:33

## 2022-01-01 RX ADMIN — ACETAMINOPHEN 650 MG: 325 TABLET, FILM COATED ORAL at 13:02

## 2022-01-01 RX ADMIN — ATROPA BELLADONNA AND OPIUM 1 SUPPOSITORY: 16.2; 6 SUPPOSITORY RECTAL at 00:11

## 2022-01-01 RX ADMIN — FUROSEMIDE 40 MG: 40 TABLET ORAL at 08:09

## 2022-01-01 RX ADMIN — ESCITALOPRAM 5 MG: 5 TABLET, FILM COATED ORAL at 09:26

## 2022-01-01 RX ADMIN — FUROSEMIDE 40 MG: 10 INJECTION, SOLUTION INTRAMUSCULAR; INTRAVENOUS at 09:03

## 2022-01-01 RX ADMIN — TAMSULOSIN HYDROCHLORIDE 0.4 MG: 0.4 CAPSULE ORAL at 08:10

## 2022-01-01 RX ADMIN — ISOSORBIDE MONONITRATE 30 MG: 30 TABLET, EXTENDED RELEASE ORAL at 09:12

## 2022-01-01 RX ADMIN — ATROPA BELLADONNA AND OPIUM 1 SUPPOSITORY: 16.2; 6 SUPPOSITORY RECTAL at 23:55

## 2022-01-01 RX ADMIN — POTASSIUM CHLORIDE 20 MEQ: 1500 TABLET, EXTENDED RELEASE ORAL at 11:28

## 2022-01-01 RX ADMIN — ISOSORBIDE MONONITRATE 30 MG: 30 TABLET, EXTENDED RELEASE ORAL at 08:19

## 2022-01-01 RX ADMIN — CYANOCOBALAMIN TAB 1000 MCG 1000 MCG: 1000 TAB at 08:19

## 2022-01-01 RX ADMIN — ACETAMINOPHEN 650 MG: 325 TABLET, FILM COATED ORAL at 23:59

## 2022-01-01 RX ADMIN — ACETAMINOPHEN 650 MG: 325 TABLET, FILM COATED ORAL at 18:22

## 2022-01-01 RX ADMIN — POLYETHYLENE GLYCOL 3350 17 G: 17 POWDER, FOR SOLUTION ORAL at 08:09

## 2022-01-01 RX ADMIN — TAMSULOSIN HYDROCHLORIDE 0.4 MG: 0.4 CAPSULE ORAL at 09:16

## 2022-01-01 RX ADMIN — POTASSIUM CHLORIDE 40 MEQ: 1.5 POWDER, FOR SOLUTION ORAL at 09:27

## 2022-01-01 RX ADMIN — CARVEDILOL 6.25 MG: 6.25 TABLET, FILM COATED ORAL at 12:03

## 2022-01-01 RX ADMIN — POTASSIUM CHLORIDE 10 MEQ: 7.46 INJECTION, SOLUTION INTRAVENOUS at 09:38

## 2022-01-01 RX ADMIN — CARVEDILOL 12.5 MG: 12.5 TABLET, FILM COATED ORAL at 21:44

## 2022-01-01 RX ADMIN — ISOSORBIDE MONONITRATE 30 MG: 30 TABLET, EXTENDED RELEASE ORAL at 08:01

## 2022-01-01 RX ADMIN — CARVEDILOL 12.5 MG: 12.5 TABLET, FILM COATED ORAL at 18:11

## 2022-01-01 RX ADMIN — HYDRALAZINE HYDROCHLORIDE 25 MG: 25 TABLET ORAL at 00:03

## 2022-01-01 RX ADMIN — HYDRALAZINE HYDROCHLORIDE 25 MG: 25 TABLET, FILM COATED ORAL at 06:05

## 2022-01-01 RX ADMIN — HEPARIN SODIUM 5000 UNITS: 5000 INJECTION, SOLUTION INTRAVENOUS; SUBCUTANEOUS at 19:51

## 2022-01-01 RX ADMIN — ACETAMINOPHEN 1000 MG: 500 TABLET, FILM COATED ORAL at 15:16

## 2022-01-01 RX ADMIN — HYDRALAZINE HYDROCHLORIDE 25 MG: 25 TABLET, FILM COATED ORAL at 21:24

## 2022-01-01 RX ADMIN — ISOSORBIDE MONONITRATE 30 MG: 30 TABLET, EXTENDED RELEASE ORAL at 08:10

## 2022-01-01 RX ADMIN — ESCITALOPRAM 5 MG: 5 TABLET, FILM COATED ORAL at 09:52

## 2022-01-01 RX ADMIN — CYANOCOBALAMIN TAB 1000 MCG 1000 MCG: 1000 TAB at 09:25

## 2022-01-01 RX ADMIN — ACETAMINOPHEN 650 MG: 325 TABLET, FILM COATED ORAL at 20:58

## 2022-01-01 RX ADMIN — FUROSEMIDE 40 MG: 10 INJECTION, SOLUTION INTRAMUSCULAR; INTRAVENOUS at 09:31

## 2022-01-01 RX ADMIN — ISOSORBIDE MONONITRATE 30 MG: 30 TABLET, EXTENDED RELEASE ORAL at 07:59

## 2022-01-01 RX ADMIN — ESCITALOPRAM 5 MG: 5 TABLET, FILM COATED ORAL at 09:13

## 2022-01-01 RX ADMIN — FUROSEMIDE 40 MG: 10 INJECTION, SOLUTION INTRAVENOUS at 23:47

## 2022-01-01 RX ADMIN — HYDRALAZINE HYDROCHLORIDE 25 MG: 25 TABLET ORAL at 15:50

## 2022-01-01 RX ADMIN — HYDRALAZINE HYDROCHLORIDE 25 MG: 25 TABLET ORAL at 08:00

## 2022-01-01 RX ADMIN — ACETAMINOPHEN 650 MG: 325 TABLET, FILM COATED ORAL at 13:26

## 2022-01-01 RX ADMIN — TAMSULOSIN HYDROCHLORIDE 0.4 MG: 0.4 CAPSULE ORAL at 08:02

## 2022-01-01 RX ADMIN — HYDRALAZINE HYDROCHLORIDE 25 MG: 25 TABLET, FILM COATED ORAL at 05:40

## 2022-01-01 RX ADMIN — HYDROMORPHONE HYDROCHLORIDE 0.2 MG: 0.2 INJECTION, SOLUTION INTRAMUSCULAR; INTRAVENOUS; SUBCUTANEOUS at 01:14

## 2022-01-01 RX ADMIN — CYANOCOBALAMIN TAB 1000 MCG 1000 MCG: 1000 TAB at 09:02

## 2022-01-01 RX ADMIN — ESCITALOPRAM 5 MG: 5 TABLET, FILM COATED ORAL at 08:20

## 2022-01-01 RX ADMIN — CYANOCOBALAMIN TAB 1000 MCG 1000 MCG: 1000 TAB at 11:44

## 2022-01-01 RX ADMIN — TAMSULOSIN HYDROCHLORIDE 0.4 MG: 0.4 CAPSULE ORAL at 08:19

## 2022-01-01 RX ADMIN — Medication 1 MG: at 21:05

## 2022-01-01 RX ADMIN — AMLODIPINE BESYLATE 5 MG: 5 TABLET ORAL at 08:20

## 2022-01-01 RX ADMIN — OLANZAPINE 5 MG: 5 TABLET, ORALLY DISINTEGRATING ORAL at 19:25

## 2022-01-01 RX ADMIN — ESCITALOPRAM 5 MG: 5 TABLET, FILM COATED ORAL at 08:00

## 2022-01-01 RX ADMIN — ISOSORBIDE MONONITRATE 30 MG: 30 TABLET, EXTENDED RELEASE ORAL at 09:51

## 2022-01-01 RX ADMIN — HYDRALAZINE HYDROCHLORIDE 25 MG: 25 TABLET ORAL at 15:30

## 2022-01-01 RX ADMIN — HYDRALAZINE HYDROCHLORIDE 25 MG: 25 TABLET ORAL at 08:09

## 2022-01-01 RX ADMIN — TAMSULOSIN HYDROCHLORIDE 0.4 MG: 0.4 CAPSULE ORAL at 09:25

## 2022-01-01 RX ADMIN — ACETAMINOPHEN 1000 MG: 500 TABLET, FILM COATED ORAL at 16:38

## 2022-01-01 RX ADMIN — SENNOSIDES AND DOCUSATE SODIUM 2 TABLET: 50; 8.6 TABLET ORAL at 10:25

## 2022-01-01 RX ADMIN — FUROSEMIDE 40 MG: 40 TABLET ORAL at 08:02

## 2022-01-01 RX ADMIN — TAMSULOSIN HYDROCHLORIDE 0.4 MG: 0.4 CAPSULE ORAL at 09:31

## 2022-01-01 RX ADMIN — ESCITALOPRAM 5 MG: 5 TABLET, FILM COATED ORAL at 09:12

## 2022-01-01 RX ADMIN — MULTIPLE VITAMINS W/ MINERALS TAB 1 TABLET: TAB at 09:25

## 2022-01-01 RX ADMIN — LORAZEPAM 0.5 MG: 2 INJECTION INTRAMUSCULAR; INTRAVENOUS at 05:19

## 2022-01-01 RX ADMIN — MULTIPLE VITAMINS W/ MINERALS TAB 1 TABLET: TAB at 08:10

## 2022-01-01 RX ADMIN — AMLODIPINE BESYLATE 5 MG: 5 TABLET ORAL at 09:31

## 2022-01-01 RX ADMIN — POLYETHYLENE GLYCOL 3350 17 G: 17 POWDER, FOR SOLUTION ORAL at 10:25

## 2022-01-01 RX ADMIN — OLANZAPINE 5 MG: 5 TABLET, ORALLY DISINTEGRATING ORAL at 23:34

## 2022-01-01 RX ADMIN — LIDOCAINE 1 PATCH: 560 PATCH PERCUTANEOUS; TOPICAL; TRANSDERMAL at 19:12

## 2022-01-01 RX ADMIN — FUROSEMIDE 40 MG: 10 INJECTION, SOLUTION INTRAMUSCULAR; INTRAVENOUS at 15:52

## 2022-01-01 RX ADMIN — LIDOCAINE HYDROCHLORIDE 6 ML: 20 JELLY TOPICAL at 21:23

## 2022-01-01 RX ADMIN — HYDRALAZINE HYDROCHLORIDE 25 MG: 25 TABLET, FILM COATED ORAL at 05:28

## 2022-01-01 RX ADMIN — SENNOSIDES AND DOCUSATE SODIUM 2 TABLET: 50; 8.6 TABLET ORAL at 08:10

## 2022-01-01 RX ADMIN — ESCITALOPRAM 5 MG: 5 TABLET, FILM COATED ORAL at 16:34

## 2022-01-01 RX ADMIN — CYANOCOBALAMIN TAB 1000 MCG 1000 MCG: 1000 TAB at 08:02

## 2022-01-01 RX ADMIN — HYDRALAZINE HYDROCHLORIDE 25 MG: 25 TABLET ORAL at 15:39

## 2022-01-01 RX ADMIN — ACETAMINOPHEN 1000 MG: 500 TABLET, FILM COATED ORAL at 09:31

## 2022-01-01 RX ADMIN — ACETAMINOPHEN 650 MG: 325 TABLET, FILM COATED ORAL at 23:09

## 2022-01-01 RX ADMIN — ACETAMINOPHEN 1000 MG: 500 TABLET, FILM COATED ORAL at 11:42

## 2022-01-01 RX ADMIN — MULTIPLE VITAMINS W/ MINERALS TAB 1 TABLET: TAB at 15:39

## 2022-01-01 RX ADMIN — LORAZEPAM 0.5 MG: 2 INJECTION INTRAMUSCULAR; INTRAVENOUS at 23:23

## 2022-01-01 RX ADMIN — CYANOCOBALAMIN TAB 1000 MCG 1000 MCG: 1000 TAB at 08:00

## 2022-01-01 RX ADMIN — ACETAMINOPHEN 650 MG: 325 TABLET, FILM COATED ORAL at 04:19

## 2022-01-01 RX ADMIN — CYANOCOBALAMIN TAB 1000 MCG 1000 MCG: 1000 TAB at 08:09

## 2022-01-01 RX ADMIN — CYANOCOBALAMIN TAB 1000 MCG 1000 MCG: 1000 TAB at 09:45

## 2022-01-01 RX ADMIN — CYANOCOBALAMIN TAB 1000 MCG 1000 MCG: 1000 TAB at 09:31

## 2022-01-01 RX ADMIN — OLANZAPINE 5 MG: 5 TABLET, ORALLY DISINTEGRATING ORAL at 02:20

## 2022-01-01 RX ADMIN — MULTIPLE VITAMINS W/ MINERALS TAB 1 TABLET: TAB at 08:00

## 2022-01-01 RX ADMIN — AMLODIPINE BESYLATE 10 MG: 10 TABLET ORAL at 11:43

## 2022-01-01 RX ADMIN — MULTIPLE VITAMINS W/ MINERALS TAB 1 TABLET: TAB at 09:45

## 2022-01-01 RX ADMIN — ACETAMINOPHEN 1000 MG: 500 TABLET, FILM COATED ORAL at 16:28

## 2022-01-01 RX ADMIN — HALOPERIDOL LACTATE 2 MG: 5 INJECTION, SOLUTION INTRAMUSCULAR at 22:37

## 2022-01-01 RX ADMIN — BISACODYL 10 MG: 10 SUPPOSITORY RECTAL at 16:45

## 2022-01-01 RX ADMIN — ISOSORBIDE MONONITRATE 30 MG: 30 TABLET, EXTENDED RELEASE ORAL at 08:09

## 2022-01-01 RX ADMIN — CARVEDILOL 12.5 MG: 12.5 TABLET, FILM COATED ORAL at 08:20

## 2022-01-01 RX ADMIN — OLANZAPINE 5 MG: 5 TABLET, ORALLY DISINTEGRATING ORAL at 23:45

## 2022-01-01 RX ADMIN — HEPARIN SODIUM 5000 UNITS: 5000 INJECTION, SOLUTION INTRAVENOUS; SUBCUTANEOUS at 08:02

## 2022-01-01 RX ADMIN — AMLODIPINE BESYLATE 5 MG: 5 TABLET ORAL at 08:09

## 2022-01-01 RX ADMIN — HYDRALAZINE HYDROCHLORIDE 25 MG: 25 TABLET ORAL at 15:49

## 2022-01-01 RX ADMIN — ISOSORBIDE MONONITRATE 30 MG: 30 TABLET, EXTENDED RELEASE ORAL at 09:25

## 2022-01-01 RX ADMIN — FUROSEMIDE 40 MG: 40 TABLET ORAL at 09:52

## 2022-01-01 RX ADMIN — ESCITALOPRAM 5 MG: 5 TABLET, FILM COATED ORAL at 08:01

## 2022-01-01 RX ADMIN — ESCITALOPRAM 5 MG: 5 TABLET, FILM COATED ORAL at 09:31

## 2022-01-01 RX ADMIN — Medication 1 MG: at 20:32

## 2022-01-01 RX ADMIN — Medication 1 MG: at 00:51

## 2022-01-01 RX ADMIN — TAMSULOSIN HYDROCHLORIDE 0.4 MG: 0.4 CAPSULE ORAL at 09:45

## 2022-01-01 RX ADMIN — ACETAMINOPHEN 650 MG: 325 TABLET, FILM COATED ORAL at 23:46

## 2022-01-01 RX ADMIN — FUROSEMIDE 40 MG: 10 INJECTION, SOLUTION INTRAVENOUS at 18:51

## 2022-01-01 RX ADMIN — HYDRALAZINE HYDROCHLORIDE 25 MG: 25 TABLET ORAL at 00:23

## 2022-01-01 RX ADMIN — ESCITALOPRAM 5 MG: 5 TABLET, FILM COATED ORAL at 11:05

## 2022-01-01 RX ADMIN — OLANZAPINE 5 MG: 5 TABLET, ORALLY DISINTEGRATING ORAL at 11:52

## 2022-01-01 RX ADMIN — POTASSIUM CHLORIDE AND SODIUM CHLORIDE: 900; 150 INJECTION, SOLUTION INTRAVENOUS at 16:00

## 2022-01-01 RX ADMIN — LIDOCAINE HYDROCHLORIDE: 20 JELLY TOPICAL at 17:18

## 2022-01-01 RX ADMIN — LIDOCAINE 1 PATCH: 560 PATCH PERCUTANEOUS; TOPICAL; TRANSDERMAL at 00:10

## 2022-01-01 RX ADMIN — HYDRALAZINE HYDROCHLORIDE 25 MG: 25 TABLET, FILM COATED ORAL at 22:05

## 2022-01-01 RX ADMIN — ACETAMINOPHEN 1000 MG: 500 TABLET, FILM COATED ORAL at 08:19

## 2022-01-01 RX ADMIN — HYDRALAZINE HYDROCHLORIDE 25 MG: 25 TABLET ORAL at 00:11

## 2022-01-01 RX ADMIN — POTASSIUM CHLORIDE 10 MEQ: 7.46 INJECTION, SOLUTION INTRAVENOUS at 09:24

## 2022-01-01 RX ADMIN — TAMSULOSIN HYDROCHLORIDE 0.4 MG: 0.4 CAPSULE ORAL at 09:13

## 2022-01-01 RX ADMIN — CYANOCOBALAMIN TAB 1000 MCG 1000 MCG: 1000 TAB at 09:52

## 2022-01-01 RX ADMIN — ESCITALOPRAM 5 MG: 5 TABLET, FILM COATED ORAL at 09:00

## 2022-01-01 RX ADMIN — CARVEDILOL 12.5 MG: 12.5 TABLET, FILM COATED ORAL at 08:09

## 2022-01-01 RX ADMIN — CARVEDILOL 12.5 MG: 12.5 TABLET, FILM COATED ORAL at 21:06

## 2022-01-01 RX ADMIN — POLYETHYLENE GLYCOL 3350 17 G: 17 POWDER, FOR SOLUTION ORAL at 08:20

## 2022-01-01 RX ADMIN — CARVEDILOL 12.5 MG: 12.5 TABLET, FILM COATED ORAL at 09:00

## 2022-01-01 RX ADMIN — POTASSIUM CHLORIDE 40 MEQ: 1500 TABLET, EXTENDED RELEASE ORAL at 08:20

## 2022-01-01 RX ADMIN — ESCITALOPRAM 5 MG: 5 TABLET, FILM COATED ORAL at 09:45

## 2022-01-01 RX ADMIN — HYDRALAZINE HYDROCHLORIDE 25 MG: 25 TABLET ORAL at 15:20

## 2022-01-01 RX ADMIN — OLANZAPINE 5 MG: 5 TABLET, ORALLY DISINTEGRATING ORAL at 01:03

## 2022-01-01 RX ADMIN — ACETAMINOPHEN 650 MG: 325 TABLET, FILM COATED ORAL at 08:10

## 2022-01-01 RX ADMIN — FUROSEMIDE 40 MG: 40 TABLET ORAL at 15:50

## 2022-01-01 RX ADMIN — AMLODIPINE BESYLATE 5 MG: 5 TABLET ORAL at 09:02

## 2022-01-01 RX ADMIN — POTASSIUM CHLORIDE 20 MEQ: 20 SOLUTION ORAL at 16:23

## 2022-01-01 RX ADMIN — LORAZEPAM 0.5 MG: 2 INJECTION INTRAMUSCULAR; INTRAVENOUS at 02:20

## 2022-01-01 RX ADMIN — POLYETHYLENE GLYCOL 3350 17 G: 17 POWDER, FOR SOLUTION ORAL at 08:01

## 2022-01-01 RX ADMIN — SODIUM CHLORIDE, POTASSIUM CHLORIDE, SODIUM LACTATE AND CALCIUM CHLORIDE 1000 ML: 600; 310; 30; 20 INJECTION, SOLUTION INTRAVENOUS at 09:20

## 2022-01-01 ASSESSMENT — ACTIVITIES OF DAILY LIVING (ADL)
ADLS_ACUITY_SCORE: 24
ADLS_ACUITY_SCORE: 24
ADLS_ACUITY_SCORE: 23
ADLS_ACUITY_SCORE: 17
ADLS_ACUITY_SCORE: 24
CONCENTRATING,_REMEMBERING_OR_MAKING_DECISIONS_DIFFICULTY: YES
ADLS_ACUITY_SCORE: 23
ADLS_ACUITY_SCORE: 16
ADLS_ACUITY_SCORE: 14
ADLS_ACUITY_SCORE: 26
ADLS_ACUITY_SCORE: 26
ADLS_ACUITY_SCORE: 22
ADLS_ACUITY_SCORE: 26
ADLS_ACUITY_SCORE: 16
ADLS_ACUITY_SCORE: 17
ADLS_ACUITY_SCORE: 14
ADLS_ACUITY_SCORE: 26
ADLS_ACUITY_SCORE: 24
ADLS_ACUITY_SCORE: 16
ADLS_ACUITY_SCORE: 26
ADLS_ACUITY_SCORE: 25
ADLS_ACUITY_SCORE: 26
ADLS_ACUITY_SCORE: 26
ADLS_ACUITY_SCORE: 16
ADLS_ACUITY_SCORE: 24
ADLS_ACUITY_SCORE: 24
ADLS_ACUITY_SCORE: 26
ADLS_ACUITY_SCORE: 16
ADLS_ACUITY_SCORE: 14
ADLS_ACUITY_SCORE: 26
ADLS_ACUITY_SCORE: 16
ADLS_ACUITY_SCORE: 23
ADLS_ACUITY_SCORE: 24
ADLS_ACUITY_SCORE: 19
ADLS_ACUITY_SCORE: 25
ADLS_ACUITY_SCORE: 26
ADLS_ACUITY_SCORE: 16
ADLS_ACUITY_SCORE: 23
ADLS_ACUITY_SCORE: 16
ADLS_ACUITY_SCORE: 24
ADLS_ACUITY_SCORE: 25
ADLS_ACUITY_SCORE: 26
ADLS_ACUITY_SCORE: 26
DRESSING/BATHING_DIFFICULTY: YES
ADLS_ACUITY_SCORE: 16
ADLS_ACUITY_SCORE: 24
ADLS_ACUITY_SCORE: 25
ADLS_ACUITY_SCORE: 25
ADLS_ACUITY_SCORE: 26
ADLS_ACUITY_SCORE: 17
ADLS_ACUITY_SCORE: 16
ADLS_ACUITY_SCORE: 14
ADLS_ACUITY_SCORE: 24
ADLS_ACUITY_SCORE: 22
ADLS_ACUITY_SCORE: 19
ADLS_ACUITY_SCORE: 16
ADLS_ACUITY_SCORE: 23
ADLS_ACUITY_SCORE: 16
ADLS_ACUITY_SCORE: 17
ADLS_ACUITY_SCORE: 23
ADLS_ACUITY_SCORE: 14
ADLS_ACUITY_SCORE: 22
ADLS_ACUITY_SCORE: 25
ADLS_ACUITY_SCORE: 14
ADLS_ACUITY_SCORE: 24
ADLS_ACUITY_SCORE: 22
ADLS_ACUITY_SCORE: 26
ADLS_ACUITY_SCORE: 26
ADLS_ACUITY_SCORE: 17
ADLS_ACUITY_SCORE: 24
ADLS_ACUITY_SCORE: 16
ADLS_ACUITY_SCORE: 26
ADLS_ACUITY_SCORE: 23
ADLS_ACUITY_SCORE: 14
ADLS_ACUITY_SCORE: 26
ADLS_ACUITY_SCORE: 24
ADLS_ACUITY_SCORE: 16
ADLS_ACUITY_SCORE: 24
ADLS_ACUITY_SCORE: 24
DOING_ERRANDS_INDEPENDENTLY_DIFFICULTY: YES
ADLS_ACUITY_SCORE: 14
ADLS_ACUITY_SCORE: 22
ADLS_ACUITY_SCORE: 24
ADLS_ACUITY_SCORE: 16
ADLS_ACUITY_SCORE: 22
ADLS_ACUITY_SCORE: 17
ADLS_ACUITY_SCORE: 22
ADLS_ACUITY_SCORE: 24
ADLS_ACUITY_SCORE: 17
ADLS_ACUITY_SCORE: 16
ADLS_ACUITY_SCORE: 22
CHANGE_IN_FUNCTIONAL_STATUS_SINCE_ONSET_OF_CURRENT_ILLNESS/INJURY: NO
ADLS_ACUITY_SCORE: 24
ADLS_ACUITY_SCORE: 23
ADLS_ACUITY_SCORE: 17
ADLS_ACUITY_SCORE: 16
ADLS_ACUITY_SCORE: 16
ADLS_ACUITY_SCORE: 17
ADLS_ACUITY_SCORE: 16
ADLS_ACUITY_SCORE: 17
ADLS_ACUITY_SCORE: 16
ADLS_ACUITY_SCORE: 26
ADLS_ACUITY_SCORE: 16
WALKING_OR_CLIMBING_STAIRS_DIFFICULTY: YES
ADLS_ACUITY_SCORE: 26
ADLS_ACUITY_SCORE: 23
ADLS_ACUITY_SCORE: 22
ADLS_ACUITY_SCORE: 24
ADLS_ACUITY_SCORE: 16
ADLS_ACUITY_SCORE: 16
ADLS_ACUITY_SCORE: 22
ADLS_ACUITY_SCORE: 26
ADLS_ACUITY_SCORE: 17
ADLS_ACUITY_SCORE: 25
ADLS_ACUITY_SCORE: 23
ADLS_ACUITY_SCORE: 26
ADLS_ACUITY_SCORE: 22
DIFFICULTY_EATING/SWALLOWING: NO
ADLS_ACUITY_SCORE: 16
ADLS_ACUITY_SCORE: 22
ADLS_ACUITY_SCORE: 16
ADLS_ACUITY_SCORE: 23
ADLS_ACUITY_SCORE: 24
ADLS_ACUITY_SCORE: 25
ADLS_ACUITY_SCORE: 24
ADLS_ACUITY_SCORE: 24
ADLS_ACUITY_SCORE: 26
ADLS_ACUITY_SCORE: 17
ADLS_ACUITY_SCORE: 22
ADLS_ACUITY_SCORE: 14
ADLS_ACUITY_SCORE: 10
ADLS_ACUITY_SCORE: 16
ADLS_ACUITY_SCORE: 23
ADLS_ACUITY_SCORE: 17
ADLS_ACUITY_SCORE: 16
ADLS_ACUITY_SCORE: 24
ADLS_ACUITY_SCORE: 25
ADLS_ACUITY_SCORE: 23
ADLS_ACUITY_SCORE: 23
ADLS_ACUITY_SCORE: 22
ADLS_ACUITY_SCORE: 26
ADLS_ACUITY_SCORE: 26
ADLS_ACUITY_SCORE: 16
ADLS_ACUITY_SCORE: 24
ADLS_ACUITY_SCORE: 22
ADLS_ACUITY_SCORE: 16
FALL_HISTORY_WITHIN_LAST_SIX_MONTHS: YES
ADLS_ACUITY_SCORE: 14
ADLS_ACUITY_SCORE: 25
ADLS_ACUITY_SCORE: 25
ADLS_ACUITY_SCORE: 22
ADLS_ACUITY_SCORE: 24
ADLS_ACUITY_SCORE: 22
TOILETING_ISSUES: YES
ADLS_ACUITY_SCORE: 16
ADLS_ACUITY_SCORE: 23
ADLS_ACUITY_SCORE: 10
ADLS_ACUITY_SCORE: 16
ADLS_ACUITY_SCORE: 25
ADLS_ACUITY_SCORE: 24
ADLS_ACUITY_SCORE: 14
ADLS_ACUITY_SCORE: 23
ADLS_ACUITY_SCORE: 16
ADLS_ACUITY_SCORE: 23
ADLS_ACUITY_SCORE: 26
ADLS_ACUITY_SCORE: 24
ADLS_ACUITY_SCORE: 10
ADLS_ACUITY_SCORE: 23
ADLS_ACUITY_SCORE: 12
ADLS_ACUITY_SCORE: 23
DRESSING/BATHING: BATHING DIFFICULTY, ASSISTANCE 1 PERSON;DRESSING DIFFICULTY, ASSISTANCE 1 PERSON
ADLS_ACUITY_SCORE: 22
ADLS_ACUITY_SCORE: 24
ADLS_ACUITY_SCORE: 23
ADLS_ACUITY_SCORE: 24
ADLS_ACUITY_SCORE: 16
ADLS_ACUITY_SCORE: 26
ADLS_ACUITY_SCORE: 16
ADLS_ACUITY_SCORE: 16
ADLS_ACUITY_SCORE: 26
ADLS_ACUITY_SCORE: 16
ADLS_ACUITY_SCORE: 24
ADLS_ACUITY_SCORE: 16
ADLS_ACUITY_SCORE: 22
ADLS_ACUITY_SCORE: 14
ADLS_ACUITY_SCORE: 26
VISION_MANAGEMENT: GLASSES
ADLS_ACUITY_SCORE: 24
ADLS_ACUITY_SCORE: 16
ADLS_ACUITY_SCORE: 24
ADLS_ACUITY_SCORE: 26
ADLS_ACUITY_SCORE: 26
ADLS_ACUITY_SCORE: 16
ADLS_ACUITY_SCORE: 16
ADLS_ACUITY_SCORE: 26
ADLS_ACUITY_SCORE: 16
ADLS_ACUITY_SCORE: 26
ADLS_ACUITY_SCORE: 24
ADLS_ACUITY_SCORE: 17
ADLS_ACUITY_SCORE: 16
ADLS_ACUITY_SCORE: 26
ADLS_ACUITY_SCORE: 23
ADLS_ACUITY_SCORE: 22
ADLS_ACUITY_SCORE: 16
ADLS_ACUITY_SCORE: 17
ADLS_ACUITY_SCORE: 26
ADLS_ACUITY_SCORE: 24
ADLS_ACUITY_SCORE: 25
ADLS_ACUITY_SCORE: 14
ADLS_ACUITY_SCORE: 24
ADLS_ACUITY_SCORE: 24
ADLS_ACUITY_SCORE: 16
ADLS_ACUITY_SCORE: 16
ADLS_ACUITY_SCORE: 26
ADLS_ACUITY_SCORE: 14
ADLS_ACUITY_SCORE: 17
ADLS_ACUITY_SCORE: 22
ADLS_ACUITY_SCORE: 14
ADLS_ACUITY_SCORE: 17
ADLS_ACUITY_SCORE: 26
WEAR_GLASSES_OR_BLIND: YES
ADLS_ACUITY_SCORE: 23
ADLS_ACUITY_SCORE: 25
ADLS_ACUITY_SCORE: 23
ADLS_ACUITY_SCORE: 24
ADLS_ACUITY_SCORE: 24
ADLS_ACUITY_SCORE: 12
ADLS_ACUITY_SCORE: 24
ADLS_ACUITY_SCORE: 16
ADLS_ACUITY_SCORE: 16
ADLS_ACUITY_SCORE: 24
ADLS_ACUITY_SCORE: 22
ADLS_ACUITY_SCORE: 26
ADLS_ACUITY_SCORE: 17
ADLS_ACUITY_SCORE: 26
ADLS_ACUITY_SCORE: 16
ADLS_ACUITY_SCORE: 19
ADLS_ACUITY_SCORE: 16
ADLS_ACUITY_SCORE: 23
ADLS_ACUITY_SCORE: 14
ADLS_ACUITY_SCORE: 16
ADLS_ACUITY_SCORE: 14
ADLS_ACUITY_SCORE: 24
ADLS_ACUITY_SCORE: 26
ADLS_ACUITY_SCORE: 22
ADLS_ACUITY_SCORE: 10
ADLS_ACUITY_SCORE: 24
ADLS_ACUITY_SCORE: 23
ADLS_ACUITY_SCORE: 24
ADLS_ACUITY_SCORE: 26
ADLS_ACUITY_SCORE: 16
ADLS_ACUITY_SCORE: 26
ADLS_ACUITY_SCORE: 23
ADLS_ACUITY_SCORE: 26
ADLS_ACUITY_SCORE: 16
ADLS_ACUITY_SCORE: 17
ADLS_ACUITY_SCORE: 26
ADLS_ACUITY_SCORE: 26
ADLS_ACUITY_SCORE: 14
ADLS_ACUITY_SCORE: 17
ADLS_ACUITY_SCORE: 26
ADLS_ACUITY_SCORE: 16
ADLS_ACUITY_SCORE: 22
ADLS_ACUITY_SCORE: 26
ADLS_ACUITY_SCORE: 26
ADLS_ACUITY_SCORE: 24
ADLS_ACUITY_SCORE: 24
ADLS_ACUITY_SCORE: 22
ADLS_ACUITY_SCORE: 26
ADLS_ACUITY_SCORE: 22
ADLS_ACUITY_SCORE: 25
ADLS_ACUITY_SCORE: 23
ADLS_ACUITY_SCORE: 25
ADLS_ACUITY_SCORE: 24
ADLS_ACUITY_SCORE: 16
ADLS_ACUITY_SCORE: 22
ADLS_ACUITY_SCORE: 22
WALKING_OR_CLIMBING_STAIRS: AMBULATION DIFFICULTY, REQUIRES EQUIPMENT
TOILETING_ASSISTANCE: TOILETING DIFFICULTY, REQUIRES EQUIPMENT;TOILETING DIFFICULTY, ASSISTANCE 1 PERSON
ADLS_ACUITY_SCORE: 19
ADLS_ACUITY_SCORE: 24
ADLS_ACUITY_SCORE: 22
ADLS_ACUITY_SCORE: 24
ADLS_ACUITY_SCORE: 22
ADLS_ACUITY_SCORE: 16
ADLS_ACUITY_SCORE: 16
ADLS_ACUITY_SCORE: 26
ADLS_ACUITY_SCORE: 24
ADLS_ACUITY_SCORE: 23
ADLS_ACUITY_SCORE: 26
ADLS_ACUITY_SCORE: 16
ADLS_ACUITY_SCORE: 10
ADLS_ACUITY_SCORE: 16
ADLS_ACUITY_SCORE: 24
ADLS_ACUITY_SCORE: 26
ADLS_ACUITY_SCORE: 17
ADLS_ACUITY_SCORE: 22
ADLS_ACUITY_SCORE: 26
ADLS_ACUITY_SCORE: 16
ADLS_ACUITY_SCORE: 24
ADLS_ACUITY_SCORE: 12
ADLS_ACUITY_SCORE: 25
ADLS_ACUITY_SCORE: 22
NUMBER_OF_TIMES_PATIENT_HAS_FALLEN_WITHIN_LAST_SIX_MONTHS: 1
ADLS_ACUITY_SCORE: 23
ADLS_ACUITY_SCORE: 24
ADLS_ACUITY_SCORE: 25
ADLS_ACUITY_SCORE: 26
ADLS_ACUITY_SCORE: 26
ADLS_ACUITY_SCORE: 24
ADLS_ACUITY_SCORE: 26
ADLS_ACUITY_SCORE: 22
ADLS_ACUITY_SCORE: 22
ADLS_ACUITY_SCORE: 16
ADLS_ACUITY_SCORE: 16
ADLS_ACUITY_SCORE: 24
ADLS_ACUITY_SCORE: 24
ADLS_ACUITY_SCORE: 22
ADLS_ACUITY_SCORE: 25
ADLS_ACUITY_SCORE: 26
ADLS_ACUITY_SCORE: 24
ADLS_ACUITY_SCORE: 24
ADLS_ACUITY_SCORE: 26
ADLS_ACUITY_SCORE: 17
ADLS_ACUITY_SCORE: 24
ADLS_ACUITY_SCORE: 16
ADLS_ACUITY_SCORE: 24
ADLS_ACUITY_SCORE: 10
ADLS_ACUITY_SCORE: 24
ADLS_ACUITY_SCORE: 24
ADLS_ACUITY_SCORE: 26
ADLS_ACUITY_SCORE: 16
ADLS_ACUITY_SCORE: 17
ADLS_ACUITY_SCORE: 24
ADLS_ACUITY_SCORE: 26
ADLS_ACUITY_SCORE: 25
ADLS_ACUITY_SCORE: 16
ADLS_ACUITY_SCORE: 25
ADLS_ACUITY_SCORE: 14
ADLS_ACUITY_SCORE: 23
ADLS_ACUITY_SCORE: 26
ADLS_ACUITY_SCORE: 16
ADLS_ACUITY_SCORE: 26
ADLS_ACUITY_SCORE: 23
ADLS_ACUITY_SCORE: 16
ADLS_ACUITY_SCORE: 23
ADLS_ACUITY_SCORE: 14
EQUIPMENT_CURRENTLY_USED_AT_HOME: WALKER, STANDARD
ADLS_ACUITY_SCORE: 22
ADLS_ACUITY_SCORE: 22
ADLS_ACUITY_SCORE: 16
ADLS_ACUITY_SCORE: 24
ADLS_ACUITY_SCORE: 16
ADLS_ACUITY_SCORE: 24
ADLS_ACUITY_SCORE: 24
ADLS_ACUITY_SCORE: 16
ADLS_ACUITY_SCORE: 26
ADLS_ACUITY_SCORE: 16
ADLS_ACUITY_SCORE: 23
ADLS_ACUITY_SCORE: 19
ADLS_ACUITY_SCORE: 24
ADLS_ACUITY_SCORE: 24
ADLS_ACUITY_SCORE: 16
ADLS_ACUITY_SCORE: 16
ADLS_ACUITY_SCORE: 23
ADLS_ACUITY_SCORE: 24

## 2022-01-01 ASSESSMENT — ENCOUNTER SYMPTOMS
CONFUSION: 1
SHORTNESS OF BREATH: 1
WHEEZING: 1
DIZZINESS: 0
NAUSEA: 0
VOMITING: 0

## 2022-02-24 PROBLEM — I50.21 ACUTE SYSTOLIC CONGESTIVE HEART FAILURE (H): Status: ACTIVE | Noted: 2022-01-01

## 2022-02-24 PROBLEM — J96.01 ACUTE RESPIRATORY FAILURE WITH HYPOXIA (H): Status: ACTIVE | Noted: 2022-01-01

## 2022-02-24 NOTE — ED NOTES
Bed: ED27  Expected date: 2/24/22  Expected time: 4:59 PM  Means of arrival: Ambulance  Comments:  Ed 89m sob  ETA [id number]

## 2022-02-24 NOTE — ED PROVIDER NOTES
"  History   Chief Complaint:  Shortness of Breath       The history is provided by the patient, the EMS personnel and the spouse. History limited by: Dementia.      Irvin Bridges is a 89 year old male with history of dementia, CKD and aortic atherosclerosis who presents via EMS with shortness of breath. The patient woke up this morning with increased work of breathing both at rest and with exertion. His wife also observed him to be acting \"foggier\" than normal and ultimately called EMS. When medics arrived on scene they recorded SpO2 of 76%, this increased to 85% when placed on 4L O2. EMS noted expiratory wheezes bilaterally, moreso on the right. Patient was given 1 Duoneb en route during which he was satting at 96%. Upon arrival to ED he is currently satting in the low 90's on 6L O2. EKG was also completed PTA which reportedly showed some T wave abnormalities. Patient states that he has not needed interventions for shortness of breath in the past and is not typically on oxygen at home. Wife reportedly told EMS that this Lasix was recently increased to 20 mg daily on 02/16/2022. No chest pain, nausea, vomiting, or dizziness. No other ill contacts at home.    Additional history obtained from patient's wife. She states that patient's symptoms began later this morning and he has never experienced anything like this before in the past. Wife also confirms his Lasix dose increase to 20 mg.    Review of Systems   Respiratory: Positive for shortness of breath and wheezing (per EMS).    Cardiovascular: Negative for chest pain.   Gastrointestinal: Negative for nausea and vomiting.   Neurological: Negative for dizziness.   Psychiatric/Behavioral: Positive for confusion (per wife).   All other systems reviewed and are negative.    Allergies:  Ampicillin  Rosuvastatin  Memantine    Medications:  Furosemide  Amlodipine  Carvedilol  Omeprazole    Past Medical History:     CKD  Degenerative arthritis  Hypertension  Aortic " "atherosclerosis  Dyslipidemia    Past Surgical History:    Cholecystectomy  Bilateral knee arthroscopy  Appendectomy    Family History:    Father - MI  Mother - Alzheimer's dementia  Sister - sleep apnea, Meniere's disease    Social History:  The patient was accompanied to the ED by EMS.  Marital status:   The patient resides at home with his wife.  Patient typically receives care through [facility].    Physical Exam     Patient Vitals for the past 24 hrs:   BP Temp Temp src Pulse Resp SpO2 Height Weight   02/24/22 1739 (!) 151/87 98  F (36.7  C) Temporal 69 18 (!) 84 % 1.651 m (5' 5\") 65.8 kg (145 lb)       Physical Exam  General: Patient is alert and dyspneic appearing.  HEENT: Head atraumatic    Eyes: pupils equal and reactive. Conjunctiva clear   Nares: patent   Oropharynx: no lesions, uvula midline, no palatal draping, normal voice, no trismus  Neck: Supple without lymphadenopathy, no meningismus  Chest: Heart regular rate and rhythm.   Lungs: Tachypnea with conversational dyspnea, rales bilaterally to the mid lung, no wheezes  Abdomen: Soft, non tender, nondistended, normal bowel sounds  Back: No costovertebral angle tenderness, no midline C, T or L spine tenderness  Neuro: Grossly nonfocal, normal speech, strength equal bilaterally, CN 2-12 intact  Extremities: No deformities, equal radial and DP pulses. No clubbing, cyanosis.  Bilateral lower extremity pitting edema  Skin: Warm and dry with no rash.       Emergency Department Course   ECG  ECG obtained at 1737, ECG read at 1746  Normal sinus rhythm  Possible Left atrial enlargement  Inferior infarct, age undetermined  ST & T wave abnormality, consider lateral ischemia   Agree with computer interpretation.   Rate 70 bpm. MO interval 168 ms. QRS duration 104 ms. QT/QTc 398/429 ms. P-R-T axes 39 15 200.     Imaging:  XR Chest Port 1 View:  Diffuse bilateral interstitial and alveolar opacities, vascular indistinctness and at least small pleural effusions " compatible with edema. Cannot exclude superimposed infectious / inflammatory process. Mildly enlarged cardiac silhouette.  Report per radiology.    Laboratory:  CBC: WBC 9.3, HGB 8.0 (L),   CMP: Glucose 127 (H), Chloride 113 (H), CO2 19 (L), Urea nitrogen 45 (H), Albumin 3.0 (L), GFR estimate 27 (L), Creatinine 2.29 (H), o/w WNL    Blood gas venous with Oxyhemoglobin: pH Venous 7.36, PCO2 Venous 36 (L), PO2 Venous 42, Bicarbonate 20 (L), Oxyhemoglobin 71, Base Excess -5.0, FIO2 21    Troponin I high sensitivity (Collected 1748): 359 (HH)  Troponin I high sensitivity (Collected 1945): 356 (HH)    N-Terminal Pro BNP: >35.000 (H)     Symptomatic SARS-CoV2 (COVID-19) Virus: Negative  Influenza A/B Virus: Negative    Procedures  None.    Emergency Department Course:     Reviewed:  I reviewed nursing notes, vitals, past medical history and Care Everywhere    Assessments:  1723 I obtained history and examined the patient in ED room 27 as noted above.   1817 Additional history obtained from wife, noted above in HPI.  Patient was placed on BiPAP for continued hypoxia despite nonrebreather  1919 I rechecked the patient.  2105 Per RN the patient voided 450 cc of urine without catheter.    Consults:  1916 I consulted with Dr. Cardona of the hospitalist service regarding patient, who agrees to admit patient to hospital in monitored bed.   2001 I spoke with Dr. Cardona regarding patient.    Interventions:  1842 Lasix 40 mg IV    Disposition:  The patient was admitted to the hospital under the care of Dr. Cardona.     Impression & Plan       Medical Decision Making:  Irvin Bridges is a 89 year old male with a PMH of chronic renal insufficiency and anemia who presents for evaluation of shortness of breath.  I considered a broad differential of their dyspnea including CHF exacerbation, PE, dissection, hemothorax, pleural effusion, pneumonia, acute coronary syndrome, reactive airway disease, bronchitis, upper airway  obstruction, foreign body, etc.  Given the history and exam plus laboratory findings I feel congestive heart failure is the most likely etiology and would not do extensive workup for other causes as mentioned above at this time.  The patient received IV diuretics in ED and felt improved; will start I/O's here in ED.  Will admit at this time for further cares.  Patient was requiring BiPAP due to her being on 10 L oxygen mask with continued hypoxia.  He is tolerating the BiPAP well with good O2 saturations.  His troponin is elevated but I feel this is likely related to demand ischemia.  He denies any chest pain.  We will repeat the troponin at 2 hours and if increasing will start him on heparin drip otherwise will maintain continued rule out in the hospital and echocardiogram. Suspect likely demand ischemia. Patient is agreeable with the plan.  He does have chronic renal insufficiency and Lasix was doubled IV here from his home dose.  He has had no worsening of his creatinine since increasing his Lasix at home.  No signs of pneumonia, pneumothorax.  Doubt pulmonary embolism.    I would rule them out for ACS in the hospital given their presentation but this seems classic CHF.        Critical Care Time: was 30 minutes for this patient excluding procedures.    Diagnosis:    ICD-10-CM    1. Acute systolic congestive heart failure (H)  I50.21    2. Acute respiratory failure with hypoxia (H)  J96.01          Scribe Disclosure:  I, Rukhsana Rodriguez, am serving as a scribe at 5:23 PM on 2/24/2022 to document services personally performed by Jayde Farr MD based on my observations and the provider's statements to me.     This note was completed in part using Dragon voice recognition software. Although reviewed after completion, some word and grammatical errors may occur.              Jayde Farr MD  02/24/22 2117

## 2022-02-24 NOTE — ED TRIAGE NOTES
Arrives via EMS from home. Woke up this afternoon with SOB. EMS found him to be 70's SpO2 on RA. Applied O2 and transported to ED.

## 2022-02-25 NOTE — PLAN OF CARE
IMC Status Alert to self only, slightly hypertensive this AM, resolved with BP medications.  Was on BiPAP and transitioned to OxyMask early morning.  Tolerating mask well at 12L, diuresing him with IV Lasix, with good UOP, d-sats while voiding, recovers well.  Applied Mepliex to bottom because of blanchable redness.  Had a BM, up to the commode with assist of 1-2.  Tolerating low sodium diet well.  Wife visited at bedside.  Had a sitter/long arm during part of the shift due to some impulsiveness and confusion.  Tele NSR with ST depression, occasional PVCs.  Echo was done, cardiology consulted.  Edema in lower extremities, attempted ace wraps, pt did not tolerate.

## 2022-02-25 NOTE — PROVIDER NOTIFICATION
"Paged Dr. Dias at 1700, via [employer] Messaging.    \"Patient was weak getting to the commode, do you want a PT/OT consult?\"    Awaiting response.  "

## 2022-02-25 NOTE — PROGRESS NOTES
BiPAP placed on pt 12/6 RR12 70%   Skin intact   Alarm volume 10.    Oxygen was weaned down to 60%.    RT will continue to follow and monitor.     Natasha Fonseca, RRT  2/24/2022

## 2022-02-25 NOTE — PROVIDER NOTIFICATION
MD Notification    Notified Person: MD Meza    Notified Person Name:    Notification Date/Time: 2/25 0230    Notification Interaction:    Purpose of Notification: pt became anxious and agitated     Orders Received: 0.5mg ativan     Comments:

## 2022-02-25 NOTE — PROVIDER NOTIFICATION
"Paged Dr. Dias through [employer] at 1412.    \"Home med was added today that was missed. Would you be able to reorder Lexapro? It was added to med list now. Thanks! Nico Caal RN\"    Awaiting response.  "

## 2022-02-25 NOTE — PLAN OF CARE
A/O x2, D/O to situation and place. Pt got to [facility] at 2200. Hx of dementia. VSS ex HTN. Tele: NSR. Pt on bipap 80%. BS: active, flatus +. NPO while on bipap, low sodium diet when off bipap. Bilateral edema in LE. Up with 1. Voiding via Urinal. Pt became anxious and agitated and ativan was given, see previous note. Sitter was assigned at 0000.

## 2022-02-25 NOTE — PROGRESS NOTES
[facility]    Medicine Progress Note - Hospitalist Service    Date of Admission:  2/24/2022    Assessment & Plan            Irvin Bridges is a 89 year old male with history of dementia, CKD and aortic atherosclerosis who presents via EMS with shortness of breath.      Acute hypoxic respiratory failure due to probable acute CHF with pulmonary edema  Probable type II NSTEMI  Patient reportedly with acute dyspnea x 1 day without chest pain, palpitations, cough, sputum.  Recently increased PTA lasix due to LE edema (followed in [facility]).  No reported hx of CAD or CHF with last echo 12/2011 showing normal EF without valvular disease.  EMS reports initial O2 sats 76%, placed on Bipap following arrival.  CXR consistent with acute pulmonary edema, also noted diffuse bilateral interstitial opacities, BNP 35K.  Serial trop mildly elevated but flat, EKG without acute ST-T elevation, flipped T laterally.  No leukocytosis, afebrile with low procal.  Covid NEG.     -net negative 1L with single dose dose IV lasix; continue furosemide IV 40 mg twice daily  -Monitor I's/O, daily weights  -ECHO pending  -Telemetry  -Wean Bipap as able    ADDENDUM:  TTE shows EF 35-40% with severe inferior wall hypokinesis.  Will consult cardiology given new findings.    Chronic macrocytic anemia  Baseline hgb 8-9, admission 8.0.  No S/S active bleeding.  -Continue PTA B12  -hgb stable 2/26  -check iron studies    CKD, baseline CR 2.2. admssion 2.29.     Followed by [facility].  Bladder scan negative in ED  -Avoid nephrotoxin  -follow BMP with diuresis, remains stable 2/25    Hypertension  -Continue PTA amlodipine and carvedilol    Chronic low back pain  No acute change.   -continue PTA tylenol tid  -PT as able         Diet: Combination Diet Low Saturated Fat Na <2400mg Diet, No Caffeine Diet    DVT Prophylaxis: Pneumatic Compression Devices  King Catheter: Not present  Central Lines: None  Cardiac Monitoring:  "ACTIVE order. Indication: Acute decompensated heart failure (48 hours)  Code Status:   DNI    Disposition Plan   Expected Discharge: 02/28/2022     Anticipated discharge location:  Awaiting care coordination huddle  Delays:            The patient's care was discussed with the Bedside Nurse, Patient and Patient's Family.    Dl Dias MD  Hospitalist Service  [facility]  Securely message with the Vocera Web Console (learn more here)  Text page via [employer] Paging/Directory         Clinically Significant Risk Factors Present on Admission                 # Overweight: Estimated body mass index is 25.29 kg/m  as calculated from the following:    Height as of this encounter: 1.651 m (5' 5\").    Weight as of this encounter: 68.9 kg (152 lb).      ______________________________________________________________________    Interval History   Reports dyspnea is improved today.  Does indicate some upper chest pain earlier but none currently.  Denies any fever/chills, cough or other complaints.     Data reviewed today: I reviewed all medications, new labs and imaging results over the last 24 hours. I personally reviewed no images or EKG's today.    Physical Exam   Vital Signs: Temp: 98  F (36.7  C) Temp src: Temporal BP: (!) 145/85 Pulse: 69   Resp: 16 SpO2: 98 % O2 Device: (P) BiPAP/CPAP Oxygen Delivery: 15 LPM  Weight: 152 lbs 0 oz  General Appearance: Well nourished male in NAD  Respiratory: diminished bibasilar lung sounds, no crackles or wheezing, no tachypnea on Bipap  Cardiovascular: RRR, normal s1/s2 with grade 2/6 systolic murmur  GI: abdomen soft, normal bowel sounds  Skin: 2+ pitting lower extremity edema   Other: Alert and appropriate, cranial nerves grossly intact      Data   Recent Labs   Lab 02/25/22  0510 02/24/22  1748   WBC 9.7 9.3   HGB 7.8* 8.0*   * 107*    268    139   POTASSIUM 4.1 4.9   CHLORIDE 114* 113*   CO2 21 19*   BUN 45* 45*   CR 2.37* 2.29*   ANIONGAP 5 " 7   ABHI 8.9 9.3   * 127*   ALBUMIN  --  3.0*   PROTTOTAL  --  6.8   BILITOTAL  --  0.3   ALKPHOS  --  79   ALT  --  21   AST  --  18

## 2022-02-25 NOTE — ED NOTES
"[facility]  ED Nurse Handoff Report    ED Chief complaint: Shortness of Breath      ED Diagnosis:   Final diagnoses:   None       Code Status: TBD by hospitalist at admission    Allergies:   Allergies   Allergen Reactions     Lactose Unknown     Memantine Other (See Comments)     Ampicillin Other (See Comments) and Unknown     Unknown, states he hasn't had it in 40 yrs         Rosuvastatin Other (See Comments) and Unknown     myositis           Patient Story:   89 year old male with history of dementia, CKD and aortic atherosclerosis who presents via EMS with shortness of breath. The patient woke up this morning with increased work of breathing both at rest and with exertion. His wife also observed him to be acting \"foggier\" than normal and ultimately called EMS. When medics arrived on scene they recorded SpO2 of 76%, this increased to 85% when placed on 4L O2. EMS noted expiratory wheezes bilaterally, moreso on the right. Patient was given 1 Duoneb en route during which he was satting at 96%. Upon arrival to ED he is currently satting in the low 90's on 6L     Focused Assessment:    Alert but has some memory issues, reportedly has dementia as his baseline. Very pale, very SOB on room air, wheezing and very wet sounding lungs, marked edema at the ankles and lower legs. Had to increase to 15L O2 on oxymask to maintain SpO2 in the low 90's. Changed over to BiPAP. 12/6 60% at start.    Labs Ordered and Resulted from Time of ED Arrival to Time of ED Departure   COMPREHENSIVE METABOLIC PANEL - Abnormal       Result Value    Sodium 139      Potassium 4.9      Chloride 113 (*)     Carbon Dioxide (CO2) 19 (*)     Anion Gap 7      Urea Nitrogen 45 (*)     Creatinine 2.29 (*)     Calcium 9.3      Glucose 127 (*)     Alkaline Phosphatase 79      AST 18      ALT 21      Protein Total 6.8      Albumin 3.0 (*)     Bilirubin Total 0.3      GFR Estimate 27 (*)    TROPONIN I - Abnormal    Troponin I High Sensitivity " 359 (*)    NT PROBNP INPATIENT - Abnormal    N terminal Pro BNP Inpatient >35,000 (*)    BLOOD GAS VENOUS WITH OXYHEMOGLOBIN - Abnormal    pH Venous 7.36      pCO2 Venous 36 (*)     pO2 Venous 42      Bicarbonate Venous 20 (*)     FIO2 21      Oxyhemoglobin Venous 71      Base Excess/Deficit (+/-) -5.0     CBC WITH PLATELETS AND DIFFERENTIAL - Abnormal    WBC Count 9.3      RBC Count 2.42 (*)     Hemoglobin 8.0 (*)     Hematocrit 26.0 (*)      (*)     MCH 33.1 (*)     MCHC 30.8 (*)     RDW 15.9 (*)     Platelet Count 268      % Neutrophils 81      % Lymphocytes 8      % Monocytes 10      % Eosinophils 0      % Basophils 0      % Immature Granulocytes 1      NRBCs per 100 WBC 0      Absolute Neutrophils 7.6      Absolute Lymphocytes 0.7 (*)     Absolute Monocytes 0.9      Absolute Eosinophils 0.0      Absolute Basophils 0.0      Absolute Immature Granulocytes 0.1      Absolute NRBCs 0.0     INFLUENZA A/B & SARS-COV2 PCR MULTIPLEX - Normal    Influenza A PCR Negative      Influenza B PCR Negative      SARS CoV2 PCR Negative          XR Chest Port 1 View   Final Result   IMPRESSION: Diffuse bilateral interstitial and alveolar opacities, vascular indistinctness and at least small pleural effusions compatible with edema. Cannot exclude superimposed infectious / inflammatory process. Mildly enlarged cardiac silhouette.                  Treatments and/or interventions provided:   bipap and lasix. Male purewick in place but no urine output so far  Medications   furosemide (LASIX) injection 40 mg (40 mg Intravenous Given 2/24/22 1842)        Patient's response to treatments and/or interventions:   resting more comfortably while on BiPAP    To be done/followed up on inpatient unit:    See any in-patient orders    Does this patient have any cognitive concerns?: Baseline dementia    Activity level - Baseline/Home:    Walker    Activity Level - Current:     Stand with assist x2, Walker and Wheelchair    Patient's  "Preferred language: English     Needed?: No    Isolation: COVID r/o and special precautions  Infection: COVID r/o and special precautions  Patient tested for COVID 19 prior to admission: YES    Bariatric?: No    Vital Signs:   Vitals:    02/24/22 1739   BP: (!) 151/87   Pulse: 69   Resp: 18   Temp: 98  F (36.7  C)   TempSrc: Temporal   SpO2: (!) 84%   Weight: 65.8 kg (145 lb)   Height: 1.651 m (5' 5\")       Cardiac Rhythm:     Was the PSS-3 completed:   Yes  What interventions are required if any?               Family Comments: wife at bedside  OBS brochure/video discussed/provided to patient/family: Yes              Name of person given brochure if not patient:               Relationship to patient:     For the majority of the shift this patient's behavior was Green.   Behavioral interventions performed were.    ED NURSE PHONE NUMBER: [phone]          "

## 2022-02-25 NOTE — ED NOTES
DATE:  2/24/2022   TIME OF RECEIPT FROM LAB:  1846  LAB TEST:  Trop  LAB VALUE:  359  RESULTS GIVEN WITH READ-BACK TO (PROVIDER):  Jayde Farr MD  TIME LAB VALUE REPORTED TO PROVIDER:   2497

## 2022-02-25 NOTE — PHARMACY-ADMISSION MEDICATION HISTORY
Pharmacy Medication History  Admission medication history interview status for the 2/24/2022  admission is complete. See EPIC admission navigator for prior to admission medications     Location of Interview: Outside patient room but on unit  Medication history sources: Patient's family/friend (wife Melissa) and Care Everywhere    Significant changes made to the medication list:  Added all to list     In the past week, patient estimated taking medication this percent of the time: greater than 90%    Additional medication history information:   Melissa notes that furosemide was just increased from 10 mg to 20 mg within the last week.  Noted omeprazole on med list with [facility] Torres notes they do not use omeprazole.      Medication reconciliation completed by provider prior to medication history? No    Time spent in this activity: 15 min       Prior to Admission medications    Medication Sig Last Dose Taking? Auth Provider   acetaminophen (TYLENOL) 500 MG tablet Take 1,000 mg by mouth 3 times daily  2/24/2022 at Unknown time Yes Unknown, Entered By History   amLODIPine (NORVASC) 10 MG tablet Take 10 mg by mouth daily 2/24/2022 at Unknown time Yes Unknown, Entered By History   calcium carbonate (TUMS) 500 MG chewable tablet Take 1 chew tab by mouth 2 times daily as needed for heartburn  at prn Yes Unknown, Entered By History   carvedilol (COREG) 12.5 MG tablet Take 12.5 mg by mouth 2 times daily 2/24/2022 at x1 Yes Unknown, Entered By History   escitalopram (LEXAPRO) 5 MG tablet Take 5 mg by mouth daily  Yes Unknown, Entered By History   furosemide (LASIX) 20 MG tablet Take 20 mg by mouth daily 2/24/2022 at Unknown time Yes Unknown, Entered By History   polyethylene glycol-propylene glycol (SYSTANE ULTRA) 0.4-0.3 % SOLN ophthalmic solution Place 1 drop into both eyes every hour as needed for dry eyes  at prn Yes Unknown, Entered By History   potassium chloride ER (KLOR-CON M) 20 MEQ CR tablet Take 20 mEq by mouth  2 times daily 2/24/2022 at x1 Yes Unknown, Entered By History   vitamin B-12 (CYANOCOBALAMIN) 1000 MCG tablet Take 1 tablet by mouth daily 2/24/2022 at Unknown time Yes Unknown, Entered By History       The information provided in this note is only as accurate as the sources available at the time of update(s)

## 2022-02-25 NOTE — H&P
[facility]    History and Physical  Hospitalist       Date of Admission:  2/24/2022    Assessment & Plan   Irvin Bridges is a 89 year old male with history of dementia, CKD and aortic atherosclerosis who presents via EMS with shortness of breath.    Shortness of breath  Acute pulmonary edema  Elevated troponin  Patient reportedly with acute dyspnea x 1 day without chest pain, palpitations, cough, sputum.  Followed in [facility] and was told last week to increase his furosemide from 10 to 20 mg with diagnosis of leg edema.  Patient wife does not report a diagnosis of CAD or CHF with last echo in the [facility] system over 10 years ago.  EMS reports initial O2 sats 76%, increased to 85% on 4 L.  In ED O2 sat 90s on 6 L, CXR consistent with acute pulmonary edema, also noted diffuse bilateral interstitial opacities, BNP 35K.  Troponin initial 359, second 356, EKG without acute ST-T elevation, flipped T laterally.  WBC 9.3, macrocytic hemoglobin 8, in [facility] system baseline 8-9 maintained on B12, CR 2.29, baseline 2.2.  In ED given furosemide IV 40 mg, initially no urine output, external catheter placed, in ED no elevated bladder scan, dyspnea improved on BiPAP.  Covid NEG  -Continue diuresis with furosemide IV 40 mg twice daily, recheck BMP and magnesium in a.m.  -Monitor I's/O, daily weights  -Trend troponin however first 2 flat; suspect elevated troponins secondary to acute pulmonary edema  -ECHO  -Telemetry  -Wean O2 as able, suspect will be able to wean off BiPAP with effective diuresis  -Suspect CXR findings secondary to acute pulmonary edema, no fever, leukocytosis to suspect underlying pneumonia, monitor temp profile, WBC in a.m., check pro-Dontrell    Chronic macrocytic anemia  Baseline CR 8-9, admission 8.0.  No S/S active bleeding.  Chronic anemia may be exacerbating dyspnea with minimal O2 carrying capacity however hemoglobin appears at baseline and reluctant to transfusion given  CHF.  -Continue PTA B12, recheck CBC in a.m.    CKD, baseline CR 2.2. admssion 2.29.   Followed by [facility]  -Bladder scan negative in ED  -Avoid nephrotoxin  -Recheck BMP in a.m. on diuresis    Hypertension  PTA amlodipine  -Continue amlodipine however may be contributory to chronic leg edema.    Chronic low back pain  No new or changed S/S.  Patient and wife states controlled with scheduled acetaminophen 3 times daily, continue  -PT as able      DVT Prophylaxis: Pneumatic Compression Devices  Code Status: CPR; no intubation  Expected Discharge: TBD pending clinical improvement; safe dispositio      Devan Cardona MD    Primary Care Physician   Physician No Ref-Primary    Chief Complaint   Shortness of breath    History is obtained from the patient's wife and ED provider    History of Present Illness   Irvin Bridges is a 89 year old male with history of dementia, CKD and aortic atherosclerosis who presents via EMS with shortness of breath. Patient reportedly with acute dyspnea x 1 day without chest pain, palpitations, cough, sputum.  Followed in [facility] and was told last week to increase his furosemide from 10 to 20 mg with diagnosis of leg edema.  Patient wife does not report a diagnosis of CAD or CHF with last echo in the [facility] system over 10 years ago.  EMS reports initial O2 sats 76%, increased to 85% on 4 L.  In ED O2 sat 90s on 6 L, CXR consistent with acute pulmonary edema, also noted diffuse bilateral interstitial opacities, BNP 35K.  Troponin initial 359, second 356, EKG without acute ST-T elevation, flipped T laterally.  WBC 9.3, macrocytic hemoglobin 8, in [facility] system baseline 8-9 maintained on B12, CR 2.29, baseline 2.2.  In ED given furosemide IV 40 mg, initially no urine output, external catheter placed, in ED no elevated bladder scan, dyspnea improved on BiPAP.  Covid NEG.  Remainder of HPI as above.      Past Medical History    I have reviewed this patient's medical  history and updated it with pertinent information if needed.   CKD, chronic anemia; leg edema    Past Surgical History   I have reviewed this patient's surgical history and updated it with pertinent information if needed.  No past surgery by report      Prior to Admission Medications   Prior to Admission Medications   Prescriptions Last Dose Informant Patient Reported? Taking?   acetaminophen (TYLENOL) 500 MG tablet 2/24/2022 at Unknown time  Yes Yes   Sig: Take 1,000 mg by mouth 3 times daily    amLODIPine (NORVASC) 10 MG tablet 2/24/2022 at Unknown time  Yes Yes   Sig: Take 10 mg by mouth daily   calcium carbonate (TUMS) 500 MG chewable tablet  at prn  Yes Yes   Sig: Take 1 chew tab by mouth 2 times daily as needed for heartburn   carvedilol (COREG) 12.5 MG tablet 2/24/2022 at x1  Yes Yes   Sig: Take 12.5 mg by mouth 2 times daily   furosemide (LASIX) 20 MG tablet 2/24/2022 at Unknown time  Yes Yes   Sig: Take 20 mg by mouth daily   polyethylene glycol-propylene glycol (SYSTANE ULTRA) 0.4-0.3 % SOLN ophthalmic solution  at prn  Yes Yes   Sig: Place 1 drop into both eyes every hour as needed for dry eyes   potassium chloride ER (KLOR-CON M) 20 MEQ CR tablet 2/24/2022 at x1  Yes Yes   Sig: Take 20 mEq by mouth 2 times daily   vitamin B-12 (CYANOCOBALAMIN) 1000 MCG tablet 2/24/2022 at Unknown time  Yes Yes   Sig: Take 1 tablet by mouth daily      Facility-Administered Medications: None     Allergies   Allergies   Allergen Reactions     Lactose Unknown     Memantine Other (See Comments)     Ampicillin Other (See Comments) and Unknown     Unknown, states he hasn't had it in 40 yrs         Rosuvastatin Other (See Comments) and Unknown     myositis           Social History   I have reviewed this patient's social history and updated it with pertinent information if needed.  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   Neg for cancer, diabetes    Review of Systems   The 10 point Review of Systems is negative other than noted in the HPI     Physical Exam   Temp: 98  F (36.7  C) Temp src: Temporal BP: (!) 146/87 Pulse: 80   Resp: 29 SpO2: 92 % O2 Device: BiPAP/CPAP    Vital Signs with Ranges  Temp:  [98  F (36.7  C)] 98  F (36.7  C)  Pulse:  [64-80] 80  Resp:  [12-29] 29  BP: (135-151)/(78-87) 146/87  FiO2 (%):  [60 %] 60 %  SpO2:  [84 %-97 %] 92 %  145 lbs 0 oz    General/Constitutional:   NAD, appears weak; pallor of known chronic anemia; calm, cooperative    HEENT/Head Exam:  atraumatic  Eyes:  PERRL, no conjunctivits  Mouth/Oral Pharynx:  Buccal mucosa WNL  Chest/Respiratory:  Air exchange bilateral lung fields; rales at bases. Respiration nonlabored on BiPap.  Cardiovascular:  no murmur appreciated.  LE edema 2/4  Gastrointestinal/Abdomen:  soft, nontender,   no rebound, guarding or other peritoneal signs.  Musculoskeletal:  extremities warm, dry, noncyanotic; no acitve synovitis.  Neuro.  Gross motor tested, nonfocal, sensory intact  Psych oriented, affect calm       Data     Recent Labs   Lab 02/24/22  1748   WBC 9.3   HGB 8.0*   *         POTASSIUM 4.9   CHLORIDE 113*   CO2 19*   BUN 45*   CR 2.29*   ANIONGAP 7   ABHI 9.3   *   ALBUMIN 3.0*   PROTTOTAL 6.8   BILITOTAL 0.3   ALKPHOS 79   ALT 21   AST 18       Recent Results (from the past 24 hour(s))   XR Chest Port 1 View    Narrative    EXAM: XR CHEST PORT 1 VIEW  LOCATION: [facility]  DATE/TIME: 2/24/2022 5:50 PM    INDICATION: Shortness of breath.  COMPARISON: None.      Impression    IMPRESSION: Diffuse bilateral interstitial and alveolar opacities, vascular indistinctness and at least small pleural effusions compatible with edema. Cannot exclude superimposed infectious / inflammatory process. Mildly enlarged cardiac silhouette.

## 2022-02-25 NOTE — CONSULTS
[facility]  Cardiology Consultation     Date of Admission:  2/24/2022  Date of Consult (When I saw the patient): 02/25/22  Reason for Consult: CHF    Primary Cardiologist: no previous cardiology care    History of Present Illness   Irvin Bridges is a 89 year old male who presents with progressive shortness of breath, peripheral edema, and weight gain.  He was evaluated by his PCP 1 week ago at which time he only had reports of worsening peripheral edema.  His Lasix was increased from 10 mg to 20 mg daily.  He has no known history of cardiac history.  During his PCP visit he was also referred to nephrology for worsening renal function as well as neurology for progressive cognitive issues and signs of dementia.    Irvin unfortunately had significant shortness of breath, orthopnea, and abdominal distention yesterday prompting him to be seen in the emergency department.  His past medical history is notable for CKD (creatinine baseline around 2; he was recently referred to nephrology), chronic anemia (lab flowsheet shows hemoglobin baseline between 8 and 9; no signs of active bleeding; suspect anemia of chronic disease), cognitive impairment (recent referral to neurology for formal evaluation), hypertension, significant osteoarthritis/back pain, and chronic peripheral edema (previously he has been on Lasix 10 mg daily).    On this admission, he was initiated on IV diuresis as he had evidence of significant hypervolemia with chest x-ray showing vascular congestion and bilateral pleural effusions and his NT proBNP being significantly elevated.  There was some question of superimposed infection but he has been afebrile and his CBC shows no evidence of leukocytosis.  Procalcitonin was elevated but this is in the low normal range.  Covid has been negative.  Primary service has felt that there is no infectious process.  He was set up for a baseline echocardiogram on this admission.  This showed  LVEF reduction at 35 to 40% with severe inferior wall hypokinesis.  His ECG also showed ST depressions in the lateral leads.  Given these new findings cardiology was consulted for further recommendations.  Of note, patient is DNI.    -------------------------------------------------------------------------------------------    Assessment:  Irvin Bridges is a 89 year old male who was admitted on 2/24/2022 progressive shortness of breath, orthopnea, and peripheral edema.    # Acute HFrEF  --Chest x-ray shows congestion and bilateral pleural effusions, NT proBNP is significantly elevated  --Initiated on IV Lasix 40 mg twice daily with good response  --Currently on oxygen mask    # Systolic cardiomyopathy  --Echocardiogram on current admission showed LVEF of 35 to 40%  --Unclear if this is a new finding; his last echocardiogram was in 2011 which showed preserved LVEF  --Not on ACE due to history of CKD; PTA regimen already includes beta-blocker    #Trivial troponin elevation  --Troponin rise to 300 with no delta change  --ECG shows new ST depressions laterally  --Echocardiogram with inferior wall hypokinesis   --Poor historian; unclear if he has chest discomfort    #Dementia  --Had a discussion with his spouse, Melissa, over the phone who reports that patient has significant cognitive impairment and was recently referred to neurology for formal evaluation    #Hypertension  #CKD, creatinine baseline 2  #Chronic anemia, hemoglobin baseline 8-9 likely due to chronic disease  #DNI    -------------------------------------------------------------------------------------------    Plan:  --I had a detailed conversation with Irvin's spouse, Melissa, and his son, Jabari, in regards to his recent echocardiogram findings.  Irvin himself is not able to provide much history in terms of his symptoms as well as what his wishes would be.  The decision was made to proceed conservatively for now. Melissa would like to discuss with  her niece who is a physician.    --We will continue with IV diuresis (IV lasix 40 mg BID) as he is making good progress  --He will continue with beta-blocker  --We will avoid ACE inhibitor; Recommernd hydralazine/Imdur  --Consider decreasing amlodipine to 5 mg daily given significant history of peripheral edema  --Cardiology will continue to follow    I spent 40 minutes face-to-face or coordinating care of Irvin Bridges. Over 50% of our time on the unit was spent counseling the patient and/or coordinating care.      Clinically Significant Risk Factors Present on Admission                    Fluid & Electrolyte Disorders: Fluid overload, unspecified    Nephrology: CKD POA List: Stage 3b (GFR 30-44)    Neurology: Dementia: Unspecified dementia without behavioral disturbance        -------------------------------------------------------------------------------------------    Code Status    Special Code    Past Medical History   I have reviewed this patient's medical history and updated it with pertinent information if needed.   No past medical history on file.    Past Surgical History   I have reviewed this patient's surgical history and updated it with pertinent information if needed.  No past surgical history on file.    Prior to Admission Medications   Prior to Admission Medications   Prescriptions Last Dose Informant Patient Reported? Taking?   acetaminophen (TYLENOL) 500 MG tablet 2/24/2022 at Unknown time  Yes Yes   Sig: Take 1,000 mg by mouth 3 times daily    amLODIPine (NORVASC) 10 MG tablet 2/24/2022 at Unknown time  Yes Yes   Sig: Take 10 mg by mouth daily   calcium carbonate (TUMS) 500 MG chewable tablet  at prn  Yes Yes   Sig: Take 1 chew tab by mouth 2 times daily as needed for heartburn   carvedilol (COREG) 12.5 MG tablet 2/24/2022 at x1  Yes Yes   Sig: Take 12.5 mg by mouth 2 times daily   escitalopram (LEXAPRO) 5 MG tablet   Yes Yes   Sig: Take 5 mg by mouth daily   furosemide (LASIX) 20 MG tablet  2/24/2022 at Unknown time  Yes Yes   Sig: Take 20 mg by mouth daily   polyethylene glycol-propylene glycol (SYSTANE ULTRA) 0.4-0.3 % SOLN ophthalmic solution  at prn  Yes Yes   Sig: Place 1 drop into both eyes every hour as needed for dry eyes   potassium chloride ER (KLOR-CON M) 20 MEQ CR tablet 2/24/2022 at x1  Yes Yes   Sig: Take 20 mEq by mouth 2 times daily   vitamin B-12 (CYANOCOBALAMIN) 1000 MCG tablet 2/24/2022 at Unknown time  Yes Yes   Sig: Take 1 tablet by mouth daily      Facility-Administered Medications: None     Allergies   Allergies   Allergen Reactions     Lactose Unknown     Memantine Other (See Comments)     Ampicillin Other (See Comments) and Unknown     Unknown, states he hasn't had it in 40 yrs         Rosuvastatin Other (See Comments) and Unknown     myositis           Social History   I have reviewed this patient's social history and updated it with pertinent information if needed. Irvin Bridges      Family History   I have reviewed this patient's family history and updated it with pertinent information if needed.   No family history on file.    --------------------------------------------------------------------------------------------    Review of Systems   The 10 point Review of Systems is negative other than noted in the HPI or here.     Constitutional: NAD. Comfortable at rest.  Oxygen supplementation with oxymask. He is disoriented but alert.  Eyes: Pupils equal and round, conjunctivae and lids unremarkable, sclera white, no xanthalasma,   ENT: Satisfactory dentition.  No cyanosis or pallor.  Neck: No thyromegaly.     Respiratory: Diminished at the bases bilaterally.  Cardiovascular: Elevated JVP at 45 degree angle.  Regular rate and rhythm with no murmur rub or gallop.  GI: Soft, nontender, no hepatosplenomegaly, no masses, active bowel sounds.    Skin: No rash, erythema, ecchymosis.  Musculoskeletal: Normal muscle tone and strength. Normal gait. No spinal  deformities.  Neuropsychiatric: Oriented to person but not to time or place.  His affect normal.  No gross motor deficits.  Extremities: 2+ pitting edema bilaterally.  Vascular: 2+ DP and radial pulses bilaterally.    Data   Reviewed.       Alis Najera PA-C   2/25/2022  Pager: [phone]

## 2022-02-26 NOTE — PLAN OF CARE
Physical Therapy    Acknowledge PT evaluation. Per chart review and conversation with RN pt is not on any anticoagulants and with ongoing concerns for VTE.     Activity will be encouraged as tolerated once anticoagulation therapy for treatment   has been started. Will re-schedule PT evaluation for tomorrow 2/27/2022.     Sujey Geronimo, PT, DPT

## 2022-02-26 NOTE — PLAN OF CARE
Alert to self, confused and impulsive but redirectable frequently removes oxygen. LS diminished with crackles. Placed on bipap at 50% and tolerated for approximately 1 hr, currently on 15L oxymask. RAMON. VSS. Tele SR. King catheter placed for retention and accurate Is&Os. PIV SL. Cardiac diet. Up A2, GB/W.

## 2022-02-26 NOTE — PROGRESS NOTES
[facility]  Cardiology Consultation     Date of Admission:  2/24/2022  Date of Consult (When I saw the patient): 02/25/22  Reason for Consult: CHF    Primary Cardiologist: no previous cardiology care    History of Present Illness   Irvin Bridges is a 89 year old male who presents with progressive shortness of breath, peripheral edema, and weight gain.  He was evaluated by his PCP 1 week ago at which time he only had reports of worsening peripheral edema.  His Lasix was increased from 10 mg to 20 mg daily.  He has no known history of cardiac history.  During his PCP visit he was also referred to nephrology for worsening renal function as well as neurology for progressive cognitive issues and signs of dementia.    Irvin unfortunately had significant shortness of breath, orthopnea, and abdominal distention yesterday prompting him to be seen in the emergency department.  His past medical history is notable for CKD (creatinine baseline around 2; he was recently referred to nephrology), chronic anemia (lab flowsheet shows hemoglobin baseline between 8 and 9; no signs of active bleeding; suspect anemia of chronic disease), cognitive impairment (recent referral to neurology for formal evaluation), hypertension, significant osteoarthritis/back pain, and chronic peripheral edema (previously he has been on Lasix 10 mg daily).    On this admission, he was initiated on IV diuresis as he had evidence of significant hypervolemia with chest x-ray showing vascular congestion and bilateral pleural effusions and his NT proBNP being significantly elevated.  There was some question of superimposed infection but he has been afebrile and his CBC shows no evidence of leukocytosis.  Procalcitonin was elevated but this is in the low normal range.  Covid has been negative.  Primary service has felt that there is no infectious process.  He was set up for a baseline echocardiogram on this admission.  This showed  LVEF reduction at 35 to 40% with severe inferior wall hypokinesis.  His ECG also showed ST depressions in the lateral leads.  Given these new findings cardiology was consulted for further recommendations.  Of note, patient is DNI.    -------------------------------------------------------------------------------------------    Assessment:  Irvin Bridges is a 89 year old male who was admitted on 2/24/2022 progressive shortness of breath, orthopnea, and peripheral edema.    # Acute HFrEF  --Chest x-ray shows congestion and bilateral pleural effusions, NT proBNP is significantly elevated  --Initiated on IV Lasix 40 mg twice daily with good response  --Currently on oxygen mask    # Systolic cardiomyopathy  --Echocardiogram on current admission showed LVEF of 35 to 40%  --Unclear if this is a new finding; his last echocardiogram was in 2011 which showed preserved LVEF  --Not on ACE due to history of CKD; PTA regimen already includes beta-blocker    #Trivial troponin elevation  --Troponin rise to 300 with no delta change  --ECG shows new ST depressions laterally  --Echocardiogram with inferior wall hypokinesis   --Poor historian; unclear if he has chest discomfort    #Dementia  --Prior providers, Had a discussion with his spouse, Melissa, over the phone who reports that patient has significant cognitive impairment and was recently referred to neurology for formal evaluation    #Hypertension  #CKD, creatinine baseline 2  #Chronic anemia, hemoglobin baseline 8-9 likely due to chronic disease  #DNI    -------------------------------------------------------------------------------------------    Plan:  --Continue medical therapy at this time, Dr. Kang discussed with family and we are deferring invasive testing for now.  We will allow more time for family to discuss and finalize plans on Monday (such as should we do an angiogram to eval his cardiomyopathy etc.)  --We will continue with IV diuresis (IV lasix 40 mg BID) as  he is making good progress  --He will continue with beta-blocker  --We will avoid ACE inhibitor; Recommened hydralazine/Imdur  --Cardiology will continue to follow  -------------------------------------------------------------------------------------------    Code Status    Special Code    Constitutional: NAD. Comfortable at rest.  Oxygen supplementation with oxymask. He is disoriented but alert.  Eyes: Pupils equal and round, conjunctivae and lids unremarkable, sclera white, no xanthalasma,   ENT: Satisfactory dentition.  No cyanosis or pallor.  Neck: No thyromegaly.     Respiratory: Diminished at the bases bilaterally.  Cardiovascular: Elevated JVP at 45 degree angle.  Regular rate and rhythm with no murmur rub or gallop.  GI: Soft, nontender, no hepatosplenomegaly, no masses, active bowel sounds.    Skin: No rash, erythema, ecchymosis.  Musculoskeletal: Normal muscle tone and strength. Normal gait. No spinal deformities.  Neuropsychiatric: Oriented to person but not to time or place.  His affect normal.  No gross motor deficits.  Extremities: 2+ pitting edema bilaterally.  Vascular: 2+ DP and radial pulses bilaterally.    Data   Reviewed.

## 2022-02-26 NOTE — PROGRESS NOTES
[facility]    Medicine Progress Note - Hospitalist Service    Date of Admission:  2/24/2022    Assessment & Plan            Irvin Bridges is a 89 year old male with history of dementia, CKD and aortic atherosclerosis who presents via EMS with shortness of breath.      Acute hypoxic respiratory failure due to acute systolic CHF with pulmonary edema  Probable type II NSTEMI  Patient reportedly with acute dyspnea x 1 day without chest pain, palpitations, cough, sputum.  Recently increased PTA lasix due to LE edema (followed in [facility]).  No reported hx of CAD or CHF.  EMS reports initial O2 sats 76%, placed on Bipap following arrival.  CXR consistent with acute pulmonary edema, also noted diffuse bilateral interstitial opacities, BNP 35K.  Serial trop mildly elevated but flat, EKG without acute ST-T elevation, flipped T laterally.  TTE showing EF 35-40%, grade 1 diastolic dysfunction, global LV hypokinesia and severe inferior wall hypokinesis (prior TTE 12/2011 with normal LV function).  No leukocytosis, afebrile with low procal.  Covid NEG.     -net negative 1.5L in past 24 hours; continue lasix 40 mg IV bid  -continue PTA carvedilol  -initiated hydralazine and Imdur; amlodipine decreased 2/25  -Telemetry  - Cards recs appreciated, currently planning for conservative management  -weaned to 10L oxygen this morning, continue to wean as able  - still with significant oxygen needs despite not appearing significantly volume overloaded at this time; normal RV function on echo would argue against any central PE but may need to consider VQ scan  - check D-dimer  - encourage IS and acapella, get up to chair  -discontinue IMC status    ADDENDUM:  D-dimer elevated at 2.9.  Will check lower ext US and VQ scan.     Chronic macrocytic anemia  Baseline hgb 8-9, admission 8.0.  No S/S active bleeding.  Iron studies do not suggest deficiency.  B12 level 2/8/22 wnl.    -Continue PTA B12  -hgb down to 7.0  today; no signs of bleeding - will repeat at 1600 (consented wife for transfusion via phone 2/26); will transfuse if <7 or if Cardiology feels should keep >8 given CHF  - check folate level    CKD, baseline CR 2.2. admssion 2.29.     Followed by [facility].  Bladder scan negative in ED  -Avoid nephrotoxin  -follow BMP with diuresis, Cr up slightly 2/26    Hypertension  -antihypertensives as above    Urinary retention  King placed 2/26 due to retention.   - start flomax  - plan for TOV prior to discharge    Chronic low back pain  No acute change.   -continue PTA tylenol tid  -PT as able         Diet: Combination Diet Low Saturated Fat Na <2400mg Diet, No Caffeine Diet    DVT Prophylaxis: Pneumatic Compression Devices  King Catheter: PRESENT, indication: Retention;Strict 1-2 Hour I&O  Central Lines: None  Cardiac Monitoring: ACTIVE order. Indication: Acute decompensated heart failure (48 hours)  Code Status:   DNI    Disposition Plan   Expected Discharge: 02/28/2022     Anticipated discharge location:  Awaiting care coordination huddle  Delays:            The patient's care was discussed with the Bedside Nurse, Patient and Patient's Family.    Dl Dias MD  Hospitalist Service  [facility]  Securely message with the Vocera Web Console (learn more here)  Text page via [employer] Paging/Directory         Clinically Significant Risk Factors Present on Admission                  ______________________________________________________________________    Interval History   He complains of pain at the head of the penis.  Reports ongoing dyspnea today and endorses some chest heaviness but catheter discomfort remains his primary concern.      Data reviewed today: I reviewed all medications, new labs and imaging results over the last 24 hours. I personally reviewed no images or EKG's today.    Physical Exam   Vital Signs: Temp: 97.8  F (36.6  C) Temp src: Axillary BP: 119/63 Pulse: 60    Resp: 11 SpO2: 98 % O2 Device: Oxymask Oxygen Delivery: 15 LPM  Weight: 142 lbs 6.4 oz  General Appearance: Well nourished male in NAD  Respiratory: mildly diminished basilar lung sounds, few basilar crackles, no wheezing or tachypnea   Cardiovascular: RRR, normal s1/s2 with grade 2/6 systolic murmur  GI: abdomen soft, normal bowel sounds  Skin: 1+ pitting lower extremity edema   Other: Alert and appropriate, cranial nerves grossly intact      Data   Recent Labs   Lab 02/26/22  0505 02/25/22  0510 02/24/22  1748   WBC 8.8 9.7 9.3   HGB 7.0* 7.8* 8.0*   * 107* 107*    269 268    140 139   POTASSIUM 3.6 4.1 4.9   CHLORIDE 108 114* 113*   CO2 21 21 19*   BUN 52* 45* 45*   CR 2.58* 2.37* 2.29*   ANIONGAP 9 5 7   ABHI 8.5 8.9 9.3   * 125* 127*   ALBUMIN  --   --  3.0*   PROTTOTAL  --   --  6.8   BILITOTAL  --   --  0.3   ALKPHOS  --   --  79   ALT  --   --  21   AST  --   --  18

## 2022-02-26 NOTE — PROVIDER NOTIFICATION
Paged Dr. Dias with [employer] messaging at [phone]    Patient feels constipated, can you add stool softeners? No PRN ordered    Awaiting response.

## 2022-02-26 NOTE — PROVIDER NOTIFICATION
Dr. Meza paged   Pt retaining, feels like he has to pee, restless, PVR>350, diuresising toussaint or straight cath? Thanks    Received orders for toussaint catheter, placed with brisk urine output.

## 2022-02-27 NOTE — PLAN OF CARE
IMC Status discontinued. Alert to self only, VSS on 8-10L oxymask/oxymizer. Diuresing him with IV Lasix, with good UOP with toussaint, d-sats while voiding, recovers well.  Mepliex on bottom because of blanchable redness.  Had a BM, up to the commode with assist of 2.  Tolerating low sodium diet well.  Wife visited at bedside.  Had a long arm during part of the shift due to some impulsiveness and confusion.  Tele NSR with ST depression, occasional PVCs.  BLE ultrasound completed to rule out DVT, negative.  Patient seemed more confused and anxious today, would fixate on certain things like catheter and call light.  Suspected R inguinal hernia.  Pt had pain in lower back and left shoulder, treated with heat, ice, repositioning, aromatherapy and scheduled tylenol.

## 2022-02-27 NOTE — PROVIDER NOTIFICATION
"Nuclear med called and asked if scan needs to be done STAT- if so would have to call in nuclear med to get it done. Paged sent to hospitalist for clarification if test needs to be done STAT.     Dr Meza paged: \"Dr. Dias ordered nuclear med lung scan STAT  still not done. Would need to have to call in nuclear med to get done. Does this need to be done stat or can you change order to routine?\"     Update: Derrick changed order to routine, and plan to have scan done today.   "

## 2022-02-27 NOTE — PROGRESS NOTES
[facility]    Medicine Progress Note - Hospitalist Service    Date of Admission:  2/24/2022    Assessment & Plan            Irvin Bridges is a 89 year old male with history of dementia, CKD and aortic atherosclerosis who presents via EMS with shortness of breath.      Acute hypoxic respiratory failure due to acute systolic CHF with pulmonary edema  Probable type II NSTEMI  Patient reportedly with acute dyspnea x 1 day without chest pain, palpitations, cough, sputum.  Recently increased PTA lasix due to LE edema (followed in [facility]).  No reported hx of CAD or CHF.  EMS reports initial O2 sats 76%, placed on Bipap following arrival.  CXR consistent with acute pulmonary edema, also noted diffuse bilateral interstitial opacities, BNP 35K.  Serial trop mildly elevated but flat, EKG without acute ST-T elevation, flipped T laterally.  TTE showing EF 35-40%, grade 1 diastolic dysfunction, global LV hypokinesia and severe inferior wall hypokinesis (prior TTE 12/2011 with normal LV function).  No leukocytosis, afebrile with low procal.  Covid NEG.     -net negative 1.8L in past 24 hours with weight down 3 pounds; continue lasix 40 mg IV bid  -continue PTA carvedilol  -initiated hydralazine and Imdur; amlodipine decreased 2/25  - Cards recs appreciated, currently planning for conservative management  -stable on 10L oxygen; wean as able  -D-dimer elevated; V/Q scan 2/27 with low probability for PE; lower extremity US 2/26 negative for DVT  -repeat CXR to assess for effusions that require drainage as unable to significantly wean oxygen  - encourage IS and acapella, get up to chair    Hypokalemia  Due to diuresis.   - give 40 mEq KCl x1 and repeat level at 1400  - no protocol due to renal function    Chronic macrocytic anemia  Baseline hgb 8-9, admission 8.0.  No S/S active bleeding.  Iron studies do not suggest deficiency, folate wnl.  B12 level 2/8/22 wnl.    -Continue PTA B12  -hgb stable about 7  g/dL; will transfuse if <7 or if Cardiology feels should keep >8 given CHF (wife consented via phone)    CKD, baseline CR 2.2. admssion 2.29.     [facility]ed by [facility].  Bladder scan negative in ED  -Avoid nephrotoxin  -follow BMP with diuresis, Cr remains stable 2/27    Hypertension  -antihypertensives as above    Urinary retention  King placed 2/26 due to retention.   - started flomax 2/26  - plan for TOV prior to discharge    Chronic low back pain  No acute change.   -continue PTA tylenol tid  -PT as able         Diet: Combination Diet Low Saturated Fat Na <2400mg Diet, No Caffeine Diet    DVT Prophylaxis: Pneumatic Compression Devices  King Catheter: PRESENT, indication: Retention  Central Lines: None  Cardiac Monitoring: ACTIVE order. Indication: Acute decompensated heart failure (48 hours)  Code Status:   DNI    Disposition Plan   Expected Discharge: 03/01/2022     Anticipated discharge location:  Awaiting care coordination huddle  Delays:            The patient's care was discussed with the Bedside Nurse, Patient and Patient's Family.    Dl Dias MD  Hospitalist Service  [facility]  Securely message with the Vocera Web Console (learn more here)  Text page via [employer] Paging/Directory         Clinically Significant Risk Factors Present on Admission                  ______________________________________________________________________    Interval History   He is feeling well, denies any dyspnea, chest pain/pressure, fever/chills, cough.        Data reviewed today: I reviewed all medications, new labs and imaging results over the last 24 hours. I personally reviewed no images or EKG's today.    Physical Exam   Vital Signs: Temp: 97.8  F (36.6  C) Temp src: Axillary BP: 130/66 Pulse: 100   Resp: 16 SpO2: 92 % O2 Device: Oxymask Oxygen Delivery: 10 LPM  Weight: 139 lbs 0 oz     General Appearance: Well nourished male in NAD  Respiratory: diminished basilar lung sounds,  no wheezing or tachypnea   Cardiovascular: RRR, normal s1/s2 with grade 2/6 systolic murmur  GI: abdomen soft, normal bowel sounds  Skin: 1+ sacral edema  Other: Alert and appropriate, cranial nerves grossly intact      Data   Recent Labs   Lab 02/27/22  0523 02/26/22  1755 02/26/22  0505 02/25/22  0510 02/24/22  1748   WBC 9.5  --  8.8 9.7 9.3   HGB 7.1* 7.3* 7.0* 7.8* 8.0*   *  --  109* 107* 107*     --  242 269 268     --  138 140 139   POTASSIUM 3.1*  --  3.6 4.1 4.9   CHLORIDE 104  --  108 114* 113*   CO2 23  --  21 21 19*   BUN 49*  --  52* 45* 45*   CR 2.41*  --  2.58* 2.37* 2.29*   ANIONGAP 7  --  9 5 7   ABHI 8.0*  --  8.5 8.9 9.3   *  --  125* 125* 127*   ALBUMIN  --   --   --   --  3.0*   PROTTOTAL  --   --   --   --  6.8   BILITOTAL  --   --   --   --  0.3   ALKPHOS  --   --   --   --  79   ALT  --   --   --   --  21   AST  --   --   --   --  18

## 2022-02-27 NOTE — PLAN OF CARE
Pt is A&Ox1-2, disoriented to situation and time. VSS ex on 10L oxymask. LS dim with fine crackles in RLL, RAMON. BS+ active, flatus+. King in place, intact and patent-Adequate UOP. Blanchable redness to coccyx, pt shifts weight on own at times, but also is turn and repo. Pt is assistx2, on low sat fat diet. Scheduled tylenol for back pain. Can be impulsive at times, requires freuqent [id number]-[id number] long arm. Tele: SR with ST depression. [facility] plan for day nurse to call nuclear med to schedule test.

## 2022-02-27 NOTE — PROGRESS NOTES
02/27/22 1558   Quick Adds   Type of Visit Initial PT Evaluation   Living Environment   People in Home spouse   Current Living Arrangements condominium   Home Accessibility no concerns   Transportation Anticipated family or friend will provide   Living Environment Comments pt lives with spouse in condo with elevators. Spouse does cooking/cleaning, pt doesn't drive, family also assists with driving.   Self-Care   Usual Activity Tolerance moderate   Current Activity Tolerance poor   Regular Exercise No   Equipment Currently Used at Home walker, rolling   Fall history within last six months no   Activity/Exercise/Self-Care Comment pt amb with wh walker at home   General Information   Onset of Illness/Injury or Date of Surgery 02/24/22   Referring Physician Dr Dias   Patient/Family Therapy Goals Statement (PT) home when able   Pertinent History of Current Problem (include personal factors and/or comorbidities that impact the POC) pt admitted with SOB, O2 sats 76%. VQ scan last night with low probability of PE. PMH includes dementia, CKD, aortic atherosclerosis.   Existing Precautions/Restrictions fall   Cognition   Affect/Mental Status (Cognition) confused   Orientation Status (Cognition) oriented to;person;place  (knew month and year)   Cognitive Status Comments pt has dementia diagnosis   Pain Assessment   Patient Currently in Pain No   Integumentary/Edema   Integumentary/Edema no deficits were identifed   Posture    Posture Forward head position   Range of Motion (ROM)   Range of Motion ROM is WFL   Strength (Manual Muscle Testing)   Strength (Manual Muscle Testing) Deficits observed during functional mobility   Bed Mobility   Comment, (Bed Mobility) mod assist 1   Transfers   Comment, (Transfers) mod assist 1 sit to stand   Gait/Stairs (Locomotion)   [place] Level (Gait) minimum assist (75% patient effort)   Assistive Device (Gait) walker, front-wheeled   Distance in Feet (Required for LE Total Joints) 5'    Pattern (Gait) step-to   Comment, (Gait/Stairs) unsteady gait, pt desats with activity - on 8L oxymizer, desats from 94% at rest to 86%    Balance   Balance Comments decreased balance in standing   Sensory Examination   Sensory Perception patient reports no sensory changes   Coordination   Coordination no deficits were identified   Muscle Tone   Muscle Tone no deficits were identified   Clinical Impression   Criteria for Skilled Therapeutic Intervention Yes, treatment indicated   PT Diagnosis (PT) impaired functional mobility   Influenced by the following impairments decreased strength, decreased balance, decreased endurance   Functional limitations due to impairments fall risk, decreased IND with mobility, inability to amb household distances   Clinical Presentation (PT Evaluation Complexity) Stable/Uncomplicated   Clinical Presentation Rationale per medical record   Clinical Decision Making (Complexity) low complexity   Planned Therapy Interventions (PT) balance training;gait training;transfer training;strengthening   Anticipated Equipment Needs at Discharge (PT)   (pt has walker)   Risk & Benefits of therapy have been explained evaluation/treatment results reviewed   PT Discharge Planning   PT Discharge Recommendation (DC Rec) Transitional Care Facility   PT Rationale for DC Rec Pt is below baseline for mobility. Normally lives in [facility] with his wife, ambulates with wh walker, spouse has cleaning service, does cooking etc. Pt amb with wh walker and moves quite well at baseline per spouse, but does have dementia. Currently, pt is deconditioned and weak, limited by SOB/oxygen needs. Amb 10' with wh walker and min assist 2    PT Brief overview of current status transfers with min/mod assist 1-2, amb 10' with wh walker and min assist 2   Total Evaluation Time   Total Evaluation Time (Minutes) 15

## 2022-02-27 NOTE — PROGRESS NOTES
[facility]  Cardiology Consultation     Date of Admission:  2/24/2022  Reason for Consult: CHF    Primary Cardiologist: no previous cardiology care    History of Present Illness   Irvin Bridges is a 89 year old male who presents with progressive shortness of breath, peripheral edema, and weight gain.  He was evaluated by his PCP 1 week ago at which time he only had reports of worsening peripheral edema.  His Lasix was increased from 10 mg to 20 mg daily.  He has no known history of cardiac history.  During his PCP visit he was also referred to nephrology for worsening renal function as well as neurology for progressive cognitive issues and signs of dementia.    Irvin unfortunately had significant shortness of breath, orthopnea, and abdominal distention yesterday prompting him to be seen in the emergency department.  His past medical history is notable for CKD (creatinine baseline around 2; he was recently referred to nephrology), chronic anemia (lab flowsheet shows hemoglobin baseline between 8 and 9; no signs of active bleeding; suspect anemia of chronic disease), cognitive impairment (recent referral to neurology for formal evaluation), hypertension, significant osteoarthritis/back pain, and chronic peripheral edema (previously he has been on Lasix 10 mg daily).    On this admission, he was initiated on IV diuresis as he had evidence of significant hypervolemia with chest x-ray showing vascular congestion and bilateral pleural effusions and his NT proBNP being significantly elevated.  There was some question of superimposed infection but he has been afebrile and his CBC shows no evidence of leukocytosis.  Procalcitonin was elevated but this is in the low normal range.  Covid has been negative.  Primary service has felt that there is no infectious process.  He was set up for a baseline echocardiogram on this admission.  This showed LVEF reduction at 35 to 40% with severe inferior wall  hypokinesis.  His ECG also showed ST depressions in the lateral leads.  Given these new findings cardiology was consulted for further recommendations.  Of note, patient is DNI.    -------------------------------------------------------------------------------------------    Assessment:  Irvin Bridges is a 89 year old male who was admitted on 2/24/2022 progressive shortness of breath, orthopnea, and peripheral edema.    # Acute HFrEF  --Chest x-ray shows congestion and bilateral pleural effusions, NT proBNP is significantly elevated  --Initiated on IV Lasix 40 mg twice daily with good response  --Currently on oxygen mask    # Systolic cardiomyopathy  --Echocardiogram on current admission showed LVEF of 35 to 40%  --Unclear if this is a new finding; his last echocardiogram was in 2011 which showed preserved LVEF  --Not on ACE due to history of CKD; PTA regimen already includes beta-blocker    #Trivial troponin elevation  --Troponin rise to 300 with no delta change  --ECG shows new ST depressions laterally  --Echocardiogram with inferior wall hypokinesis   --Poor historian; unclear if he has chest discomfort    #Dementia  --Prior providers, Had a discussion with his spouse, Melissa, over the phone who reports that patient has significant cognitive impairment and was recently referred to neurology for formal evaluation    #Hypertension  #CKD, creatinine baseline 2  #Chronic anemia, hemoglobin baseline 8-9 likely due to chronic disease  #DNI    -------------------------------------------------------------------------------------------    Plan:  Interval history:  Low probability for PE on V/Q scan. No other changes in cardiac plan today.      --Continue medical therapy at this time, Dr. Kang discussed with family and we are deferring invasive testing for now.  We will allow more time for family to discuss and finalize plans on Monday (such as should we do an angiogram to eval his cardiomyopathy etc.)  --We will  continue with IV diuresis (IV lasix 40 mg BID) as he is making good progress  --He will continue with beta-blocker  --We will avoid ACE inhibitor; Recommened hydralazine/Imdur  --Cardiology will continue to follow  -------------------------------------------------------------------------------------------    Code Status    Special Code    Constitutional: NAD. Comfortable at rest.  Oxygen supplementation with oxymask. He is disoriented but alert.  Eyes: Pupils equal and round, conjunctivae and lids unremarkable, sclera white, no xanthalasma,   ENT: Satisfactory dentition.  No cyanosis or pallor.  Neck: No thyromegaly.     Respiratory: Diminished at the bases bilaterally.  Cardiovascular: Elevated JVP at 45 degree angle.  Regular rate and rhythm with no murmur rub or gallop.  GI: Soft, nontender, no hepatosplenomegaly, no masses, active bowel sounds.    Skin: No rash, erythema, ecchymosis.  Musculoskeletal: Normal muscle tone and strength. Normal gait. No spinal deformities.  Neuropsychiatric: Oriented to person but not to time or place.  His affect normal.  No gross motor deficits.  Extremities: 1+ pitting edema bilaterally.  Vascular: 2+ DP and radial pulses bilaterally.    Data   Reviewed.

## 2022-02-28 NOTE — CONSULTS
Care Management Initial Consult    General Information  Assessment completed with: Patient, Spouse or significant other, patient and spouse Melissa  Type of CM/SW Visit: Initial Assessment    Primary Care Provider verified and updated as needed: Yes (Dr Drew from [facility])   Readmission within the last 30 days: no previous admission in last 30 days      Reason for Consult: discharge planning  Advance Care Planning:            Communication Assessment  Patient's communication style: spoken language (English or Bilingual)    Hearing Difficulty or Deaf: no   Wear Glasses or Blind: yes    Cognitive  Cognitive/Neuro/Behavioral: .WDL except  Level of Consciousness: alert, confused  Arousal Level: opens eyes spontaneously  Orientation: disoriented to, time, place, situation  Mood/Behavior: restless, cooperative  Best Language: 0 - No aphasia  Speech: clear, spontaneous    Living Environment:   People in home: spouse  Melissa   Current living Arrangements: condominium      Able to return to prior arrangements: yes  Living Arrangement Comments: Patient     Family/Social Support:  Care provided by: spouse/significant other  Provides care for: no one, unable/limited ability to care for self  Marital Status:   Wife, Children  Melissa       Description of Support System: Supportive, Involved    Support Assessment: Adequate family and caregiver support, Adequate social supports    Current Resources:   Patient receiving home care services:       Community Resources:    Equipment currently used at home: walker, rolling  Supplies currently used at home:      Employment/Financial:  Employment Status:          Financial Concerns: No concerns identified   Referral to Financial Worker: No       Lifestyle & Psychosocial Needs:  Social Determinants of Health     Tobacco Use: Not on file   Alcohol Use: Not on file   Financial Resource Strain: Not on file   Food Insecurity: Not on file   Transportation Needs: Not on file    Physical Activity: Not on file   Stress: Not on file   Social Connections: Not on file   Intimate Partner Violence: Not on file   Depression: Not on file   Housing Stability: Not on file       Functional Status:  Prior to admission patient needed assistance:              Mental Health Status:          Chemical Dependency Status:                Values/Beliefs:  Spiritual, Cultural Beliefs, Restorationism Practices, Values that affect care:                 Additional Information:  Writer received consult for discharge planning. Patient is a 89 year old male who admitted on 2/24 after a fall. PT/OT are recommending TCU at discharge. Writer met with patient and spouse Melissa to discuss recommendations. Patient currently lives at home with his wife and his wife is his primary caregiver. PCP updated on face sheet. Patients wife is in agreement with TCU and would like referrals sent to [facility], [facility] and [facility]. Writer faxed referrals. At time of discharge patient will need stretcher transport due to impulsivity and oxygen needs. Patients spouse is aware and in agreement with the costs.     DAVID Rodrigez

## 2022-02-28 NOTE — PLAN OF CARE
RN: pt oriented to self, usually oriented to place, needs frequent re-orientation to time/situation.  Exceptionally pleasant and cooperative, however forgetful and needs need to be anticipated (did not use call light; did set off chair alarm this shift & was attempting to get up unattended--redirected well).  Lungs clear, diminished.  Uses incentive spirometer frequently and independently when left within reach.  Oxygen weaned from 8L oximizer to 4L nasal cannula this shift.  Lasix given as ordered; toussaint patent, draining, diuresing well.  Spouse visited; see SW note for discharge planning / referrals to TCU.

## 2022-02-28 NOTE — PROGRESS NOTES
"   02/28/22 0842   Quick Adds   Type of Visit Initial Occupational Therapy Evaluation   Living Environment   People in Home spouse   Current Living Arrangements condominium   Home Accessibility no concerns   Living Environment Comments info from PT eval, pt unable to recall any info. states \"I live here.\" refering to the hospital. lives with spouse in condo with elevators. spouse manages IADL's. unsure how much help spouse was helping with ADL's   Self-Care   Usual Activity Tolerance moderate   Current Activity Tolerance poor   Regular Exercise No   Equipment Currently Used at Home walker, rolling   Activity/Exercise/Self-Care Comment will have to gather PLOF when family is available.    General Information   Onset of Illness/Injury or Date of Surgery 02/24/22   Referring Physician Dl Dias MD   Patient/Family Therapy Goal Statement (OT) none stated   Additional Occupational Profile Info/Pertinent History of Current Problem pt admitted with SOB, O2 sats 76%. VQ scan last night with low probability of PE. PMH includes dementia, CKD, aortic atherosclerosis.   Existing Precautions/Restrictions fall   Limitations/Impairments safety/cognitive   General Observations and Info pt supine in bed, agreeable to OT eval.    Cognitive Status Examination   Orientation Status person   Affect/Mental Status (Cognitive) confused   Follows Commands increased processing time needed;follows one-step commands;over 90% accuracy;repetition of directions required;physical/tactile prompts required   Safety Deficit impulsivity;insight into deficits/self-awareness   Memory Deficit short-term memory;severe deficit   Cognitive Status Comments notes indicate pt was refered to neurology due to recent cog decline.    Visual Perception   Visual Impairment/Limitations corrective lenses full-time   Impact of Vision Impairment on Function (Vision) pt complaining it feels like he has something in his R eye. looks red and a little discharge in corner " "  Sensory   Sensory Quick Adds No deficits were identified   Pain Assessment   Patient Currently in Pain No   Integumentary/Edema   Integumentary/Edema no deficits were identifed   Range of Motion Comprehensive   General Range of Motion no range of motion deficits identified   Comment, General Range of Motion pt does complain that his L shoulder hurts with shoulder flexion   Strength Comprehensive (MMT)   General Manual Muscle Testing (MMT) Assessment no strength deficits identified   Comment, General Manual Muscle Testing (MMT) Assessment overall functional with activity   Muscle Tone Assessment   Muscle Tone Quick Adds No deficits were identified   Coordination   Upper Extremity Coordination No deficits were identified   Bed Mobility   Comment (Bed Mobility) min A with supine to sit   Transfers   Transfer Comments impulsive   Balance   Balance Comments decreased balance   Clinical Impression   Criteria for Skilled Therapeutic Interventions Met (OT) Yes, treatment indicated   OT Diagnosis decreased I with ADL\"s and functional mobility   Influenced by the following impairments decreased strength, cognition, and functional transfers   OT Problem List-Impairments impacting ADL problems related to;activity tolerance impaired;balance;cognition;strength;pain   Assessment of Occupational Performance 1-3 Performance Deficits   Identified Performance Deficits decreased dressing, BR transfers   Planned Therapy Interventions (OT) ADL retraining;balance training;bed mobility training;cognition;strengthening;transfer training;progressive activity/exercise   Clinical Decision Making Complexity (OT) low complexity   Risk & Benefits of therapy have been explained evaluation/treatment results reviewed;care plan/treatment goals reviewed;risks/benefits reviewed;current/potential barriers reviewed;participants voiced agreement with care plan;participants included;patient   OT Discharge Planning   OT Discharge Recommendation (DC Rec) " Transitional Care Facility   OT Rationale for DC Rec pt demo's weakness and increased need for A with transfers. would benefit from TCU stay prior to return home   Total Evaluation Time (Minutes)   Total Evaluation Time (Minutes) 10   OT Goals   Therapy Frequency (OT) 3 times/wk   OT Predicated Duration/Target Date for Goal Attainment 03/07/22   OT Goals Hygiene/Grooming;Lower Body Dressing;Upper Body Dressing;Toilet Transfer/Toileting   OT: Hygiene/Grooming supervision/stand-by assist;while standing   OT: Upper Body Dressing Supervision/stand-by assist   OT: Lower Body Dressing Supervision/stand-by assist   OT: Toilet Transfer/Toileting Supervision/stand-by assist;cleaning and garment management;toilet transfer

## 2022-02-28 NOTE — PROVIDER NOTIFICATION
"Dr. Meza paged: \"Pt has order for Aspen collar, CT of cervical spine and head with no acute findings- does pt still need to wear collar?\"     Update: Keep collar on until neurology sees pt.   "

## 2022-02-28 NOTE — CONSULTS
"[facility]    Neurosurgery Consultation     Date of Admission:  2/24/2022  Date of Consult (When I saw the patient): 02/28/22    Assessment & Plan   Irvin Bridges is a 89 year old male who was admitted on 2/24/2022. I was asked to see the patient for \"clinically clear c spines in a demented pt who had unwitnessed fall.\"    Active Problems:   Unwitnessed fall    Assessment: Head CT and cervical CT without acute findings    Plan:   -Ok to discontinue cervical collar  -No follow up needed  -Neurosurgery will sign off    I have discussed the following assessment and plan with Dr. Blackwell who is in agreement with initial plan and will follow up with further consultation recommendations.    Nelia Portillo CNP  [facility] Neurosurgery  [address] [address]    Tel [phone]  Pager [phone]        Code Status    Special Code    Reason for Consult   Reason for consult: I was asked by Dayami Mittal CNP to evaluate this patient for  \"clinically clear c spines in a demented pt who had unwitnessed fall.\"    Primary Care Physician   Physician No Ref-Primary    Chief Complaint   Unwitnessed fall    History is obtained from the patient, nurse, and EMR.    History of Present Illness   Irvin Bridges is a 89 year old male who was admitted 2/24/2022 for acute respiratory failure suspected pulmonary edema and type II non-STEMI. Per notes, the bed alarm was sounding and staff found him in a sitting position leaning near his bed. A head CT was negative for acute findings and a cervical CT was negative for fracture. Today he was seen sitting in the chair. He denies headache, neck pain, paresthesias, and new weakness. He has not been wearing the cervical collar that was placed on him last evening. ROM intact without pain.    Past Medical History   I have reviewed this patient's medical history and updated it with pertinent information " if needed.   No past medical history on file.    Past Surgical History   I have reviewed this patient's surgical history and updated it with pertinent information if needed.  No past surgical history on file.    Prior to Admission Medications   Prior to Admission Medications   Prescriptions Last Dose Informant Patient Reported? Taking?   acetaminophen (TYLENOL) 500 MG tablet 2/24/2022 at Unknown time  Yes Yes   Sig: Take 1,000 mg by mouth 3 times daily    amLODIPine (NORVASC) 10 MG tablet 2/24/2022 at Unknown time  Yes Yes   Sig: Take 10 mg by mouth daily   calcium carbonate (TUMS) 500 MG chewable tablet  at prn  Yes Yes   Sig: Take 1 chew tab by mouth 2 times daily as needed for heartburn   carvedilol (COREG) 12.5 MG tablet 2/24/2022 at x1  Yes Yes   Sig: Take 12.5 mg by mouth 2 times daily   escitalopram (LEXAPRO) 5 MG tablet   Yes Yes   Sig: Take 5 mg by mouth daily   furosemide (LASIX) 20 MG tablet 2/24/2022 at Unknown time  Yes Yes   Sig: Take 20 mg by mouth daily   polyethylene glycol-propylene glycol (SYSTANE ULTRA) 0.4-0.3 % SOLN ophthalmic solution  at prn  Yes Yes   Sig: Place 1 drop into both eyes every hour as needed for dry eyes   potassium chloride ER (KLOR-CON M) 20 MEQ CR tablet 2/24/2022 at x1  Yes Yes   Sig: Take 20 mEq by mouth 2 times daily   vitamin B-12 (CYANOCOBALAMIN) 1000 MCG tablet 2/24/2022 at Unknown time  Yes Yes   Sig: Take 1 tablet by mouth daily      Facility-Administered Medications: None     Allergies   Allergies   Allergen Reactions     Lactose Unknown     Memantine Other (See Comments)     Ampicillin Other (See Comments) and Unknown     Unknown, states he hasn't had it in 40 yrs         Rosuvastatin Other (See Comments) and Unknown     myositis           Social History   I have reviewed this patient's social history and updated it with pertinent information if needed. Irvin Bridges      Family History   I have reviewed this patient's family history and updated it with pertinent  "information if needed.   No family history on file.    Review of Systems   10 point ROS negative except noted above.    Physical Exam   Temp: 98.1  F (36.7  C) Temp src: Axillary BP: 132/72 Pulse: 61   Resp: 18 SpO2: 98 % O2 Device: Oxymask Oxygen Delivery: 8 LPM  Vital Signs with Ranges  Temp:  [97.2  F (36.2  C)-98.1  F (36.7  C)] 98.1  F (36.7  C)  Pulse:  [54-95] 61  Resp:  [16-18] 18  BP: (112-132)/(65-78) 132/72  SpO2:  [94 %-98 %] 98 %  134 lbs 0 oz     , Blood pressure 132/72, pulse 61, temperature 98.1  F (36.7  C), temperature source Axillary, resp. rate 18, height 5' 5\" (1.651 m), weight 134 lb (60.8 kg), SpO2 98 %.  134 lbs 0 oz  HEENT:  Normocephalic, atraumatic.  PERRLA.    Heart:  No peripheral edema  Lungs:  No SOB  Skin:  Warm and dry, good capillary refill.  Extremities:  Good radial and dorsalis pedis pulses bilaterally, no edema, cyanosis or clubbing.    NEUROLOGICAL EXAMINATION:   Mental status:  Alert follows commands.  Motor:  Strength is 5/5 throughout the upper and lower extremities  Sensation:  intact    Cervical examination reveals good range of motion.  No tenderness to palpation of the cervical spine or paraspinous muscles bilaterally.     Data     CBC RESULTS:   Recent Labs   Lab Test 02/28/22  0524   WBC 7.6   RBC 2.14*   HGB 7.1*   HCT 22.5*   *   MCH 33.2*   MCHC 31.6   RDW 15.4*        Basic Metabolic Panel:  Lab Results   Component Value Date     02/28/2022      Lab Results   Component Value Date    POTASSIUM 3.3 02/28/2022     Lab Results   Component Value Date    CHLORIDE 104 02/28/2022     Lab Results   Component Value Date    ABHI 8.0 02/28/2022     Lab Results   Component Value Date    CO2 24 02/28/2022     Lab Results   Component Value Date    BUN 49 02/28/2022     Lab Results   Component Value Date    CR 2.33 02/28/2022     Lab Results   Component Value Date     02/28/2022     INR:  No results found for: INR      "

## 2022-02-28 NOTE — PROVIDER NOTIFICATION
"Dr. Meza paged: \"FYI pt will not keep C-collar on, collar is causing him to be more agitated and not sleep.\"     Update: Provided placed order for one time dose of IV ativan 0.5-1mg.   "

## 2022-02-28 NOTE — PROGRESS NOTES
[facility]    Medicine Progress Note - Hospitalist Service    Date of Admission:  2/24/2022    Assessment & Plan            Irvin Bridges is a 89 year old male with history of dementia, CKD and aortic atherosclerosis who presents via EMS with shortness of breath.      Acute hypoxic respiratory failure due to acute systolic CHF with pulmonary edema  Probable type II NSTEMI  Patient reportedly with acute dyspnea x 1 day without chest pain, palpitations, cough, sputum.  Recently increased PTA lasix due to LE edema (followed in [facility]).  No reported hx of CAD or CHF.  EMS reports initial O2 sats 76%, placed on Bipap following arrival.    * CXR consistent with acute pulmonary edema, also noted diffuse bilateral interstitial opacities, BNP 35K.    * Serial trop mildly elevated but flat, EKG without acute ST-T elevation, flipped T laterally.  TTE showing EF 35-40%, grade 1 diastolic dysfunction, global LV hypokinesia and severe inferior wall hypokinesis (prior TTE 12/2011 with normal LV function).    * No leukocytosis, afebrile with low procal.  Covid NEG.   * V/Q scan 2/27 with low probability of PE (obtained due to persistent hypoxia with rather minimal overload)  * repeat CXR 2/27 with persisting pulmonary edema    -net negative 1.1L in past 24 hours, weight down 11 pounds since admission  -transitioned to PO lasix 2/28 per Cards   -continue PTA carvedilol  -initiated hydralazine and Imdur; amlodipine decreased 2/25  -Cards recs appreciated, currently planning for conservative management  - encourage IS and acapella, get up to chair  - weaned to 4L oxygen 2/28    Hypokalemia  Due to diuresis.   - repeat 40 mEq KCl x1 today  - no protocol due to renal function    Unwitnessed fall  Found on floor evening of 2/27.  CT head and CT cervical spine negative.  - cleared by Neurosurgery and aspen collar removed 2/28    Chronic macrocytic anemia  Baseline hgb 8-9, admission 8.0.  No S/S active  bleeding.  Iron studies do not suggest deficiency, folate wnl.  B12 level 2/8/22 wnl.    -Continue PTA B12  -hgb stable about 7 g/dL; will transfuse if <7 or if Cardiology feels should keep >8 given CHF (wife consented via phone)    CKD, baseline CR 2.2. admssion 2.29.     Followed by [facility] [place].  Bladder scan negative in ED  -Avoid nephrotoxin  -follow BMP with diuresis, Cr remains stable 2/28    Urinary retention  King placed 2/26 due to retention.   - started flomax 2/26  - plan for TOV prior to discharge - likely tomorrow; currently monitoring I/O's    Hypertension  -antihypertensives as above    Chronic low back pain  No acute change.   -continue PTA tylenol tid         Diet: Combination Diet Low Saturated Fat Na <2400mg Diet, No Caffeine Diet  Room Service    DVT Prophylaxis: Heparin subcutaneous   King Catheter: PRESENT, indication: Retention  Central Lines: None  Cardiac Monitoring: ACTIVE order. Indication: Acute decompensated heart failure (48 hours)  Code Status:   DNI    Disposition Plan   Expected Discharge: TCU in 1-2 days pending ongoing improvement in hypoxia     Anticipated discharge location:  Awaiting care coordination huddle  Delays:            The patient's care was discussed with the Bedside Nurse, Patient and Patient's Family.    Dl Dias MD  Hospitalist Service  [facility]  Securely message with the Vocera Web Console (learn more here)  Text page via [employer] Paging/Directory         Clinically Significant Risk Factors Present on Admission                  ______________________________________________________________________    Interval History   He reports feeling no better but no worse.  No change in shortness of breath.  Denies any chest pain/pressure.  Always feels cold.  No cough.      Wife updated at bedside.  No concerns per RN.        Data reviewed today: I reviewed all medications, new labs and imaging results over the last 24 hours. I  personally reviewed no images or EKG's today.    Physical Exam   Vital Signs: Temp: 97.4  F (36.3  C) Temp src: Oral BP: 108/59 Pulse: 77   Resp: 16 SpO2: 98 % O2 Device: Oxymizer cannula Oxygen Delivery: 8 LPM  Weight: 134 lbs 0 oz     General Appearance: Well nourished male in NAD  Respiratory: diminished basilar lung sounds (L>R), no wheezing or tachypnea   Cardiovascular: RRR, normal s1/s2 with grade 2/6 systolic murmur  GI: abdomen soft, normal bowel sounds  Skin: trace sacral edema  Other: Alert and appropriate, cranial nerves grossly intact      Data   Recent Labs   Lab 02/28/22  0524 02/27/22  1817 02/27/22  1608 02/27/22  0523 02/26/22  1755 02/26/22  0505 02/25/22  0510 02/24/22  1748   WBC 7.6  --   --  9.5  --  8.8   < > 9.3   HGB 7.1*  --   --  7.1* 7.3* 7.0*   < > 8.0*   *  --   --  107*  --  109*   < > 107*     --   --  215  --  242   < > 268     --   --  134  --  138   < > 139   POTASSIUM 3.3*  --  3.7 3.1*  --  3.6   < > 4.9   CHLORIDE 104  --   --  104  --  108   < > 113*   CO2 24  --   --  23  --  21   < > 19*   BUN 49*  --   --  49*  --  52*   < > 45*   CR 2.33*  --   --  2.41*  --  2.58*   < > 2.29*   ANIONGAP 7  --   --  7  --  9   < > 7   ABHI 8.0*  --   --  8.0*  --  8.5   < > 9.3   * 122*  --  112*  --  125*   < > 127*   ALBUMIN  --   --   --   --   --   --   --  3.0*   PROTTOTAL  --   --   --   --   --   --   --  6.8   BILITOTAL  --   --   --   --   --   --   --  0.3   ALKPHOS  --   --   --   --   --   --   --  79   ALT  --   --   --   --   --   --   --  21   AST  --   --   --   --   --   --   --  18    < > = values in this interval not displayed.

## 2022-02-28 NOTE — PROVIDER NOTIFICATION
"Dr. Meza paged: \" Pt..'s potassium came back at 3.3, do you want him on replacement protocol?\"  "

## 2022-02-28 NOTE — PLAN OF CARE
Goal Outcome Evaluation:  Pt is A&Ox1-2, disoriented to situation and time. VSS ex on 8L via oxymask. Can be impulsive at times, requires freuqent mn-ibmpmhvcwcc-cgd long arm. LS dim, RAMON, BS+ active, flatus+. King in place, intact and patent-Adequate UOP. Blanchable redness to coccyx mepilex in place. +2 edema to BLE. Scheduled tylenol for back pain. Pt assistx1-2, on cardiac diet. Tele: SR with T-wave depression. CT of cervical spine and CT of head completed this shift, both with no acute findings. X-ray of L hip completed as well, no acute Fx found. Neurology consulted- plan to have them see pt today. Cervical collar in place. 1x dose of ativan given for pt to keep C-collar on until neurology assesses pt.

## 2022-02-28 NOTE — PROGRESS NOTES
[facility]  Cardiology Progress Note    Date of Service (when I saw the patient): 02/28/2022   Primary Cardiologist : Dr. Gray      Assessment & Plan   Irvin CONWAY Yuliana is a 89 year old male who was admitted on 2/24/2022. He carries a PMH significant for CKD, chronic anemia, hypertension, cognitive impairment, chronic peripheral edema, and osteoarthritis/back pain.     1.  : Acute systolic HFrEF / Mild to mod Aortic Stenosis   - NT pro BNP > 35,000 upon admission   - Echocardiogram showed moderate global hypokinesia of the LV, EF 35-40%, severe inferior wall hypokinesis, grade 1 diastolic dysfunction and mild to moderate aortic stenosis.   - Diuresing well on IV lasix  - Net neg 1.1 L overnight, down 5 kg since admission  - Transition to PO lasix 40 mg daily  - PTA carvedilol  - Hydralazine/ Imdur  - Strict I/O's  - daily weight    2.  : Troponin elevation   - HS troponin 396  - Admission EKG shows new ST depression laterally   - Tele confirms NSR w/ ST depression   - Chest pain free   - Medical management for now due to anemia and kidney insufficiency.  - May consider coronary angiogram in the future if patient/family agreeable.     3.  : Hypertension   - Borderline soft    4. CKD   - creatinine down trending 2.33 ( baseline 2.0)  - No ACE/ARB    5. Anemia   - 7.1 today ( Baseline 8-9) .  - Management per medicine team.   6. Dementia         I spent > 15 minutes face-to-face and/or coordinating care. Over 50% of our time on the unit was spent counseling the patient and/or coordinating care.    Marie Ureña, APRN CNP  Text Page  (M-F, 7:30 am - 4:00 pm)    Interval History   Up in chair on HFNC. Breathing better. Still short with exertion. Denies chest pain, palpitations, PND, orthopnea, presyncope or LE edema. Diuresed 5 kg since admission, transition to PO diuretics. Creatinine down trending. Anemia stable.     Physical Exam   Temp: 97.4  F (36.3  C) Temp src: Oral BP: 108/59 Pulse: 77    Resp: 16 SpO2: 98 % O2 Device: Oxygen palma Oxygen Delivery: 8 LPM  Vitals:    02/26/22 0645 02/27/22 0605 02/28/22 0533   Weight: 64.6 kg (142 lb 6.4 oz) 63 kg (139 lb) 60.8 kg (134 lb)     Vital Signs with Ranges  Temp:  [97.4  F (36.3  C)-98.1  F (36.7  C)] 97.4  F (36.3  C)  Pulse:  [54-77] 77  Resp:  [16-18] 16  BP: (108-132)/(59-78) 108/59  SpO2:  [94 %-98 %] 98 %  I/O last 3 completed shifts:  In: 470 [P.O.:470]  Out: 1575 [Urine:1575]    GEN:  In general, this is a thin elderly male  in no acute distress. HEENT:  Pupils equal, round. Sclerae nonicteric. Clear oropharynx. Mucous membranes moist.  NECK: Supple, no masses appreciated. Trachea midline. Difficult to assess JVD   C/V:  Regular rate, tachycardic, no murmur, rub or gallop. No S3 or RV heave.   RESP: Respirations are unlabored. No use of accessory muscles. Decreased breath sounds to bilateral bases, fine crackles   GI: Abdomen soft, nontender, nondistended. No HSM appreciated.   EXTREM: No  LE edema. No cyanosis or clubbing.  NEURO: Alert and oriented, cooperative. Episodes of confusion   PSYCH: Normal affect.  SKIN: Warm and dry. No rashes or petechiae appreciated.       Medications       acetaminophen  1,000 mg Oral TID     amLODIPine  5 mg Oral Daily     carvedilol  12.5 mg Oral BID     cyanocobalamin  1,000 mcg Oral Daily     escitalopram  5 mg Oral Daily     [START ON 3/1/2022] furosemide  40 mg Oral Daily     hydrALAZINE  25 mg Oral Q8H LACY     isosorbide mononitrate  30 mg Oral Daily     polyethylene glycol  17 g Oral Daily     sodium chloride (PF)  3 mL Intracatheter Q8H     tamsulosin  0.4 mg Oral Daily       Data   Reviewed       Marie Ureña, TOM CNP 2/28/2022

## 2022-02-28 NOTE — CODE/RAPID RESPONSE
[facility]    RRT Note  2/27/2022   Time Called: 611pm    RRT called for: Unwitnessed fall    Assessment & Plan   IMPRESSION & PLAN:    Irvin Bridges is a 89 year old male w/ PMH of dementia, CKD, hypertension, urinary retention, chronic low back pain, aortic atherosclerosis who was admitted on 2/24/2022 for acute respiratory failure suspected pulmonary edema and type II non-STEMI    Patient was sitting in his chair, the bed alarm was sounding (but did not go to the Sonendo device) and staff responded to his calling out for help.  Patient was found in a sitting position leaning near the bed.  He was awake and conscious when staff found him.  Patient reported to staff that he hit his head and the back of his head hurts.  I was called to evaluate the patient for post fall.    On my arrival patient is sitting up against the side of the bed, cervical collar has already been placed although he denies any neck pain.  Patient denies any dyspnea, chest pain, presyncope although he repeatedly asks where we are and informed me that the year was 1932.  I find him to be an unreliable historian.  He does not appear distressed however.  He tells me he was trying to get to dinner and landed on his butt.  He denies any shoulder pain, upper extremity paresthesias or motor deficits    VS: Heart rate 59, /65, SPO2 95% on 8 L    Impression  Unwitnessed fall patient with dementia who cannot reliably describe events before or after the fall    INTERVENTIONS:  -Glucose 122 mg/DL  -stat CT head  -stat CT c spine  -c collar already placed prior to my arrival.  Pt denies any UE paresthesias or motor deficit but he has dementia, is confused  -keep cervical collar in place overnight, change to [place] collar ASAP  -We will have neurosurgery see patient in consultation to review his ct images and clinically clear his C-spine accordingly  -I have called his wife and updated her on the events of this  evening  -X-ray of the pelvis to evaluate left hip pain    Working diagnosis: Unwitnessed fall, need to rule out intracranial and C-spine injury    At the end of the RRT patient was preparing to go to CT    disposition continue current level of care    Discussed with and defer further cares to night hospitalist NPs who will follow up with CT and x-ray results    Code Status: Special Code    Allergies   Allergies   Allergen Reactions     Lactose Unknown     Memantine Other (See Comments)     Ampicillin Other (See Comments) and Unknown     Unknown, states he hasn't had it in 40 yrs         Rosuvastatin Other (See Comments) and Unknown     myositis           Physical Exam   Vital Signs with Ranges:  Temp:  [97.2  F (36.2  C)-97.9  F (36.6  C)] 97.5  F (36.4  C)  Pulse:  [] 59  Resp:  [16-20] 18  BP: (112-130)/(62-78) 112/65  SpO2:  [92 %-98 %] 98 %  I/O last 3 completed shifts:  In: 600 [P.O.:600]  Out: 2925 [Urine:2925]    Constitutional: vs   General: Elderly pt sitting on the floor without acute distress  Neuro: Oriented to person, disoriented to time and place, +follows commands to  lateral upper extremities and bilateral lower extremities, face symmetric, tongue midline, speech fluent  Eyes defer  Head, ENT & mouth: Tender on palpation in the right posterior parietal region, no abrasions,  mouth moist oral mucosa  Neck: Not tender on palpation, cervical collar already in place prior to my arrival  CV defer  resp: Speaking in full sentences, no accessory muscle use   gi:defer  Ext: no edema  Skin: no rashes on exposed skin  Lymph: defer  Musculoskeletal no bony joint deformities pain on palpation of the left hip but able to pick both legs up off of the ground, full range of motion of bilateral upper extremities    Data       IMAGING: (X-ray/CT/MRI)   Recent Results (from the past 24 hour(s))   NM Lung Scan Perfusion Particulate    Narrative    EXAM: NM LUNG SCAN PERFUSION PARTICULATE  LOCATION: [facility]  DATE/TIME: 2/27/2022 10:15 AM    INDICATION: PE suspected, low/intermediate prob, positive D-dimer, shortness of breath  COMPARISON: Chest x-ray 02/24/2022  TECHNIQUE: 4 mCi technetium-99m MAA, IV. Standard lung perfusion imaging.    FINDINGS: Small perfusion defect right lower lung.      Impression    IMPRESSION:   1.  Low probability for pulmonary embolus.   XR Chest 2 Views    Narrative    EXAM: XR CHEST 2 VIEW  LOCATION: [facility]  DATE/TIME: 02/27/2022, 1:53 PM    INDICATION: Inability to wean oxygen, reassess infiltrate and edema.  COMPARISON: Chest x-ray 02/24/2022.      Impression    IMPRESSION: Bilateral pulmonary edema pattern appears mildly progressed. Bibasilar atelectasis or airspace disease is unchanged. Hypoinflated lungs. Stable mildly prominent cardiac silhouette.         CBC with Diff:  Recent Labs   Lab Test 02/27/22  0523   WBC 9.5   HGB 7.1*   *           Comprehensive Metabolic Panel:  Recent Labs   Lab 02/27/22  1817 02/27/22  1608 02/27/22  0523 02/26/22  0505 02/25/22  0510 02/24/22  1748   NA  --   --  134   < > 140 139   POTASSIUM  --  3.7 3.1*   < > 4.1 4.9   CHLORIDE  --   --  104   < > 114* 113*   CO2  --   --  23   < > 21 19*   ANIONGAP  --   --  7   < > 5 7   *  --  112*   < > 125* 127*   BUN  --   --  49*   < > 45* 45*   CR  --   --  2.41*   < > 2.37* 2.29*   GFRESTIMATED  --   --  25*   < > 26* 27*   ABHI  --   --  8.0*   < > 8.9 9.3   MAG  --   --   --   --  2.2  --    PROTTOTAL  --   --   --   --   --  6.8   ALBUMIN  --   --   --   --   --  3.0*   BILITOTAL  --   --   --   --   --  0.3   ALKPHOS  --   --   --   --   --  79   AST  --   --   --   --   --  18   ALT  --   --   --   --   --  21    < > = values in this interval not displayed.       Time Spent on this Encounter   I spent 19 minutes (614-633pm) on the unit/floor managing the care of Irvin Bridges. Over 50% of my time was spent counseling  the patient and/or coordinating care regarding services listed in this note.    Dayami Mittal [facility]  Hospitalist [place] KARMEN  [phone]

## 2022-02-28 NOTE — PLAN OF CARE
2/27 [id number]  Pt is A&Ox1-2, disoriented to situation and time. Needs frequent reorientation. LA in place. VSS ex on 8L oxymizer. Up with lift to chair, turn and repo. LS clear/dim with fine crackles in RLL, RAMON. BS+ active, flatus+, -BM. Kign in place, adequate UOP. Blanchable redness to coccyx, mepilex in place. Cardiac diet, tolerating well. Scheduled tylenol for chronic back pain. Tele: SR with ST depression. VQ scan of lungs completed. CXR completed. Pleural effusion still present, continue with Lasix.     1815 - Patient fell, unwitnessed. CT of head and neck done. XR of pelvis. Plan to wear [place] collar tonight.

## 2022-03-01 NOTE — PROGRESS NOTES
Care Management Discharge Note    Discharge Date: 03/03/2022       Discharge Disposition: Transitional Care- [facility]    Discharge Services: None    Discharge DME: None    Discharge Transportation: health plan transportation    Private pay costs discussed: transportation costs    PAS Confirmation Code:  [id number]  Patient/family educated on Medicare website which has current facility and service quality ratings: yes    Education Provided on the Discharge Plan:  Yes  Persons Notified of Discharge Plans: Patients spouse  Patient/Family in Agreement with the Plan: yes    Handoff Referral Completed: Yes    Additional Information:  Patient will be discharging to [facility] today at 4:00pm via [facility] due to dementia and oxygen needs. PCS Completed and faxed. Writer completed BCBS Auth. Auth # is [id number] through 3/10. Writer updated Daiana at [facility] with time and auth. PAS Completed. MD will assess and put orders in if medically ready for discharge. Patients spouse aware and in agreement.     PAS-RR    D: Per DHS regulation, SW completed and submitted PAS-RR to [place] [employer] Direct Connect via the Senior LinkAge Line.  PAS-RR confirmation # is : [id number]    I: SW spoke with spouse and they are aware a PAS-RR has been submitted.  SW reviewed with spouse that they may be contacted for a follow up appointment within 10 days of hospital discharge if their SNF stay is < 30 days.  Contact information for Senior LinkAge Line was also provided.    A: spouse verbalized understanding.    P: Further questions may be directed to [facility] LinkAge Line at #[phone], option #4 for PAS-RR staff.          DAVID Rodrigez

## 2022-03-01 NOTE — PROVIDER NOTIFICATION
Paged Marie Ureña, CNP at [phone]    [facility]! Just wondering if you are OK with patient discharging today or if you have any other recommendations? Reaching out for Dr. Garner. Thanks! Lidia CONWAY RN

## 2022-03-01 NOTE — PLAN OF CARE
Patient alert to self and time only. Up in chair with assist of 2. VSS on 3L of O2. Denies pain. Long arm due to restlessness and impulsiveness. Bed/chair alarms on. Patient is discharging to [facility] at 1600. Tele- NSR with ST depression. Sinus keyur at times. PVCs noted.

## 2022-03-01 NOTE — PLAN OF CARE
A&Ox2, disoriented to situation and time. VSS ex on 3L NC. Can be impulsive at times, requires freuqent lk-dwxesjufqeg-eag long arm. Up with 1gb and walker. King in place, intact and patent-Adequate UOP. Blanchable redness to coccyx mepilex in place.  Scheduled tylenol for back pain. Cardiac diet. Tele: SR with T-wave depression. Fall last evening CT done, Neurology signed off, no C collar needed. SW and Care management following. Will continue to monitor.

## 2022-03-01 NOTE — DISCHARGE SUMMARY
[facility]  Hospitalist Discharge Summary      Date of Admission:  2/24/2022  Date of Discharge:  3/1/2022  Discharging Provider: Wang Garner DO  Discharge Service: Hospitalist Service    Discharge Diagnoses   Acute hypoxic respiratory failure due to acute systolic CHF with pulmonary edema  Probable type II NSTEMI   Hypokalemia  Unwitnessed fall  Chronic macrocytic anemia   CKD, baseline CR 2.2. admssion 2.29.      Urinary retention  Hypertension  Chronic low back pain    Follow-ups Needed After Discharge   Follow-up Appointments     Follow Up and recommended labs and tests      Follow up with [facility] physician.  The following labs/tests are   recommended: BMP in 3-5 days.  Follow up with cardiology as planned  Follow up with PCP 1-2 weeks after TCU discharge           Unresulted Labs Ordered in the Past 30 Days of this Admission     No orders found from 1/25/2022 to 2/25/2022.        Discharge Disposition   Discharged to short-term care facility  Condition at discharge: Stable    Hospital Course   Irvin Bridges is a 89 year old male with history of dementia, CKD and aortic atherosclerosis who presents via EMS with shortness of breath.        Acute hypoxic respiratory failure due to acute systolic CHF with pulmonary edema  Probable type II NSTEMI  Patient reportedly with acute dyspnea x 1 day without chest pain, palpitations, cough, sputum.  Recently increased PTA lasix due to LE edema (followed in [facility]).  No reported hx of CAD or CHF.  EMS reports initial O2 sats 76%, placed on Bipap following arrival.    * CXR consistent with acute pulmonary edema, also noted diffuse bilateral interstitial opacities, BNP 35K.    * Serial trop mildly elevated but flat, EKG without acute ST-T elevation, flipped T laterally.  TTE showing EF 35-40%, grade 1 diastolic dysfunction, global LV hypokinesia and severe inferior wall hypokinesis (prior TTE 12/2011 with normal LV function).    * No  leukocytosis, afebrile with low procal.  Covid NEG.   * V/Q scan 2/27 with low probability of PE (obtained due to persistent hypoxia with rather minimal overload)  * repeat CXR 2/27 with persisting pulmonary edema  - Strict I/Os  - IV Lasix then transitioned to PO lasix 2/28 per Cards.  Okay with discharge on PO Lasix 40 mg   - Continue PTA carvedilol  - Initiated hydralazine and Imdur; amlodipine decreased 2/25  - Cards recs appreciated, currently planning for conservative management  - Encouraged IS and acapella, get up to chair  - Weaned to 3-4L oxygen.  Discharged with continued titration      Hypokalemia  Due to diuresis.   - Repleted as needed      Unwitnessed fall  Found on floor evening of 2/27.  CT head and CT cervical spine negative.  - Cleared by Neurosurgery and aspen collar removed 2/28     Chronic macrocytic anemia  Baseline hgb 8-9, admission 8.0.  No S/S active bleeding.  Iron studies do not suggest deficiency, folate wnl.  B12 level 2/8/22 wnl.    - Continue PTA B12  - Hgb stable about 7 g/dL; will transfuse if <7 or if Cardiology feels should keep >8 given CHF (wife consented via phone)     CKD, baseline CR 2.2. admssion 2.29.     Followed by [facility].  Bladder scan negative in ED  - Avoid nephrotoxin     Urinary retention  King placed 2/26 due to retention.   - Started flomax 2/26  - Voiding trial in 2-3 days at TCU      Hypertension  - Antihypertensives as above     Chronic low back pain  No acute change.   - PTA tylenol tid    Consultations This Hospital Stay   CARDIOLOGY IP CONSULT  PHYSICAL THERAPY ADULT IP CONSULT  OCCUPATIONAL THERAPY ADULT IP CONSULT  NEUROSURGERY IP CONSULT  CARE MANAGEMENT / SOCIAL WORK IP CONSULT  PHYSICAL THERAPY ADULT IP CONSULT  OCCUPATIONAL THERAPY ADULT IP CONSULT    Code Status   Special Code    Time Spent on this Encounter   IWang DO, personally saw the patient today and spent greater than 30 minutes discharging this patient.        Wang Garner [facility]  [address] [place] [address]  Phone: [phone]  ______________________________________________________________________    Physical Exam   Vital Signs: Temp: 97.4  F (36.3  C) Temp src: Oral BP: 99/51 Pulse: 62   Resp: 18 SpO2: 94 % O2 Device: Nasal cannula Oxygen Delivery: 3 LPM  Weight: 138 lbs 14.4 oz  General Appearance: Resting comfortably. NAD   Respiratory: Clear to auscultation.  No respiratory distress  Cardiovascular: RRR.  No obvious murmurs  GI: Soft.  Non-distended  Skin: No obvious rashes or cyanosis  Other: Alert.  Moving all extremities grossly         Primary Care Physician   Joselito CONWAY Post    Discharge Orders      Follow-Up with Cardiology KARMEN      General info for SNF    Length of Stay Estimate: Short Term Care: Estimated # of Days <30  Condition at Discharge: Stable  Level of care:skilled   Rehabilitation Potential: Fair  Admission H&P remains valid and up-to-date: Yes  Recent Chemotherapy: N/A  Use Nursing Home Standing Orders: Yes     Mantoux instructions    Give two-step Mantoux (PPD) Per Facility Policy Yes     Follow Up and recommended labs and tests    Follow up with [facility] physician.  The following labs/tests are recommended: BMP in 3-5 days.  Follow up with cardiology as planned  Follow up with PCP 1-2 weeks after TCU discharge     Reason for your hospital stay    CHF     Activity - Up ad chung     Additional Discharge Instructions    Titrate O2 as able.  Use 1-6 L via NC to maintain an SpO2 >90%     Additional Discharge Instructions    Voiding trial in 2-3 days     Physical Therapy Adult Consult    Evaluate and treat as clinically indicated.    Reason:  Deconditioning     Occupational Therapy Adult Consult    Evaluate and treat as clinically indicated.    Reason:  Cognitive impairment     Diet    Follow this diet upon discharge: Orders Placed This Encounter      Room Service      Combination Diet Low  Saturated Fat Na <2400mg Diet, No Caffeine Diet       Significant Results and Procedures   Most Recent 3 CBC's:Recent Labs   Lab Test 03/01/22  0526 02/28/22  0524 02/27/22  0523 02/26/22  1755 02/26/22  0505   WBC  --  7.6 9.5  --  8.8   HGB  --  7.1* 7.1* 7.3* 7.0*   MCV  --  105* 107*  --  109*    211 215  --  242     Most Recent 3 BMP's:Recent Labs   Lab Test 03/01/22  0525 02/28/22  1403 02/28/22  0524 02/27/22  1817 02/27/22  1608 02/27/22  0523   *  --  135  --   --  134   POTASSIUM 3.7 4.4 3.3*  --    < > 3.1*   CHLORIDE 100  --  104  --   --  104   CO2 24  --  24  --   --  23   BUN 52*  --  49*  --   --  49*   CR 2.30*  --  2.33*  --   --  2.41*   ANIONGAP 8  --  7  --   --  7   ABHI 8.7  --  8.0*  --   --  8.0*   *  --  101* 122*  --  112*    < > = values in this interval not displayed.   ,   Results for orders placed or performed during the hospital encounter of 02/24/22   XR Chest Port 1 View    Narrative    EXAM: XR CHEST PORT 1 VIEW  LOCATION: [facility] [place]  DATE/TIME: 2/24/2022 5:50 PM    INDICATION: Shortness of breath.  COMPARISON: None.      Impression    IMPRESSION: Diffuse bilateral interstitial and alveolar opacities, vascular indistinctness and at least small pleural effusions compatible with edema. Cannot exclude superimposed infectious / inflammatory process. Mildly enlarged cardiac silhouette.       US Lower Ext Venous Duplex Limited Bilat    Narrative    EXAM: US LOWER EXT VENOUS DUPLEX LIMITED BILAT  LOCATION: [facility] [place]  DATE/TIME: 2/26/2022 4:30 PM    INDICATION: Progressive shortness of breath, peripheral edema and weight gain. Assess for lower extremity DVT.  COMPARISON: None.  TECHNIQUE: Venous Duplex ultrasound of bilateral lower extremities with and without compression, augmentation and duplex. Color flow and spectral Doppler with waveform analysis performed.    FINDINGS: Exam includes the common femoral, femoral,  popliteal veins as well as segmentally visualized deep calf veins and greater saphenous vein.     RIGHT: No deep vein thrombosis. No superficial thrombophlebitis. No popliteal cyst.    LEFT: No deep vein thrombosis. No superficial thrombophlebitis. No popliteal cyst.      Impression    IMPRESSION:  1.  Negative bilateral lower extremity venous Doppler.   NM Lung Scan Perfusion Particulate    Narrative    EXAM: NM LUNG SCAN PERFUSION PARTICULATE  LOCATION: [facility]  DATE/TIME: 2/27/2022 10:15 AM    INDICATION: PE suspected, low/intermediate prob, positive D-dimer, shortness of breath  COMPARISON: Chest x-ray 02/24/2022  TECHNIQUE: 4 mCi technetium-99m MAA, IV. Standard lung perfusion imaging.    FINDINGS: Small perfusion defect right lower lung.      Impression    IMPRESSION:   1.  Low probability for pulmonary embolus.   XR Chest 2 Views    Narrative    EXAM: XR CHEST 2 VIEW  LOCATION: [facility]  DATE/TIME: 02/27/2022, 1:53 PM    INDICATION: Inability to wean oxygen, reassess infiltrate and edema.  COMPARISON: Chest x-ray 02/24/2022.      Impression    IMPRESSION: Bilateral pulmonary edema pattern appears mildly progressed. Bibasilar atelectasis or airspace disease is unchanged. Hypoinflated lungs. Stable mildly prominent cardiac silhouette.     CT Head w/o Contrast    Narrative    EXAM: CT HEAD W/O CONTRAST  LOCATION: [facility]  DATE/TIME: 2/27/2022 7:13 PM    INDICATION: Head trauma, minor (Age >= 65y)  COMPARISON: None.  TECHNIQUE: Routine CT Head without IV contrast. Multiplanar reformats. Dose reduction techniques were used.    FINDINGS:  INTRACRANIAL CONTENTS: No evidence of acute intracranial hemorrhage or mass effect. Scattered foci of decreased attenuation within the cerebral hemispheric white matter which are nonspecific, though most commonly ascribed to chronic small vessel ischemic   disease. The ventricles and sulci are  prominent consistent with mild brain parenchymal volume loss. Gray-white matter differentiation is maintained. The basilar cisterns are patent    VISUALIZED ORBITS/SINUSES/MASTOIDS: The globes are unremarkable. The partially imaged paranasal sinuses, mastoid air cells and middle ear cavities are unremarkable.     BONES/SOFT TISSUES: The visualized skull base and calvarium are unremarkable.      Impression    IMPRESSION:    1.  No evidence of acute intracranial hemorrhage or mass effect.  2.  Moderate nonspecific white matter changes.  3.  Moderate brain parenchymal volume loss.   CT Cervical Spine w/o Contrast    Narrative    EXAM: CT CERVICAL SPINE W/O CONTRAST  LOCATION: [facility]  DATE/TIME: 2/27/2022 7:13 PM    INDICATION: Neck trauma (Age >= 65y)  COMPARISON: None.  TECHNIQUE: Routine CT Cervical Spine without IV contrast. Multiplanar reformats. Dose reduction techniques were used.    FINDINGS:  No evidence of acute fracture or subluxation of the cervical spine by CT imaging. Anterolisthesis of C3 on C4 measuring 1 mm. Retrolisthesis of C5 on C6 measuring 2 mm. Anterior marginal osteophytes at C4/C5-C6/C7. The vertebral bodies of the cervical   spine otherwise have normal stature and alignment. Multilevel degenerative disc disease of the cervical spine with disc height loss, most pronounced at C4/C5 and C5/C6. The partially imaged intracranial contents are unremarkable. No prevertebral soft   tissue swelling. Small bilateral pleural effusions.     Craniovertebral junction and C1-C2: The odontoid process is well approximated with the anterior body of C1 and well aligned between the lateral masses of C1.    C2-C3: No posterior disc bulge or spinal canal narrowing. Uncovertebral joint disease and facet arthropathy with severe right neural foraminal narrowing. No left neural foraminal narrowing.     C3-C4: No posterior disc bulge or spinal canal narrowing. Uncovertebral joint disease and  facet arthropathy with severe bilateral neural foraminal narrowing.     C4-C5: Broad bar disc osteophyte complex with mild spinal canal narrowing. Vertebral joint disease and facet arthropathy with severe bilateral neural foraminal narrowing.     C5-C6: No posterior disc bulge or spinal canal narrowing. Uncovertebral joint disease and facet arthropathy with severe bilateral neural foraminal narrowing.     C6-C7: No posterior disc bulge or spinal canal narrowing. Uncovertebral joint disease and facet arthropathy with severe bilateral neural foraminal narrowing.     C7-T1: No posterior disc bulge or spinal canal narrowing. Uncovertebral joint disease and facet arthropathy with moderate bilateral neural from narrowing.       Impression    IMPRESSION:  1.  No evidence of acute fracture or subluxation of the cervical spine by CT imaging.  2.  Degenerative cervical spondylosis with level by level analysis as described above.  3.  Partially imaged bilateral pleural effusions.   XR Pelvis 1/2 Views    Narrative    EXAM: XR PELVIS 1/2 VW  LOCATION: [facility]  DATE/TIME: 2022 9:15 PM    INDICATION: unwintessed fall, c o L hip pain, pt has dementia  COMPARISON: None.      Impression    IMPRESSION: Normal joint spaces and alignment. No fracture.    Degenerative change in the lumbar spine.   Echocardiogram Complete     Value    LVEF  35-40%    Narrative    [id number]  [id number]  [id number]  [id number]^ASHLEY^WILBERT^HAIR     [facility]  Echocardiography Laboratory  [address] [place] [address]     Name: NIGEL COTTRELL  MRN: [id number]  : 1932  Study Date: 2022 10:35 AM  Age: 89 yrs  Gender: Male  Patient Location: [facility]  Reason For Study: CHF  Ordering Physician: WILBERT RAMIREZ  Performed By: Nelia Chavez     BSA: 1.8 m2  Height: 65 in  Weight: 152 lb  HR: 69  BP: 163/93  mmHg  ______________________________________________________________________________  Procedure  Complete Portable Echo Adult.  ______________________________________________________________________________  Interpretation Summary     There is moderate global hypokinesia of the left ventricle.  There is severe inferior wall hypokinesis.  The visual ejection fraction is 35-40%.  Grade I or early diastolic dysfunction.  Mild to moderate valvular aortic stenosis.  IVC diameter <2.1 cm collapsing >50% with sniff suggests a normal RA pressure  of 3 mmHg.  There is no comparison study available.  ______________________________________________________________________________  Left Ventricle  The left ventricle is normal in size. There is normal left ventricular wall  thickness. The visual ejection fraction is 35-40%. Grade I or early diastolic  dysfunction. There is moderate global hypokinesia of the left ventricle. There  is severe inferior wall hypokinesis.     Right Ventricle  The right ventricle is normal in structure, function and size.     Atria  The left atrium is mildly dilated. Right atrial size is normal. Intact atrial  septum.     Mitral Valve  The mitral valve is normal in structure and function. There is trace mitral  regurgitation.     Tricuspid Valve  The tricuspid valve is normal in structure and function. There is trace  tricuspid regurgitation. Right ventricular systolic pressure could not be  approximated due to inadequate tricuspid regurgitation.     Aortic Valve  The aortic valve is normal in structure and function. The peak AoV pressure  gradient is 19.0 mmHg. The mean AoV pressure gradient is 16.3 mmHg. The  calculated aortic valve are is 1.5 cm^2. Mild to moderate valvular aortic  stenosis.     Pulmonic Valve  The pulmonic valve is not well visualized.     Vessels  Normal size aorta. Normal size ascending aorta. IVC diameter <2.1 cm  collapsing >50% with sniff suggests a normal RA pressure of 3  mmHg.     Pericardium  The pericardium appears normal. Small left pleural effusion.     Rhythm  Sinus rhythm was noted.  ______________________________________________________________________________  MMode/2D Measurements & Calculations     IVSd: 0.90 cm  LVIDd: 5.3 cm  LVIDs: 4.6 cm  LVPWd: 0.92 cm  FS: 13.1 %  LV mass(C)d: 175.0 grams  LV mass(C)dI: 99.4 grams/m2  Ao root diam: 3.6 cm  LA dimension: 3.3 cm  asc Aorta Diam: 3.5 cm  LA/Ao: 0.92  LVOT diam: 2.0 cm  LVOT area: 3.1 cm2  LA Volume (BP): 58.5 ml  LA Volume Index (BP): 33.2 ml/m2  RWT: 0.35     Doppler Measurements & Calculations  MV E max bryan: 95.1 cm/sec  MV A max bryan: 116.9 cm/sec  MV E/A: 0.81  MV dec slope: 581.0 cm/sec2  Ao V2 max: 222.8 cm/sec  Ao max P.0 mmHg  Ao V2 mean: 166.4 cm/sec  Ao mean P.3 mmHg  Ao V2 VTI: 52.2 cm  ABIEL(I,D): 1.5 cm2  ABIEL(V,D): 1.4 cm2  LV V1 max P.1 mmHg  LV V1 max: 100.9 cm/sec  LV V1 VTI: 24.4 cm  SV(LVOT): 76.5 ml  SI(LVOT): 43.4 ml/m2  AV Bryan Ratio (DI): 0.45  ABIEL Index (cm2/m2): 0.83  E/E' avg: 15.3  Lateral E/e': 13.9  Medial E/e': 16.7     ______________________________________________________________________________  Report approved by: Dave Landeros 2022 01:33 PM               Discharge Medications   Current Discharge Medication List      START taking these medications    Details   hydrALAZINE (APRESOLINE) 25 MG tablet Take 1 tablet (25 mg) by mouth every 8 hours    Associated Diagnoses: Acute systolic congestive heart failure (H)      isosorbide mononitrate (IMDUR) 30 MG 24 hr tablet Take 1 tablet (30 mg) by mouth daily    Associated Diagnoses: Acute systolic congestive heart failure (H)      nitroGLYcerin (NITROSTAT) 0.4 MG sublingual tablet For chest pain place 1 tablet under the tongue every 5 minutes for 3 doses. If symptoms persist 5 minutes after 1st dose call 911.    Associated Diagnoses: Acute systolic congestive heart failure (H)      sennosides (SENOKOT) 8.6 MG tablet Take  1-2 tablets by mouth 2 times daily as needed for constipation    Associated Diagnoses: Acute systolic congestive heart failure (H)      simethicone (MYLICON) 80 MG chewable tablet Take 1 tablet (80 mg) by mouth every 6 hours as needed for cramping    Associated Diagnoses: Acute systolic congestive heart failure (H)      tamsulosin (FLOMAX) 0.4 MG capsule Take 1 capsule (0.4 mg) by mouth daily    Associated Diagnoses: Acute systolic congestive heart failure (H)         CONTINUE these medications which have CHANGED    Details   amLODIPine (NORVASC) 5 MG tablet Take 1 tablet (5 mg) by mouth daily    Associated Diagnoses: Acute systolic congestive heart failure (H)      furosemide (LASIX) 40 MG tablet Take 1 tablet (40 mg) by mouth daily    Associated Diagnoses: Acute systolic congestive heart failure (H)         CONTINUE these medications which have NOT CHANGED    Details   acetaminophen (TYLENOL) 500 MG tablet Take 1,000 mg by mouth 3 times daily       calcium carbonate (TUMS) 500 MG chewable tablet Take 1 chew tab by mouth 2 times daily as needed for heartburn      carvedilol (COREG) 12.5 MG tablet Take 12.5 mg by mouth 2 times daily      escitalopram (LEXAPRO) 5 MG tablet Take 5 mg by mouth daily      polyethylene glycol-propylene glycol (SYSTANE ULTRA) 0.4-0.3 % SOLN ophthalmic solution Place 1 drop into both eyes every hour as needed for dry eyes      potassium chloride ER (KLOR-CON M) 20 MEQ CR tablet Take 20 mEq by mouth 2 times daily      vitamin B-12 (CYANOCOBALAMIN) 1000 MCG tablet Take 1 tablet by mouth daily           Allergies   Allergies   Allergen Reactions     Lactose Unknown     Memantine Other (See Comments)     Ampicillin Other (See Comments) and Unknown     Unknown, states he hasn't had it in 40 yrs         Rosuvastatin Other (See Comments) and Unknown     myositis

## 2022-03-01 NOTE — PROGRESS NOTES
Hospitalist service cross-cover note:     Paged regarding abdominal pain. Evaluated patient at bedside. Patient is poor historian due to dementia. Initially screaming out for help due to pain. During questioning appeared to be more comfortable. Able to state he is in the hospital, though not reason why, not able to state date. Abdominal exam benign, no tenderness to palpation in area of reported pain, no rebound tenderness or guarding, no obvious hernia. Given very benign exam do not feel CT warranted. Ordered simethicone PRN. Low dose hydromorphone ordered PRN, though discussed with RN to use cautiously given advanced age and dementia.     Ander King MD   Hospitalist  [phone]

## 2022-03-01 NOTE — PROVIDER NOTIFICATION
Text paged hospitalist re: pt's sudden complaints of left inguinal pain, appears spasmodic in nature, rates at 9-10. Was given scheduled tylenol at 2200 with no complaints at that time.

## 2022-03-01 NOTE — PLAN OF CARE
"Goal Outcome Evaluation: Declining  Pt complained of sudden sharp pain at 2300 in left inguinal area, sharp spasm-like, had scheduled tylenol at 2200. Attempted heat to area and up to chair, MD up to evaluate. Pt settled after about two hours, then began moaning and writhing in pain again around 0430, this time back pain. Rating pain \"10\" Medicated with Dilaudid 0.2 mg and warm pack to lower back with little effect. He is oriented to self and place, attempted to get OOB/chair, long arm sit. 02 at 3 L/NC most of noc, desats at times to mid 80's and Oxymask placed for short periods. Tele= NSR                      "

## 2022-03-01 NOTE — PROGRESS NOTES
Wife states patient is missing slack, zip up sweater, and slippers. Not in room. Called security x2 and well as ED to try to locate items.     Update- ED stated anything would be sent to security. Security stated they did not have any belongings.    No documented personal belongings at admission.

## 2022-03-01 NOTE — PROGRESS NOTES
[facility]  Cardiology Progress Note    Date of Service (when I saw the patient): 03/01/2022      Assessment & Plan   Irvin Bridges is a 89 year old male who was admitted on 2/24/2022. He carries a PMH significant for CKD, chronic anemia, hypertension, cognitive impairment, chronic peripheral edema, and osteoarthritis/back pain.     1.  : Acute systolic HFrEF / Mild to mod Aortic Stenosis   - NT pro BNP > 35,000 upon admission   - Echocardiogram showed moderate global hypokinesia of the LV, EF 35-40%, severe inferior wall hypokinesis, grade 1 diastolic dysfunction and mild to moderate aortic stenosis.   - Diuresed well on IV lasix, Transitioned to PO lasix 40 mg daily 2/28/2022   - Up 4 lbs overnight, Trace ankle edema  - net neg 40 ml  - Creatinine 2.3, Bumped to 52   - Denies shortness of breath or orthopnea   - PTA carvedilol  - Hydralazine/ Imdur  - Strict I/O's  - daily weight  - Anticipate discharge to TCU today.   - Follow up with cardiology in 1 week     2.  : Troponin elevation   - HS troponin 396  - Admission EKG shows new ST depression laterally   - Tele confirms NSR w/ ST depression   - Chest pain free   - Medical management for now due to anemia and kidney insufficiency.  - May consider coronary angiogram in the future if patient/family agreeable.     3.  : Hypertension   - Borderline soft    4. CKD   - creatinine down trending 2.30 ( baseline 2.0)  - No ACE/ARB    5. Anemia   - 7.1 ( Baseline 8-9) .  - Management per medicine team.   6. Dementia       I spent > 15 minutes face-to-face and/or coordinating care. Over 50% of our time on the unit was spent counseling the patient and/or coordinating care.    Marie Ureña, APRN CNP  Text Page  (M-F, 7:30 am - 4:00 pm)    Interval History   Up in chair, confused. Denies chest pain, shortness of breath,  palpitations, PND, orthopnea, or presyncope. Trace ankle edema, up 4 lbs overnight. Blood pressures mildly soft. Creatinine  stable at 2.3, BUN bumped to 52. Tolerating PO diuretics. Plan to transfer to TCU today.     Physical Exam   Temp: 97.4  F (36.3  C) Temp src: Oral BP: 106/61 Pulse: 54   Resp: 18 SpO2: 94 % O2 Device: Nasal cannula Oxygen Delivery: 3 LPM  Vitals:    02/27/22 0605 02/28/22 0533 03/01/22 0641   Weight: 63 kg (139 lb) 60.8 kg (134 lb) 63 kg (138 lb 14.4 oz)     Vital Signs with Ranges  Temp:  [97.2  F (36.2  C)-97.8  F (36.6  C)] 97.4  F (36.3  C)  Pulse:  [54-65] 54  Resp:  [16-20] 18  BP: ()/(61-89) 106/61  SpO2:  [94 %-98 %] 94 %  I/O last 3 completed shifts:  In: 960 [P.O.:960]  Out: 1000 [Urine:1000]    GEN:  In general, this is a thin elderly male  in no acute distress. HEENT:  Pupils equal, round. Sclerae nonicteric. Clear oropharynx. Mucous membranes moist.  NECK: Supple, no masses appreciated. Trachea midline. Difficult to assess JVD   C/V:  Regular rate, tachycardic, no murmur, rub or gallop. No S3 or RV heave.   RESP: Respirations are unlabored. No use of accessory muscles. Decreased breath sounds to bilateral bases, fine crackles   GI: Abdomen soft, nontender, nondistended. No HSM appreciated.   EXTREM: No  LE edema. No cyanosis or clubbing.  NEURO: Alert and oriented, cooperative. Episodes of confusion   PSYCH: Normal affect.  SKIN: Warm and dry. No rashes or petechiae appreciated.       Medications       acetaminophen  1,000 mg Oral TID     amLODIPine  5 mg Oral Daily     carvedilol  12.5 mg Oral BID     cyanocobalamin  1,000 mcg Oral Daily     escitalopram  5 mg Oral Daily     furosemide  40 mg Oral Daily     heparin ANTICOAGULANT  5,000 Units Subcutaneous Q12H     hydrALAZINE  25 mg Oral Q8H LACY     isosorbide mononitrate  30 mg Oral Daily     polyethylene glycol  17 g Oral Daily     sodium chloride (PF)  3 mL Intracatheter Q8H     tamsulosin  0.4 mg Oral Daily       Data   Reviewed       Marie Niranjan, APRN CNP  3/1/2022

## 2022-03-02 NOTE — PLAN OF CARE
Physical Therapy Discharge Summary    Reason for therapy discharge:    Discharged to transitional care facility.    Progress towards therapy goal(s). See goals on Care Plan in [facility] electronic health record for goal details.  Goals not met.  Barriers to achieving goals:   discharge from facility.    Therapy recommendation(s):    Continued therapy is recommended.  Rationale/Recommendations:  Pt has deficits with mobility that will continue to be addressed at TCU.

## 2022-03-02 NOTE — PROGRESS NOTES
Clinic Care Coordination Contact    Background: Care Coordination referral placed from [facility] discharge report for reason of patient meeting criteria for a TCM outreach call by [facility] team.    Assessment: Upon chart review, CCR[facility] Team member will cancel/close the referral for TCM outreach due to reason below:    Patient is not established within [facility]. Upon chart review, CCRC Team member noted patient discharged to TCU    Plan: Care Coordination referral for TCM outreach canceled.    Darling Moura MA  [facility], [facility]

## 2022-03-02 NOTE — PLAN OF CARE
Occupational Therapy Discharge Summary    Reason for therapy discharge:    Discharged to transitional care facility.    Progress towards therapy goal(s). See goals on Care Plan in [facility] electronic health record for goal details.  Goals partially met.  Barriers to achieving goals:   discharge from facility.    Therapy recommendation(s):    Continued therapy is recommended.  Rationale/Recommendations:  Pt functioning below baseline and will benefit from continued skilled OT to maximize safety and independence.

## 2022-03-03 PROBLEM — R41.0 DELIRIUM: Status: ACTIVE | Noted: 2022-01-01

## 2022-03-03 PROBLEM — D64.9 ANEMIA, UNSPECIFIED TYPE: Status: ACTIVE | Noted: 2022-01-01

## 2022-03-03 NOTE — ED PROVIDER NOTES
History   Chief Complaint:  Abnormal Labs       The history is provided by the patient and medical records.      Irvin Bridges is a 89 year old male with history of hyperlipidemia, hypertension, acute CHF, and skin cancer who presents with abnormal labs. Per triage note, the patient presents from his nursing home due to concern for low hemoglobin levels. At this time, he has no complaints. He denies lightheadedness or any bleeding problems. Per chart review, the patient has a history of chronic macrocytic anemia. Of note, he was recently discharged from [facility] 2 days ago, after a 5 day stay for acute CHF and respiratory failure.    Component      Latest Ref Rng & Units 2/24/2022 2/25/2022 2/26/2022 2/26/2022             5:05 AM  5:55 PM   Hemoglobin      13.3 - 17.7 g/dL 8.0 (L) 7.8 (L) 7.0 (L) 7.3 (L)     Component      Latest Ref Rng & Units 2/27/2022 2/28/2022 3/3/2022 3/3/2022            10:13 AM  5:53 PM   Hemoglobin      13.3 - 17.7 g/dL 7.1 (L) 7.1 (L) 6.5 (LL) 6.8 (LL)       [facility]  Hospitalist Discharge Summary    Date of Admission:  2/24/2022  Date of Discharge:  3/1/2022  Discharging Provider: Wang Garner DO    Discharge Diagnoses  Acute hypoxic respiratory failure due to acute systolic CHF with pulmonary edema  Probable type II NSTEMI   Hypokalemia  Unwitnessed fall  Chronic macrocytic anemia   CKD, baseline CR 2.2. admssion 2.29.      Urinary retention  Hypertension  Chronic low back pain     Follow-ups Needed After Discharge  Follow-up Appointments     Follow Up and recommended labs and tests      Follow up with senior care physician.  The following labs/tests are   recommended: BMP in 3-5 days.  Follow up with cardiology as planned  Follow up with PCP 1-2 weeks after TCU discharge      Discharge Disposition   Discharged to short-term care facility  Condition at discharge: Stable     Hospital Course  Irvin Bridges is a 89 year old male with  history of dementia, CKD and aortic atherosclerosis who presents via EMS with shortness of breath.     Acute hypoxic respiratory failure due to acute systolic CHF with pulmonary edema, Probable type II NSTEMI  Patient reportedly with acute dyspnea x 1 day without chest pain, palpitations, cough, sputum.  Recently increased PTA lasix due to LE edema (followed in [facility]).  No reported hx of CAD or CHF.  EMS reports initial O2 sats 76%, placed on Bipap following arrival.    * CXR consistent with acute pulmonary edema, also noted diffuse bilateral interstitial opacities, BNP 35K.    * Serial trop mildly elevated but flat, EKG without acute ST-T elevation, flipped T laterally.  TTE showing EF 35-40%, grade 1 diastolic dysfunction, global LV hypokinesia and severe inferior wall hypokinesis (prior TTE 12/2011 with normal LV function).    * No leukocytosis, afebrile with low procal.  Covid NEG.   * V/Q scan 2/27 with low probability of PE (obtained due to persistent hypoxia with rather minimal overload)  * repeat CXR 2/27 with persisting pulmonary edema  - Strict I/Os  - IV Lasix then transitioned to PO lasix 2/28 per Cards.  Okay with discharge on PO Lasix 40 mg   - Continue PTA carvedilol  - Initiated hydralazine and Imdur; amlodipine decreased 2/25  - Cards recs appreciated, currently planning for conservative management  - Encouraged IS and acapella, get up to chair  - Weaned to 3-4L oxygen.  Discharged with continued titration      Hypokalemia  Due to diuresis.   - Repleted as needed      Unwitnessed fall  Found on floor evening of 2/27.  CT head and CT cervical spine negative.  - Cleared by Neurosurgery and aspen collar removed 2/28     Chronic macrocytic anemia  Baseline hgb 8-9, admission 8.0.  No S/S active bleeding.  Iron studies do not suggest deficiency, folate wnl.  B12 level 2/8/22 wnl.    - Continue PTA B12  - Hgb stable about 7 g/dL; will transfuse if <7 or if Cardiology feels should keep >8 given CHF (wife  consented via phone)     CKD, baseline CR 2.2. admssion 2.29.     Followed by [facility].  Bladder scan negative in ED  - Avoid nephrotoxin     Urinary retention  King placed 2/26 due to retention.   - Started flomax 2/26  - Voiding trial in 2-3 days at TCU      Hypertension  - Antihypertensives as above     Chronic low back pain  No acute change.   - PTA tylenol tid      Review of Systems   Reason unable to perform ROS: Poor historian.       Allergies:  Lactose  Memantine  Ampicillin  Rosuvastatin    Medications:  Amlodipine   Carvedilol   Lexapro  Lasix  Hydralazine  Imdur  Nitroglycerine  Klor-Con  Sennosides  Mylicon  Flomax  Cyanocobalamin     Past Medical History:     Acute systolic CHF  Acute respiratory failure with hypoxia  Atherosclerosis of aorta  Benign neoplasm of adrenal gland  Cholecystitis with cholelithiasis  Chronic kidney disease   Degenerative arthritis  Hyperlipidemia  Hypertension  Mild cognitive impairment  Mild renal insufficiency    Osteoarthritis   Skin cancer    Past Surgical History:    Cholecystectomy  Left knee surgery  Right knee surgery  Appendectomy     Family History:    Father: Heart disease, MI, hypertension   Mother: Alzheimer's disease  Sister: Sleep apnea, Meniere's    Social History:  Presents to the emergency department alone   Arrives via ambulance     Physical Exam     Patient Vitals for the past 24 hrs:   BP Temp Temp src Pulse Resp SpO2   03/03/22 2200 (!) 138/107 97.9  F (36.6  C) -- 72 16 --   03/03/22 2130 (!) 138/27 -- -- -- -- --   03/03/22 2115 -- -- -- -- -- 90 %   03/03/22 2108 (!) 137/106 97.3  F (36.3  C) -- 67 -- --   03/03/22 2030 (!) 141/71 -- -- 73 -- --   03/03/22 2015 122/51 97.2  F (36.2  C) -- 84 -- 91 %   03/03/22 2009 120/69 97.3  F (36.3  C) -- 62 16 --   03/03/22 1752 -- -- -- -- -- 97 %   03/03/22 1750 -- -- -- -- 18 --   03/03/22 1730 114/60 -- -- 62 -- 95 %   03/03/22 1727 123/66 -- -- 64 -- 93 %   03/03/22 6386 -- 97.1  F (36.2  C)  Temporal -- -- --       Physical Exam  General: Alert and Sleepy.   Head: No signs of trauma.   Mouth/Throat: Oropharynx is clear and moist.   Eyes: Conjunctivae are pale. Pupils are equal, round, and reactive to light.   Neck: Normal range of motion. No nuchal rigidity.   CV: Normal rate and regular rhythm. Systolic murmur  Resp: Effort normal and breath sounds normal. No respiratory distress.   GI: Soft. There is no tenderness or guarding.   MSK: Normal range of motion. no edema.   Neuro: The patient is alert and oriented to person, place, and time.  PERRLA, EOMI, strength in upper/lower extremities normal and symmetrical.   Sensation normal. Speech normal.  GCS eye subscore is 4. GCS verbal subscore is 5. GCS motor subscore is 6.   Skin: Skin is warm and dry. No rash noted.   Psych: normal mood and affect. behavior is normal.       Emergency Department Course     Laboratory:  Labs Ordered and Resulted from Time of ED Arrival to Time of ED Departure   COMPREHENSIVE METABOLIC PANEL - Abnormal       Result Value    Sodium 135      Potassium 4.1      Chloride 103      Carbon Dioxide (CO2) 25      Anion Gap 7      Urea Nitrogen 47 (*)     Creatinine 2.40 (*)     Calcium 8.7      Glucose 146 (*)     Alkaline Phosphatase 74      AST 18      ALT 17      Protein Total 6.4 (*)     Albumin 2.8 (*)     Bilirubin Total 0.2      GFR Estimate 25 (*)    CBC WITH PLATELETS AND DIFFERENTIAL - Abnormal    WBC Count 9.0      RBC Count 2.03 (*)     Hemoglobin 6.8 (*)     Hematocrit 22.0 (*)      (*)     MCH 33.5 (*)     MCHC 30.9 (*)     RDW 15.6 (*)     Platelet Count 205      % Neutrophils 75      % Lymphocytes 11      % Monocytes 12      % Eosinophils 1      % Basophils 0      % Immature Granulocytes 1      NRBCs per 100 WBC 0      Absolute Neutrophils 6.8      Absolute Lymphocytes 1.0      Absolute Monocytes 1.1      Absolute Eosinophils 0.1      Absolute Basophils 0.0      Absolute Immature Granulocytes 0.1      Absolute  NRBCs 0.0     COVID-19 VIRUS (CORONAVIRUS) BY PCR   COVID-19 VIRUS (CORONAVIRUS) BY PCR   TYPE AND SCREEN, ADULT    ABO/RH(D) O POS      Antibody Screen Negative      SPECIMEN EXPIRATION DATE [id number]     PREPARE RED BLOOD CELLS (UNIT)    CROSSMATCH Compatible      UNIT ABO/RH O Pos      Unit Number [id number]      Unit Status Issued      Blood Component Type Red Blood Cells      Product Code [id number]      CODING SYSTEM [id number]      UNIT TYPE ISBT 5100      ISSUE DATE AND TIME [id number]     PREPARE RED BLOOD CELLS (UNIT)   TRANSFUSE RED BLOOD CELLS (UNIT)   ABO/RH TYPE AND SCREEN        Emergency Department Course:     Reviewed:  I reviewed nursing notes, vitals, past medical history and Care Everywhere    Assessments:  1814 I obtained history and examined the patient as noted above.   2004 I spoke with the patient's wife regarding the patient via phone.  2252 I spoke with [facility], where the patient is from via phone. They are hesitant to have him come back due to his agitation.  2256 I rechecked the patient and explained findings.     Consults:  2241 I spoke with Dr. Ander King of the hospitalist service, who agreed to admit the patient.    Interventions:  2123 Lidocaine 6 mL Urethral  2123 Ativan 0.5 mg IV  2237 Haldol 2 mg IV    Disposition:  The patient was admitted to the hospital under the care of Dr. King.     Impression & Plan     Medical Decision Making:  This patient is presenting to the ER with lab abnormalities consistent with a low hemoglobin.  The patient has been drifting lower throughout his hospital stay.  He was diagnosed with chronic anemia.  Given his previous plan during his inpatient stay.  The patient was given 1 unit packed red blood cells because his hemoglobin is below 7.    This is complicated by the patient's congestive heart failure.  The blood was transfused to slow as possible.  This caused the patient to be in the ED for an extended period of time.  He  started to become somewhat delirious and pulled out his King catheter as well as his IV.  This indwelling King catheter was replaced as was his IV.  The patient was given IV Ativan, Haldol and ODT Zyprexa.  He was placed in soft restraints for short period of time for his safety.  I admitted attempts to gauge whether or not the patient would be acceptable for transport back to the care center.  The staff does not feel like he would be safe there tonight given his current mental status.  Spoke with the admitting hospitalist, who was willing to admit the patient    Diagnosis:    ICD-10-CM    1. Anemia, unspecified type  D64.9    2. Delirium  R41.0        Scribe Disclosure:  I, Lincoln Lyle, am serving as a scribe at 5:55 PM on 3/3/2022 to document services personally performed by Geraldo Lucas MD based on my observations and the provider's statements to me.            Geraldo Lucas MD  03/04/22 0019

## 2022-03-03 NOTE — ED NOTES
Bed: ED25  Expected date:   Expected time:   Means of arrival:   Comments:  HEMS 436 86M low hemoglobin ETA 5:15

## 2022-03-04 PROBLEM — I50.9 CONGESTIVE HEART FAILURE (CHF) (H): Status: ACTIVE | Noted: 2022-01-01

## 2022-03-04 NOTE — PROGRESS NOTES
RECEIVING UNIT ED HANDOFF REVIEW    ED Nurse Handoff Report was reviewed by: Kinjal Cook RN on March 4, 2022 at 12:39 AM

## 2022-03-04 NOTE — ED NOTES
Pt pulled out IV and pulled toussaint part way out.  Paused the blood transfusion and placed a new IV.  VSS.  Blood around urethra and in the toussaint.  Attempted to reposition toussaint and attempted to irrigate to ensure placement and flow, unable to irrigate.  Removed toussaint and plan to replace.

## 2022-03-04 NOTE — PROVIDER NOTIFICATION
Pt c/o pain; moaning. Unable to give PO tylenol as pt is sleepy. Web-paged Dr. Garibay requesting for tylenol suppository. Order received.

## 2022-03-04 NOTE — PROGRESS NOTES
RECEIVING UNIT ED HANDOFF REVIEW    ED Nurse Handoff Report was reviewed by: Taina Mendoza RN on March 4, 2022 at 2:36 PM

## 2022-03-04 NOTE — PROGRESS NOTES
[facility]    Medicine Progress Note - Hospitalist Service    Date of Admission:  3/3/2022    Assessment & Plan        Irvin Bridges is an 89 year-old male with history of dementia, chronic kidney disease stage 4 and aortic atherosclerosis who presents via EMS with shortness of breath. Admitted 3/3/2022.     Acute on chronic systolic congestive heart failure (HFrEF)  Acute hypoxic respiratory failure secondary to flash pulmonary edema   Hypertension   *Brought in to ED on 2L nasal cannula by EMS, though per notes not clear he was ever hypoxic prior to this  *Following episode of agitation and administration of sedatives, was placed on non-rebreather for reported oxygen saturations of 80%.   *CXR with worsening in engorged indistinct central pulmonary vasculature with increased interstitial markings consistent with worsening interstitial pulmonary edema  *Acute hypoxia likely multifactorial from sedation and flash pulmonary edema potentially caused by severe agitation versus blood transfusion  *Recently admitted 2/24-3/1/22 for acute hypoxic respiratory failure due to acute systolic CHF with pulmonary edema, probable type II NSTEMI for above. Medications adjusted by cardiology including initiating hydralazine and Imdur, discharged on PO furosemide  *Echocardiogram 2/25 with EF 35-50%, early diastolic dysfunction   - Continue furosemide IV at 40 mg BID per cardiology 3/4  - Cardiology consulted given re-admission  - Daily weights, strict I/O  - Continue prior to admission amlodipine, carvedilol, furosemide, hydralazine, Imdur (when able to take PO)  - Daily BMP while diuresing     Acute on chronic macrocytic anemia  *CBC drawn at TCU initially at 6.5 g/dl, recheck here 6.8 g/dl. Reportedly had some hematuria at TCU with clot noted at King tip, that resolved with removal.   *Hemoglobin in low 7 g/dls during most recent hospitalization. Iron studies not suggestive of deficiency, folate and  B12 within normal limits.    - Transfused 1 unit in ED and 3/4 Hgb 8.4  - follow CBC     Dementia with behavioral disturbance  While receiving cares in the ED, patient pulled out his catheter and IV and required IV Haldol and IV lorazepam. Likely due to sundowning, otherwise was not complaining of symptoms prior to this and was reportedly at his baseline.   - Olanzapine ODT PRN. Attempt to avoid IV medications as these may delay discharge back to facility   - Sitter, restraints as needed   - SW consulted for discharge back to facility   - Re-orient as needed  - Maintain normal day/night, sleep wake cycles  - Minimize sedating/altering medications as able  - Treat separate conditions as detailed above/below   - continued agitation in ED so far     Urinary retention  Meatus bleeding secondary to trauma   King placed 2/26 due to retention, discharged with King with plan for TOV at TCU.  - Pulled King in TCU with some bleeding from balloon trauma   - King replaced in ED and bleeding resolved  - Continue tamsulosin     Chronic kidney disease, stage 4  Baseline creatinine 2.3-2.4 with GFR in 20s. Creatinine 2.4 on admission.   - Currently around baseline  - Avoid nephrotoxins     Depression  - Continue prior to admission escitalopram     Chronic low back pain  No acute change.   - Continue scheduled acetaminophen          Diet: 2 Gram Sodium Diet Other - please comment    DVT Prophylaxis: Pneumatic Compression Devices  King Catheter: PRESENT, indication:    Central Lines: None  Cardiac Monitoring: None  Code Status:   Unable to discuss with patient. Ordered DNI status as previously documented.       Disposition Plan   Expected Discharge: 2 days possibly       The patient's care was discussed with the Bedside Nurse and Patient.    Ryan Garibay MD  Hospitalist Service  [facility]  Securely message with the Vocera Web Console (learn more here)  Text page via [employer] Paging/Directory          Clinically Significant Risk Factors Present on Admission                     ______________________________________________________________________    Interval History   Care assumed for today.  Patient seen and examined this afternoon in the ER.  Continues to be agitated in general but is currently more calm.  Has required mitts/restraints at times.  Currently refusing oral meds.  Continuing IV diuresis.    Data reviewed today: I reviewed all medications, new labs and imaging results over the last 24 hours. I personally reviewed no images or EKG's today.    Physical Exam   Vital Signs: Temp: 98.1  F (36.7  C) Temp src: Temporal BP: 117/74 Pulse: 68   Resp: 13 SpO2: 98 % O2 Device: Nasal cannula Oxygen Delivery: 5 LPM  Weight: 0 lbs 0 oz    Gen: NAD, elderly, confused  HEENT: Normocephalic, EOMI, MMM  Resp: no focal crackles,  no wheezes, no increased work of resp  CV: S1S2 heard, reg rhythm, reg rate  Abdo: soft, nontender, nondistended, bowel sounds present  Ext: calves nontender, well perfused  Neuro: AAO, baseline dementia noted , CN grossly intact, no facial asymmetry      Data   Recent Labs   Lab 03/04/22  0643 03/03/22  1753 03/03/22  1013 03/01/22  0526 03/01/22  0525   WBC 10.5 9.0 10.5  --   --    HGB 8.4* 6.8* 6.5*  --   --     108* 106*  --   --     205 190   < >  --     135  --   --  132*   POTASSIUM 3.6 4.1  --   --  3.7   CHLORIDE 104 103  --   --  100   CO2 28 25  --   --  24   BUN 42* 47*  --   --  52*   CR 2.15* 2.40*  --   --  2.30*   ANIONGAP 5 7  --   --  8   ABHI 8.6 8.7  --   --  8.7   * 146*  --   --  106*   ALBUMIN  --  2.8*  --   --   --    PROTTOTAL  --  6.4*  --   --   --    BILITOTAL  --  0.2  --   --   --    ALKPHOS  --  74  --   --   --    ALT  --  17  --   --   --    AST  --  18  --   --   --     < > = values in this interval not displayed.     Recent Results (from the past 24 hour(s))   XR Chest Port 1 View    Narrative    EXAM: XR CHEST PORT 1  VIEW  LOCATION: [facility]  DATE/TIME: 3/4/2022 1:15 AM    INDICATION: Hypoxia.  CHF  COMPARISON: 02/27/2022      Impression    IMPRESSION: Worsening in engorged indistinct central pulmonary vasculature with increased interstitial markings consistent with worsening interstitial pulmonary edema. Small bilateral pleural effusions. No pneumothorax. Stable borderline enlarged cardiac   silhouette.

## 2022-03-04 NOTE — ED NOTES
Patient currently has 1:1 sitter.  Restless.  Not aggressive.  Confused continuous fidgety, restless feet, legs.    Patient repositioned a couple of times.  Now has pillow in between legs & under knees.  Oxymask 12 Lt.

## 2022-03-04 NOTE — ED NOTES
Patient doing better while sitting pretty much all the way up.  Less fidgety.  Still trying to grab at catheter.  When he is told not to pull there as he has a catheter & it will hurt; moves hand away right away.

## 2022-03-04 NOTE — CONSULTS
[facility]    Cardiology Consult Note     Assessment & Plan   Irvin Bridges is a 89 year old male who was admitted on 3/3/2022.     1. Chronic systolic heart failure, ischemic with inferior wall motion abnormality EF 35-40%.  a. Decreased furosemide to 40 mg IV twice daily   b. Recommend transitioning to oral furosemide when tolerated   c. His renal function is tenuous and his estimated outpatient dose of furosemide is 40 mg daily.  d.  Agree with continuation of medical therapy for chronic systolic heart failure with improving volume overload.  Noted that his carvedilol, Imdur, hydralazine are ordered.  e. Continue daily weights, intake and output, low-sodium diet  f. Aspirin 81mg PO daily if not otherwise contraindicated (anemia). This was not ordered.   g. Cardiology clinic follow-up as previously arranged on 3/14/2022.  2. Coronary artery disease, being managed medically due to comorbidities   3. improving acute on chronic renal insufficiency   4. Hypertension, adequately controlled at this time  5. Severe chronic anemia status post transfusion   6. severe dementia with superimposed delirium    Thank you for the consultation.  Cardiology will sign off at this time.    Any Sandoval MD    Interval History   89-year-old with dementia and improving acute systolic heart failure with moderately reduced ejection fraction, known mild to moderate aortic stenosis, chronic severe anemia was admitted for management of severe anemia.  He was given a blood transfusion and IV Lasix.  His renal function continues to improve.   His wife is not available and he is unable to provide further history, but he is lying flat and appears confused but comfortable.    He did have an episode of acute hypoxia in the emergency department possibly related to agitation or blood transfusion.    He is discharged from the hospital on 3/1/2022 after hospitalization for heart failure.    Clinically Significant Risk  Factors Present on Admission                Cardiovascular: End Stage Heart Failure    Fluid & Electrolyte Disorders: Fluid overload, unspecified and Transfusion associated circulatory overload  Nephrology: CKD POA List: Stage 4 (GFR 15-29)  Neurology: Encephalopathy: Encephalopathy, unspecified  Dementia: Alzheimer's disease, unspecified  Pulmonology: Pulmonary Heart Disease (Pulmonary hypertension or Cor pulmonale): Pulmonary hypertension, unspecified  Systemic: Chronic Fatigue and Other Debilities: Age-related physical debility  Bed confinement status  Chronic fatigue, unspecified  Limitation of activities due to disability  Neoplastic (malignant) related fatigue     Social history  .    Physical Exam   Temp: 98.1  F (36.7  C) Temp src: Temporal BP: 127/59 Pulse: 67   Resp: 25 SpO2: 98 % O2 Device: Nasal cannula Oxygen Delivery: 5 LPM  There were no vitals filed for this visit.  Vital Signs with Ranges  Temp:  [97.1  F (36.2  C)-98.1  F (36.7  C)] 98.1  F (36.7  C)  Pulse:  [] 67  Resp:  [13-36] 25  BP: (114-154)/() 127/59  SpO2:  [90 %-98 %] 98 %  I/O last 3 completed shifts:  In: 360   Out: 2800 [Urine:2800]     Past medical, past surgical, social and family history were reviewed in the chart.  I met his wife during his previous hospitalization.  She is not present currently.  He was discharged to a TCU.  The patient does not able to give additional history due to altered mental status.    Patient Active Problem List   Diagnosis     Acute systolic congestive heart failure (H)     Acute respiratory failure with hypoxia (H)     Delirium     Anemia, unspecified type     Congestive heart failure (CHF) (H)       Constitutional: Not distressed.  He is lying flat  Eyes: Clear sclera  ENT: Moist membranes  Respiratory: Faint crackles at bases but mostly clear  Cardiovascular: Regular with 2 out of 6 systolic murmur loudest at RUSB  GI: Bowel sounds are present   King draining clear yellow  urine  Skin: Resolving lower extremity edema, trace  Neurologic: Awake does not follow commands seems to shake head no when asked whether he has dyspnea or pain.  Moves all extremities spontaneously  Psychiatric: Appears delirious    Medications       acetaminophen  1,000 mg Oral TID     amLODIPine  5 mg Oral Daily     carvedilol  12.5 mg Oral BID     escitalopram  5 mg Oral Daily     furosemide  40 mg Intravenous Q12H     hydrALAZINE  25 mg Oral Q8H LACY     isosorbide mononitrate  30 mg Oral Daily     tamsulosin  0.4 mg Oral Daily       Data   Results for orders placed or performed during the hospital encounter of 03/03/22 (from the past 24 hour(s))   ABO/Rh type and screen    Narrative    The following orders were created for panel order ABO/Rh type and screen.  Procedure                               Abnormality         Status                     ---------                               -----------         ------                     Adult Type and Screen[[id number]]                            Final result                 Please view results for these tests on the individual orders.   CBC with platelets + differential    Narrative    The following orders were created for panel order CBC with platelets + differential.  Procedure                               Abnormality         Status                     ---------                               -----------         ------                     CBC with platelets and d...[[id number]]  Abnormal            Final result                 Please view results for these tests on the individual orders.   Comprehensive metabolic panel   Result Value Ref Range    Sodium 135 133 - 144 mmol/L    Potassium 4.1 3.4 - 5.3 mmol/L    Chloride 103 94 - 109 mmol/L    Carbon Dioxide (CO2) 25 20 - 32 mmol/L    Anion Gap 7 3 - 14 mmol/L    Urea Nitrogen 47 (H) 7 - 30 mg/dL    Creatinine 2.40 (H) 0.66 - 1.25 mg/dL    Calcium 8.7 8.5 - 10.1 mg/dL    Glucose 146 (H) 70 - 99 mg/dL    Alkaline  Phosphatase 74 40 - 150 U/L    AST 18 0 - 45 U/L    ALT 17 0 - 70 U/L    Protein Total 6.4 (L) 6.8 - 8.8 g/dL    Albumin 2.8 (L) 3.4 - 5.0 g/dL    Bilirubin Total 0.2 0.2 - 1.3 mg/dL    GFR Estimate 25 (L) >60 mL/min/1.73m2   Adult Type and Screen   Result Value Ref Range    ABO/RH(D) O POS     Antibody Screen Negative Negative    SPECIMEN EXPIRATION DATE [id number]    CBC with platelets and differential   Result Value Ref Range    WBC Count 9.0 4.0 - 11.0 10e3/uL    RBC Count 2.03 (L) 4.40 - 5.90 10e6/uL    Hemoglobin 6.8 (LL) 13.3 - 17.7 g/dL    Hematocrit 22.0 (L) 40.0 - 53.0 %     (H) 78 - 100 fL    MCH 33.5 (H) 26.5 - 33.0 pg    MCHC 30.9 (L) 31.5 - 36.5 g/dL    RDW 15.6 (H) 10.0 - 15.0 %    Platelet Count 205 150 - 450 10e3/uL    % Neutrophils 75 %    % Lymphocytes 11 %    % Monocytes 12 %    % Eosinophils 1 %    % Basophils 0 %    % Immature Granulocytes 1 %    NRBCs per 100 WBC 0 <1 /100    Absolute Neutrophils 6.8 1.6 - 8.3 10e3/uL    Absolute Lymphocytes 1.0 0.8 - 5.3 10e3/uL    Absolute Monocytes 1.1 0.0 - 1.3 10e3/uL    Absolute Eosinophils 0.1 0.0 - 0.7 10e3/uL    Absolute Basophils 0.0 0.0 - 0.2 10e3/uL    Absolute Immature Granulocytes 0.1 <=0.4 10e3/uL    Absolute NRBCs 0.0 10e3/uL   Prepare red blood cells (unit)   Result Value Ref Range    CROSSMATCH Compatible     UNIT ABO/RH O Pos     Unit Number [id number]     Unit Status Issued     Blood Component Type Red Blood Cells     Product Code [id number]     CODING SYSTEM [id number]     UNIT TYPE ISBT 5100     ISSUE DATE AND TIME [id number]    Asymptomatic COVID-19 Virus (Coronavirus) by PCR Nasopharyngeal    Specimen: Nasopharyngeal; Swab   Result Value Ref Range    SARS CoV2 PCR Negative Negative    Narrative    Testing was performed using the marcus  SARS-CoV-2 & Influenza A/B Assay on the marcus  [employer]  System.  This test should be ordered for the detection of SARS-COV-2 in individuals who meet SARS-CoV-2 clinical and/or epidemiological  criteria. Test performance is unknown in asymptomatic patients.  This test is for in vitro diagnostic use under the FDA EUA for laboratories certified under CLIA to perform moderate and/or high complexity testing. This test has not been FDA cleared or approved.  A negative test does not rule out the presence of PCR inhibitors in the specimen or target RNA in concentration below the limit of detection for the assay. The possibility of a false negative should be considered if the patient's recent exposure or clinical presentation suggests COVID-19.  [facility] are certified under the Clinical Laboratory Improvement Amendments of 1988 (CLIA-88) as qualified to perform moderate and/or high complexity laboratory testing.   XR Chest Port 1 View    Narrative    EXAM: XR CHEST PORT 1 VIEW  LOCATION: [facility]  DATE/TIME: 3/4/2022 1:15 AM    INDICATION: Hypoxia.  CHF  COMPARISON: 02/27/2022      Impression    IMPRESSION: Worsening in engorged indistinct central pulmonary vasculature with increased interstitial markings consistent with worsening interstitial pulmonary edema. Small bilateral pleural effusions. No pneumothorax. Stable borderline enlarged cardiac   silhouette.   CBC with platelets   Result Value Ref Range    WBC Count 10.5 4.0 - 11.0 10e3/uL    RBC Count 2.65 (L) 4.40 - 5.90 10e6/uL    Hemoglobin 8.4 (L) 13.3 - 17.7 g/dL    Hematocrit 26.5 (L) 40.0 - 53.0 %     78 - 100 fL    MCH 31.7 26.5 - 33.0 pg    MCHC 31.7 31.5 - 36.5 g/dL    RDW 18.0 (H) 10.0 - 15.0 %    Platelet Count 209 150 - 450 10e3/uL   Basic metabolic panel   Result Value Ref Range    Sodium 137 133 - 144 mmol/L    Potassium 3.6 3.4 - 5.3 mmol/L    Chloride 104 94 - 109 mmol/L    Carbon Dioxide (CO2) 28 20 - 32 mmol/L    Anion Gap 5 3 - 14 mmol/L    Urea Nitrogen 42 (H) 7 - 30 mg/dL    Creatinine 2.15 (H) 0.66 - 1.25 mg/dL    Calcium 8.6 8.5 - 10.1 mg/dL    Glucose 113 (H) 70 - 99 mg/dL    GFR  Estimate 29 (L) >60 mL/min/1.73m2     Previous EKGs and echo were reviewed.  There is not a current EKG or echo.

## 2022-03-04 NOTE — ED NOTES
[facility]  ED Nurse Handoff Report    ED Chief complaint: Abnormal Labs      ED Diagnosis:   Final diagnoses:   Anemia, unspecified type   Delirium       Code Status: DNI    Allergies:   Allergies   Allergen Reactions     Lactose Unknown     Memantine Other (See Comments)     Ampicillin Other (See Comments) and Unknown     Unknown, states he hasn't had it in 40 yrs         Rosuvastatin Other (See Comments) and Unknown     myositis           Patient Story: low hgb  Focused Assessment:  89 year old male with history of hyperlipidemia, hypertension, acute CHF, and skin cancer who presents with abnormal labs. Per triage note, the patient presents from his nursing home due to concern for low hemoglobin levels. At this time, he has no complaints. He denies lightheadedness or any bleeding problems. Pt confused and pulling at lines, pulls at toussaint and IV.  Needs close monitoring.    Treatments and/or interventions provided: blood, ativan, haldol  Medications   lidocaine (XYLOCAINE) 2 % external gel 10 mL (6 mLs Urethral Given 3/3/22 2123)   LORazepam (ATIVAN) injection 0.5 mg (0.5 mg Intravenous Given 3/3/22 2123)   haloperidol lactate (HALDOL) injection 2 mg (2 mg Intravenous Given 3/3/22 2237)   OLANZapine zydis (zyPREXA) ODT tab 5 mg (5 mg Oral Given 3/3/22 2345)   furosemide (LASIX) injection 40 mg (40 mg Intravenous Given 3/3/22 2347)         Patient's response to treatments and/or interventions:   O2 desat following ativan.  Currently on oxymask 12 Lt.  Soft wrist restraints.       To be done/followed up on inpatient unit:        Does this patient have any cognitive concerns?: Baseline dementia    Activity level - Baseline/Home:  Unknown  Activity Level - Current:   Unknown    Patient's Preferred language: English   Needed?: No    Isolation: None  Infection: Not Applicable  Patient tested for COVID 19 prior to admission: YES  Bariatric?: No    Vital Signs:   Vitals:    03/03/22 2108  03/03/22 2115 03/03/22 2130 03/03/22 2200   BP: (!) 137/106  (!) 138/27 (!) 138/107   Pulse: 67   72   Resp:    16   Temp: 97.3  F (36.3  C)   97.9  F (36.6  C)   TempSrc:       SpO2:  90%         Cardiac Rhythm:     Was the PSS-3 completed:   Yes  What interventions are required if any?               Family Comments:     OBS brochure/video discussed/provided to patient/family: N/A              Name of person given brochure if not patient:                 Relationship to patient:       For the majority of the shift this patient's behavior was Yellow.   Behavioral interventions performed were lots of redirecting and stopping form pulling at lines, may need a sitter.    ED NURSE PHONE NUMBER: [id number]

## 2022-03-04 NOTE — PHARMACY-ADMISSION MEDICATION HISTORY
Pharmacy Medication History  Admission medication history interview status for the 3/3/2022  admission is complete. See EPIC admission navigator for prior to admission medications     Location of Interview: Patient room  Medication history sources: MAR ( )    Significant changes made to the medication list:       In the past week, patient estimated taking medication this percent of the time: greater than 90%    Additional medication history information:        Medication reconciliation completed by provider prior to medication history? No    Time spent in this activity: 15min    Prior to Admission medications    Medication Sig Last Dose Taking? Auth Provider   acetaminophen (TYLENOL) 500 MG tablet Take 1,000 mg by mouth 3 times daily  3/3/2022 at am Yes Unknown, Entered By History   amLODIPine (NORVASC) 5 MG tablet Take 1 tablet (5 mg) by mouth daily 3/3/2022 at am Yes Wang Garner DO   calcium carbonate (TUMS) 500 MG chewable tablet Take 1 chew tab by mouth 2 times daily as needed for heartburn prn Yes Unknown, Entered By History   carvedilol (COREG) 12.5 MG tablet Take 12.5 mg by mouth 2 times daily 3/3/2022 at am Yes Unknown, Entered By History   escitalopram (LEXAPRO) 5 MG tablet Take 5 mg by mouth daily 3/3/2022 at m Yes Unknown, Entered By History   furosemide (LASIX) 40 MG tablet Take 1 tablet (40 mg) by mouth daily 3/3/2022 at am Yes Wang Garner DO   hydrALAZINE (APRESOLINE) 25 MG tablet Take 1 tablet (25 mg) by mouth every 8 hours  Patient taking differently: Take 25 mg by mouth every 8 hours 0000,0800,1600 3/3/2022 at [id number] Yes Wang Garner DO   isosorbide mononitrate (IMDUR) 30 MG 24 hr tablet Take 1 tablet (30 mg) by mouth daily 3/3/2022 at am Yes Wang Garner DO   nitroGLYcerin (NITROSTAT) 0.4 MG sublingual tablet For chest pain place 1 tablet under the tongue every 5 minutes for 3 doses. If symptoms persist 5 minutes after 1st dose call 911. prn Yes  Wang Garner, DO   polyethylene glycol-propylene glycol (SYSTANE ULTRA) 0.4-0.3 % SOLN ophthalmic solution Place 1 drop into both eyes every hour as needed for dry eyes prn Yes Unknown, Entered By History   potassium chloride ER (KLOR-CON M) 20 MEQ CR tablet Take 20 mEq by mouth 2 times daily 3/3/2022 at am Yes Unknown, Entered By History   sennosides (SENOKOT) 8.6 MG tablet Take 1-2 tablets by mouth 2 times daily as needed for constipation prn Yes Wang Garner,    simethicone (MYLICON) 80 MG chewable tablet Take 1 tablet (80 mg) by mouth every 6 hours as needed for cramping prn Yes Wang Garner,    tamsulosin (FLOMAX) 0.4 MG capsule Take 1 capsule (0.4 mg) by mouth daily 3/3/2022 at am Yes Wang Garner,    vitamin B-12 (CYANOCOBALAMIN) 1000 MCG tablet Take 1 tablet by mouth daily 3/3/2022 at am Yes Unknown, Entered By History       The information provided in this note is only as accurate as the sources available at the time of update(s)

## 2022-03-04 NOTE — ED NOTES
Patient transferred to hospital bed for comfort.  Transferred x4 staff without issue.  Soft wrist restraints continued.  Continues to try to pull at catheter when restraints are removed to reposition in bed.

## 2022-03-04 NOTE — H&P
[facility]    History and Physical - Hospitalist Service       Date of Admission:  3/3/2022    Assessment & Plan   Irvin Bridges is an 89 year-old male with history of dementia, chronic kidney disease stage 4 and aortic atherosclerosis who presents via EMS with shortness of breath. Admitted 3/3/2022.    Acute on chronic systolic congestive heart failure (HFrEF)  Acute hypoxic respiratory failure secondary to flash pulmonary edema   Hypertension   *Brought in to ED on 2L nasal cannula by EMS, though per notes not clear he was ever hypoxic prior to this  *Following episode of agitation and administration of sedatives, was placed on non-rebreather for reported oxygen saturations of 80%.   *CXR with worsening in engorged indistinct central pulmonary vasculature with increased interstitial markings consistent with worsening interstitial pulmonary edema  *Acute hypoxia likely multifactorial from sedation and flash pulmonary edema potentially caused by severe agitation versus blood transfusion  *Recently admitted 2/24-3/1/22 for acute hypoxic respiratory failure due to acute systolic CHF with pulmonary edema, probable type II NSTEMI for above. Medications adjusted by cardiology including initiating hydralazine and Imdur, discharged on PO furosemide  *Echocardiogram 2/25 with EF 35-50%, early diastolic dysfunction   - Continue furosemide 60 mg IV x2  - Cardiology consulted given re-admission  - Daily weights, strict I/O  - Continue prior to admission amlodipine, carvedilol, furosemide, hydralazine, Imdur  - Daily BMP while diuresing    Acute on chronic macrocytic anemia  *CBC drawn at TCU initially at 6.5 g/dl, recheck here 6.8 g/dl. Reportedly had some hematuria at TCU with clot noted at King tip, that resolved with removal.   *Hemoglobin in low 7 g/dls during most recent hospitalization. Iron studies not suggestive of deficiency, folate and B12 within normal limits.    - Transfused 1 unit in  ED  - Repeat CBC in AM     Dementia with behavioral disturbance  While receiving cares in the ED, patient pulled out his catheter and IV and required IV Haldol and IV lorazepam. Likely due to sundowning, otherwise was not complaining of symptoms prior to this and was reportedly at his baseline.   - Olanzapine ODT PRN. Attempt to avoid IV medications as these may delay discharge back to facility   - Sitter, restraints as needed   - SW consulted for discharge back to facility   - Re-orient as needed  - Maintain normal day/night, sleep wake cycles  - Minimize sedating/altering medications as able  - Treat separate conditions as detailed above/below     Urinary retention  Meatus bleeding secondary to trauma   King placed 2/26 due to retention, discharged with King with plan for TOV at TCU.  - Pulled King in TCU with some bleeding from balloon trauma   - King replaced in ED and bleeding resolved  - Continue tamsulosin     Chronic kidney disease, stage 4  Baseline creatinine 2.3-2.4 with GFR in 20s. Creatinine 2.4 on admission.   - Currently around baseline  - Avoid nephrotoxins     Depression  - Continue prior to admission escitalopram    Chronic low back pain  No acute change.   - Continue scheduled acetaminophen        Diet:   2 gram sodium restricted diet   DVT Prophylaxis: Pneumatic Compression Devices  King Catheter: PRESENT, indication:    Code Status:   Unable to discuss with patient. Ordered DNI status as previously documented.       Disposition Plan   Admit to inpatient, anticipate >2 midnights prior to discharge.     Entered: Ander King MD 03/03/2022, 10:44 PM     The patient's care was discussed with the Patient and bedside RN.    Clinically Significant Risk Factors Present on Admission                          Ander King MD  [facility]    ______________________________________________________________________    Chief Complaint   Low hemoglobin    History of Present  Illness   Irvin Bridges is a 89 year old male who presents with the above chief complaint.    Unable to obtain history from the patient due to his dementia/delirium. Per review of chart and facility paperwork, he was recently admitted 2/24-3/1/22 for acute hypoxic respiratory failure due to acute systolic CHF with pulmonary edema, probable type II NSTEMI for above. Medications adjusted by cardiology including initiating hydralazine and Imdur, discharged on PO furosemide. He also had urinary retention noted, with King placed and discharge with King for trial of void at TCU.  Per TCU notes, he had hematuria noted there, was noted to have a large clot at the tip of his King which was removed and this subsequently resolved.  He had a hemoglobin check which was noted to be 6.5 g/dL, and therefore was sent into the emergency department to facilitate transfusion    In the Emergency Department, the patient was seen by Dr. Lucas, with whom I discussed the patient's presenting symptoms and emergency department course.  Initial vital signs were a temperature of 97.1F, HR 64, /66, RR 93%, SpO2  on 2L nasal cannula. Work-up included hemoglobin of 6.8 g/dl. The patient was transfused 1 unit of blood with intention to observe him and potentially discharge back to his TCU, however the patient became more confused and agitated, pulling out his IV and his catheter causing trauma and bleeding to his meatus. He received IV haloperidol and lorazepam for sedation, and subsequently desaturated into the 80s per report, requiring non brebreather . Hospitalists were contacted for admission to the hospital.     Review of Systems    Complete 10 point review of systems assessed and negative except as noted in HPI.    Past Medical History    I have reviewed this patient's medical history and updated it with pertinent information if needed.   Past Medical History:   Diagnosis Date     Aortic atherosclerosis (H)      Chronic kidney  disease, stage 4 (severe) (H)      Chronic low back pain      Chronic systolic heart failure (H)      Dementia (H)      Depression      Macrocytic anemia      Urinary retention        Social History   I have reviewed this patient's social history and updated it with pertinent information if needed.  Social History     Tobacco Use     Smoking status: Never Smoker     Smokeless tobacco: Not on file   Substance Use Topics     Alcohol use: Not Currently     Drug use: Not on file     . Currently in TCU.     Family History   I have reviewed this patient's family history and updated it with pertinent information if needed.   Family History   Problem Relation Age of Onset     Alzheimer Disease Mother      Heart Disease Father      Meniere's disease Sister        Prior to Admission Medications   Prior to Admission Medications   Prescriptions Last Dose Informant Patient Reported? Taking?   acetaminophen (TYLENOL) 500 MG tablet 3/3/2022 at am  Yes Yes   Sig: Take 1,000 mg by mouth 3 times daily    amLODIPine (NORVASC) 5 MG tablet 3/3/2022 at am  No Yes   Sig: Take 1 tablet (5 mg) by mouth daily   calcium carbonate (TUMS) 500 MG chewable tablet prn  Yes Yes   Sig: Take 1 chew tab by mouth 2 times daily as needed for heartburn   carvedilol (COREG) 12.5 MG tablet 3/3/2022 at am  Yes Yes   Sig: Take 12.5 mg by mouth 2 times daily   escitalopram (LEXAPRO) 5 MG tablet 3/3/2022 at m  Yes Yes   Sig: Take 5 mg by mouth daily   furosemide (LASIX) 40 MG tablet 3/3/2022 at am  No Yes   Sig: Take 1 tablet (40 mg) by mouth daily   hydrALAZINE (APRESOLINE) 25 MG tablet 3/3/2022 at [id number]  No Yes   Sig: Take 1 tablet (25 mg) by mouth every 8 hours   Patient taking differently: Take 25 mg by mouth every 8 hours 0000,0800,1600   isosorbide mononitrate (IMDUR) 30 MG 24 hr tablet 3/3/2022 at am  No Yes   Sig: Take 1 tablet (30 mg) by mouth daily   nitroGLYcerin (NITROSTAT) 0.4 MG sublingual tablet prn  No Yes   Sig: For chest pain place 1  tablet under the tongue every 5 minutes for 3 doses. If symptoms persist 5 minutes after 1st dose call 911.   polyethylene glycol-propylene glycol (SYSTANE ULTRA) 0.4-0.3 % SOLN ophthalmic solution prn  Yes Yes   Sig: Place 1 drop into both eyes every hour as needed for dry eyes   potassium chloride ER (KLOR-CON M) 20 MEQ CR tablet 3/3/2022 at am  Yes Yes   Sig: Take 20 mEq by mouth 2 times daily   sennosides (SENOKOT) 8.6 MG tablet prn  No Yes   Sig: Take 1-2 tablets by mouth 2 times daily as needed for constipation   simethicone (MYLICON) 80 MG chewable tablet prn  No Yes   Sig: Take 1 tablet (80 mg) by mouth every 6 hours as needed for cramping   tamsulosin (FLOMAX) 0.4 MG capsule 3/3/2022 at am  No Yes   Sig: Take 1 capsule (0.4 mg) by mouth daily   vitamin B-12 (CYANOCOBALAMIN) 1000 MCG tablet 3/3/2022 at am  Yes Yes   Sig: Take 1 tablet by mouth daily      Facility-Administered Medications: None     Allergies   Allergies   Allergen Reactions     Lactose Unknown     Memantine Other (See Comments)     Ampicillin Other (See Comments) and Unknown     Unknown, states he hasn't had it in 40 yrs         Rosuvastatin Other (See Comments) and Unknown     myositis           Physical Exam   Vital Signs: Temp: 97.3  F (36.3  C) Temp src: Temporal BP: (!) 138/27 Pulse: 67   Resp: 16 SpO2: 90 % O2 Device: Nasal cannula Oxygen Delivery: 2 LPM  Weight: 0 lbs 0 oz    Constitutional: Chronically ill appearing, NAD  Eyes: PERRL, EOMI  HENT: Oropharynx clear, MMM  Respiratory: Clear to auscultation bilaterally, normal effort at rest  Cardiovascular: RRR, no m/r/g. No peripheral edema.    GI: Soft, non-tender, non-distended. No rebound tenderness or guarding.   Skin: Warm, dry  Neurologic: Alert. Does not intelligibly respond to questions.  Moving all extremities equally.    Psychiatric: Mildly agitated, frequently attempting to pull at catheter      Data   Data reviewed today: I reviewed all medications, new labs and imaging  results over the last 24 hours. I personally reviewed no images or EKG's today.    Recent Labs   Lab 03/03/22  1753 03/03/22  1013 03/01/22  0526 03/01/22  0525 02/28/22  1403 02/28/22  0524   WBC 9.0 10.5  --   --   --  7.6   HGB 6.8* 6.5*  --   --   --  7.1*   * 106*  --   --   --  105*    190 216  --   --  211     --   --  132*  --  135   POTASSIUM 4.1  --   --  3.7 4.4 3.3*   CHLORIDE 103  --   --  100  --  104   CO2 25  --   --  24  --  24   BUN 47*  --   --  52*  --  49*   CR 2.40*  --   --  2.30*  --  2.33*   ANIONGAP 7  --   --  8  --  7   ABHI 8.7  --   --  8.7  --  8.0*   *  --   --  106*  --  101*   ALBUMIN 2.8*  --   --   --   --   --    PROTTOTAL 6.4*  --   --   --   --   --    BILITOTAL 0.2  --   --   --   --   --    ALKPHOS 74  --   --   --   --   --    ALT 17  --   --   --   --   --    AST 18  --   --   --   --   --        No results found for this or any previous visit (from the past 24 hour(s)).

## 2022-03-05 NOTE — PROGRESS NOTES
[facility]    Medicine Progress Note - Hospitalist Service    Date of Admission:  3/3/2022    Assessment & Plan        Irvin Bridges is an 89 year-old male with history of dementia, chronic kidney disease stage 4 and aortic atherosclerosis who presents via EMS with shortness of breath. Admitted 3/3/2022.     Acute on chronic systolic congestive heart failure (HFrEF)  Acute hypoxic respiratory failure secondary to flash pulmonary edema   Hypertension   Brought in to ED on 2L nasal cannula by EMS, though per notes not clear he was ever hypoxic prior to this  Following episode of agitation and administration of sedatives, was placed on non-rebreather for reported oxygen saturations of 80%.   *CXR with worsening in engorged indistinct central pulmonary vasculature with increased interstitial markings consistent with worsening interstitial pulmonary edema  Acute hypoxia likely multifactorial from sedation and flash pulmonary edema potentially caused by severe agitation versus blood transfusion  Recently admitted 2/24-3/1/22 for acute hypoxic respiratory failure due to acute systolic CHF with pulmonary edema, probable type II NSTEMI for above. Medications adjusted by cardiology including initiating hydralazine and Imdur, discharged on PO furosemide  Echocardiogram 2/25 with EF 35-50%, early diastolic dysfunction   - Continue diuresis per cards (on IV lasix)  - Daily weights, strict I/O  - Continue prior to admission amlodipine, carvedilol, furosemide, hydralazine, Imdur (when able to take PO)  - Daily BMP while diuresing  - Follow cards recs (appreciate assistance).     Acute on chronic macrocytic anemia  *CBC drawn at TCU initially at 6.5 g/dl, recheck here 6.8 g/dl. Reportedly had some hematuria at TCU with clot noted at King tip, that resolved with removal.   *Hemoglobin in low 7 g/dls during most recent hospitalization. Iron studies not suggestive of deficiency, folate and B12 within  normal limits.    - Transfused 1 unit in ED and 3/4 Hgb 8.4--9.1 this am  - follow CBC     Dementia with behavioral disturbance  Calm and cooperative.  - Delirum bundle.  - Prn meds.     Urinary retention  Meatus bleeding secondary to trauma   King placed 2/26 due to retention, discharged with King with plan for TOV at TCU.  - Pulled King in TCU with some bleeding from balloon trauma   - King replaced in ED and bleeding resolved  - Continue tamsulosin     Chronic kidney disease, stage 4  Baseline creatinine 2.3-2.4 with GFR in 20s. Creatinine 2.4 on admission--2.04 this am   - Currently around baseline  - Avoid nephrotoxins     Depression  - Continue prior to admission escitalopram     Chronic low back pain  No acute change.   - Continue scheduled acetaminophen          Diet: 2 Gram Sodium Diet Other - please comment    DVT Prophylaxis: Pneumatic Compression Devices  King Catheter: PRESENT, indication: Retention  Central Lines: None  Cardiac Monitoring: ACTIVE order. Indication: Acute decompensated heart failure (48 hours)  Code Status:  Ordered DNI status as previously documented.       Disposition Plan   Expected Discharge: 2 days possibly       The patient's care was discussed with the Bedside Nurse and Patient and dtr./    Sherry Bajwa MD  Hospitalist Service  [facility]        ______________________________________________________________________    Interval History   Calm and cooperative. Denies pain/sob.     Physical Exam   Vital Signs: Temp: 99  F (37.2  C) Temp src: Axillary BP: 137/69 Pulse: 86   Resp: 20 SpO2: 95 % O2 Device: Oxymask Oxygen Delivery: 3 LPM  Weight: 127 lbs 11.2 oz    Gen: NAD, elderly, confused  HEENT: Normocephalic, EOMI, MMM  Resp: no focal crackles,  no wheezes, no increased work of resp  CV: S1S2 heard, reg rhythm, reg rate  Abdo: soft, nontender, nondistended, bowel sounds present  Ext: calves nontender, well perfused  Neuro: AAO, baseline dementia  noted , CN grossly intact, no facial asymmetry      Data   Recent Labs   Lab 03/05/22  0601 03/04/22  1909 03/04/22  0643 03/03/22  1753   WBC 11.1*  --  10.5 9.0   HGB 9.1*  --  8.4* 6.8*     --  100 108*     --  209 205     --  137 135   POTASSIUM 3.2* 3.4 3.6 4.1   CHLORIDE 99  --  104 103   CO2 30  --  28 25   BUN 36*  --  42* 47*   CR 2.06*  --  2.15* 2.40*   ANIONGAP 8  --  5 7   ABHI 8.6  --  8.6 8.7   *  --  113* 146*   ALBUMIN  --   --   --  2.8*   PROTTOTAL  --   --   --  6.4*   BILITOTAL  --   --   --  0.2   ALKPHOS  --   --   --  74   ALT  --   --   --  17   AST  --   --   --  18     No results found for this or any previous visit (from the past 24 hour(s)).

## 2022-03-05 NOTE — PLAN OF CARE
OT: Attempted evaluation per MD orders.  Pt sleeping but woke up with moderate stimulation.  Complaining of B sore shoulders.  Worked with the sitter to try and get him up to move from bed to chair.  Mod A to get up to EOB.  Pt almost immediately started complaining of dizziness, with tremors.  Some were moderate and involved his whole trunk.  BP taken, was 124/98.  HR  Was 78, dropped to 68 at one point but stayed steady.  Shaking not stopping, brought pt back to supine where he said he felt better.  Nurse informed.

## 2022-03-05 NOTE — CONSULTS
"CLINICAL NUTRITION SERVICES  -  ASSESSMENT NOTE    Recommendations Ordered by Registered Dietitian (RD):   2g Na diet  Reviewed protein sources  Pt dislikes protein supplements per spouse (does drink milkshakes)   Thera vit M daily    Malnutrition:   % Weight Loss:  Up to 5% in 1 month (moderate malnutrition)  % Intake:  <75% for > 7 days (moderate malnutrition)  Subcutaneous Fat Loss:  Orbital region mild depletion and Upper arm region mild depletion  Muscle Loss:  Temporal region mild depletion and Clavicle bone region mild depletion  Fluid Retention:  None noted    Malnutrition Diagnosis: Moderate malnutrition  In Context of:  Acute illness or injury  Chronic illness or disease     REASON FOR ASSESSMENT  Irvin Bridges is a 89 year old male seen by Registered Dietitian for Nutrition Admission Risk Screen Received -   Have you recently lost weight without trying in the last 6 months ? - \"unsure\"  Have you been eating poorly due to a decreased appetite ?- \"yes\"    NUTRITION HISTORY  - Information obtained from pt's spouse and EMR.   - H/o dementia, CKD4, aortic atherosclerosis. Admitted with shortness of breath.   - Pt/spouse seen in room. Pt was lethargic, so did not participate. His spouse reports that he is typically quite a good eater, but lately has not been eating as well.     - Appears pt was eating fairly well during recent admission: % of meals recorded(unable to determine nutrient density or frequency of meals however).     CURRENT NUTRITION ORDERS  Diet Order:     2000 mg Sodium     Current Intake/Tolerance:  Pt received a deli sandwich and turkey for lunch during visit. He did eat mashed potatoes and eggs for dinner yesterday. This morning consumed 100% of Portuguese toast, fresh fruit, turkey sausage.     NUTRITION FOCUSED PHYSICAL ASSESSMENT FOR DIAGNOSING MALNUTRITION)  Yes - Visual only          Observed:    Muscle wasting (refer to documentation in Malnutrition section) and Subcutaneous fat " "loss (refer to documentation in Malnutrition section)    Obtained from Chart/Interdisciplinary Team:  No BM since PTA    ANTHROPOMETRICS  Height: 5' 5\" (per spouse)  Weight: 127 lbs 11.2 oz (57.9 kg)   Body mass index is 21.25 kg/m .  Weight Status:  Normal BMI  IBW: 61.8 kg   % IBW: 94%  Weight History: ?7# loss since last admission (5.2%) - suspect at least some degree related to changes in fluid shifts.   Wt Readings from Last 10 Encounters:   03/05/22 57.9 kg (127 lb 11.2 oz)   03/01/22 63 kg (138 lb 14.4 oz)     Wt trending last admission -   Date/Time Weight Who   03/01/22 0641 63 kg (138 lb 14.4 oz) KK   02/28/22 0533 60.8 kg (134 lb) AB   02/27/22 0605 63 kg (139 lb) TM   02/26/22 0645 64.6 kg (142 lb 6.4 oz) AB   02/25/22 0700 68.9 kg (152 lb) HD   02/24/22 1739 65.8 kg (145 lb)      LABS  Labs reviewed  K 3.2 (L) --> 3.4 (NL)  Recent Labs   Lab 03/05/22  0601 03/04/22  0643 03/03/22  1753 03/01/22  0525 02/28/22  0524 02/27/22  1817   * 113* 146* 106* 101* 122*       MEDICATIONS  Medications reviewed  Vitamin B12  Lasix 40 mg BID    ASSESSED NUTRITION NEEDS PER APPROVED PRACTICE GUIDELINES:  Dosing Weight 57.9 kg   Estimated Energy Needs: 5556-5227 kcals (25-30 Kcal/Kg)  Justification: maintenance and repletion  Estimated Protein Needs: 70-87 grams protein (1.2-1.5 g pro/Kg)  Justification: maintenance and Repletion  Estimated Fluid Needs:1 mL/kcal for hydration or per MD    MALNUTRITION:  % Weight Loss:  Up to 5% in 1 month (moderate malnutrition)  % Intake:  <75% for > 7 days (moderate malnutrition)  Subcutaneous Fat Loss:  Orbital region mild depletion and Upper arm region mild depletion  Muscle Loss:  Temporal region mild depletion and Clavicle bone region mild depletion  Fluid Retention:  None noted    Malnutrition Diagnosis: Moderate malnutrition  In Context of:  Acute illness or injury  Chronic illness or disease    NUTRITION DIAGNOSIS:  Inadequate oral intake related to fluctuations in " mental status, reported reduced appetite as evidenced by spouse concerned that pt is eating less than baseline since last hospitalization.     NUTRITION INTERVENTIONS  Recommendations / Nutrition Prescription  2g Na diet  Reviewed protein sources  Pt dislikes protein supplements per spouse (does drink milkshakes)   Thera vit M daily       Implementation  Nutrition education: Provided education on protein sources   Multivitamin/Mineral: ordered     Nutrition Goals  Intake of at least 75% meals TID.     MONITORING AND EVALUATION:  Progress towards goals will be monitored and evaluated per protocol and Practice Guidelines    Casi Dennis RD, LD  [facility], [facility], [facility]  Pager: [phone]  Weekend Pager: [phone]

## 2022-03-05 NOTE — PLAN OF CARE
Goal Outcome Evaluation:    Plan of Care Reviewed With: patient, spouse     Pt arrived from the ED at the start of evening shift. Lethargic but easily aroused. Confused; disoriented to time, place, & situation. Restless, moaning, c/o generalized BUE pain when touched/repositioned. Able to calm down/rest after administration of tylenol suppository. Unable to give PO meds d/t lethargy. VSS; O2 at 4L/ pt noted to have brief episodes of apnea while sleeping/snoring.Tele SR with PVC. Diuresing well with IV lasix. Urine out put > 4000 ml so far during the day; K+ recheck ordered/ pending. Small bloody drainage at the tip of penis but urine color clear yellow. Soft wrist restraints on BUEs d/t pt attempts to pull lines/toussaint cath. Blanchable redness on perineum/coccyx/buttock; barrier cream applied. Turned/ repositioned; spouse at bedside; updated. Will continue to monitor.

## 2022-03-05 NOTE — PLAN OF CARE
Pt admitted with acute on chronic CHF. Alert and oriented x2 - d/o to place and situation. VSS. O2 titrated down to 2 L NC (88% on RA). Tele SR with occasional PVCs. CMS intact. Lung sounds diminished. C/o pain in low back and bilateral shoulders - improved with PRN tylenol, repositioning and cold packs. Toussaint patent with adequate o/p.Small amt of blood noted at meatus. Tolerating regular diet. 2000 ml fluid restriction. Sitter at the bedside d/t pt's confusion and pulling at toussaint. Potassium replaced per protocol. Recheck scheduled for this afternoon. Coccyx red, blanchable. Foam dressing applied and repositioning q 2 hrs. Spouse and daughter at the bedside, supportive of pt.        1645: Pt transferred to medical floor. Report provided to receiving RN, and pt accompanied to new room. Pt's daughter and spouse updated by phone.

## 2022-03-05 NOTE — PLAN OF CARE
Neuro: A/O to self at times to time, confused, forgetful intermittently lethargic  Tele/cardiac: SR, occassional PVC  Respiration: 3L NC/Facemask, brief apneic episodes when asleep  Activity: has sitter, A2  Pain: Lower back, relieved with tylenol  Drips: none  Drains/tubes: 1 PIV saline locked  Skin: blanchable redness to coccyx/perinium, barrier applied  GI/: toussaint with good UOP, diuresing with lasix, No BM this shift  Aggression color: green, bilateral soft wrist restraints off, may need to be reordered if necessary  Isolation: none,     Hypokalemic this AM, Potassium drip ordered per protocal

## 2022-03-06 NOTE — PROGRESS NOTES
A&O x2, disorientated to place and situation., VSS on 1L NC. Ax2, repo. Sitter in room, pt likes to tug at O2 and toussaint. Toussaint draining well, meatus is slight red. Low Na diet, 2000 ml restriction. RRT called today for decreased LOC, LR bolus given for low Bps. Pt stable now, last BP is 104/53. Pt is awake and comfortable. Tele is BBB. OT/PT ordered. Try to wean off O2.

## 2022-03-06 NOTE — PROVIDER NOTIFICATION
MD Notification    Notified Person: MD    Notified Person Name: Sherry Hubbard    Notification Date/Time: 03/06/22 4:07 PM    Notification Interaction: Web Page    Purpose of Notification:  Pt having shoulder pain. Tylenol not helping. Any possibility of lidocaine patches? Thanks, Qi    Orders Received:    Comments:

## 2022-03-06 NOTE — PROGRESS NOTES
Shift Note: 03/05/2022 2917-5199  A/O x1-2, self and month only. VSS on 1-2L O2 via NC (wean as tolerated, 88% on RA). Murmur detected on auscultation. Na 2g restriction, 2L fl restriction. King for retention. 1:1 sitter for pulling at King, IV. LUE PIV SL. Redness/Peeling at coccyx, Mepi in place, turn/repo q2h, x2. No BM since arrived on unit, 1 large soft BM during days per report. On K+ replacement protocol, replaced today, recheck ordered for this evening. Discharge TBD.

## 2022-03-06 NOTE — PLAN OF CARE
Goal Outcome Evaluation:                    Summary:     Patient Name: Irvin Bridges Room#: 0259/0259-01   Admit Date: 3/3/2022  Primary Diagnosis: Anemia, unspecified type  Delirium  Inpatient Status:    Needed? No  Procedures This Admit: n/a  Drips: n/a  Drains & Devices: n/a  Rhythm:   Activity Level: assist of 2 for repo in bed   Oxygen needs: 1 LPM   Important Labs Since Admit:   Significant Echo results:   Anticipated D/C Date:       Requiring Extra Time? (Please explain):  Sitter at bed side at all times due to delirium, confusion.     Other Concerns:         A&O to self, VSS needing 1 LPM to maintain SaO2. King for retention. Mepilex to coccyx for red/blanchable area. Client continues to have confusion, attempting to get out of bed. Needing 1:1 sitter at all times do to this. Able to redirect. PRN Zyprexa given at 0000, not effective, up until aprox. 0400.

## 2022-03-06 NOTE — PROGRESS NOTES
03/06/22 1158   Quick Adds   Type of Visit Initial Occupational Therapy Evaluation   Living Environment   People in Home spouse   Current Living Arrangements condominium   Home Accessibility no concerns   Transportation Anticipated health plan transportation   Living Environment Comments most info gathered from previous eval   Self-Care   Usual Activity Tolerance moderate   Current Activity Tolerance fair   Regular Exercise No   Activity/Exercise/Self-Care Comment pt states he doesn't use a walker, but notes indicate he uses a walker at baseline   Instrumental Activities of Daily Living (IADL)   IADL Comments per notes wife manages IADL's   General Information   Onset of Illness/Injury or Date of Surgery 03/03/22   Referring Physician Ander King MD   Patient/Family Therapy Goal Statement (OT) none stated   Additional Occupational Profile Info/Pertinent History of Current Problem Irvin Bridges is an 89 year-old male with history of dementia, chronic kidney disease stage 4 and aortic atherosclerosis who presents via EMS with shortness of breath.   Performance Patterns (Routines, Roles, Habits) pt with baseline dx of dementia   Existing Precautions/Restrictions fall   Limitations/Impairments safety/cognitive   General Observations and Info pt supine in bed, agreeable to OT eval. sitter present   Cognitive Status Examination   Orientation Status place   Affect/Mental Status (Cognitive) confused  (baseline dementia)   Follows Commands follows one-step commands   Cognitive Status Comments pt with baseline dementia, follow commands and very cooperative   Visual Perception   Visual Impairment/Limitations corrective lenses full-time   Impact of Vision Impairment on Function (Vision) was able to read the clock   Sensory   Sensory Quick Adds No deficits were identified   Pain Assessment   Patient Currently in Pain Yes, see Vital Sign flowsheet  (L knee pain)   Integumentary/Edema   Integumentary/Edema no deficits were  identifed   Range of Motion Comprehensive   Comment, General Range of Motion limited shoulder flexion due to pain in B shoulders   Strength Comprehensive (MMT)   Comment, General Manual Muscle Testing (MMT) Assessment elbow to digit 4/5   Muscle Tone Assessment   Muscle Tone Quick Adds No deficits were identified   Coordination   Upper Extremity Coordination No deficits were identified   Coordination Comments occasional tremors noted   Bed Mobility   Comment (Bed Mobility) min A   Balance   Balance Comments decreased standing tolerance due to pain in L knee   Clinical Impression   Criteria for Skilled Therapeutic Interventions Met (OT) Yes, treatment indicated   OT Diagnosis decreased I with ADL's and functional mobility   Influenced by the following impairments decreased strength, cognition, and functional transfers   OT Problem List-Impairments impacting ADL problems related to;activity tolerance impaired;balance;cognition;strength;pain   Assessment of Occupational Performance 1-3 Performance Deficits   Identified Performance Deficits decreased dressing, BR transfers   Planned Therapy Interventions (OT) ADL retraining;bed mobility training;cognition;strengthening;transfer training   Clinical Decision Making Complexity (OT) low complexity   Risk & Benefits of therapy have been explained evaluation/treatment results reviewed;care plan/treatment goals reviewed;risks/benefits reviewed;current/potential barriers reviewed;participants voiced agreement with care plan;participants included;patient   OT Discharge Planning   OT Discharge Recommendation (DC Rec) Transitional Care Facility   OT Rationale for DC Rec pt is below baseline, unable to ambulate at this time due to L knee pain. would benefit from TCU for continued therapy prior to return home with his wife   Total Evaluation Time (Minutes)   Total Evaluation Time (Minutes) 10   OT Goals   Therapy Frequency (OT) 4 times/wk   OT Predicated Duration/Target Date for  Goal Attainment 03/13/22   OT Goals Hygiene/Grooming;Lower Body Dressing;Upper Body Bathing;Lower Body Bathing;Toilet Transfer/Toileting   OT: Hygiene/Grooming modified independent;while standing   OT: Upper Body Dressing Modified independent;including set-up/clothing retrieval   OT: Lower Body Dressing Supervision/stand-by assist;including set-up/clothing retrieval   OT: Upper Body Bathing Supervision/stand-by assist   OT: Lower Body Bathing Supervision/stand-by assist   OT: Toilet Transfer/Toileting Modified independent;toilet transfer;cleaning and garment management

## 2022-03-06 NOTE — CODE/RAPID RESPONSE
House officer note:  RRT called for decreased level of consciousness, extremity twitching, and SBP in 50s.  Head of the bed lowered flat and straight leg raise robustly positive.  Irvin has been receiving aggressive diuresis for acute hypoxic respiratory failure second heart failure.  His blood pressure has been soft and he has been receiving home doses of cardiac meds.    He received a dose of Zyprexa at midnight.  He has had intermittent short periods of apnea this morning.  I suspect he is either oversedated with Zyprexa or potentially has acid-base disturbance in the setting of aggressive diuresis.    Plan:  -With robust to positive straight leg raise will give 1 L of LR  -Cardiac meds on hold for now, defer resuming to rounding hospitalist  -Please consider hold parameters for all cardiac medications  -Lactate, VBG, procalcitonin    TOM Luna, CNP  Hospitalist Service, House Officer  [facility]     Text Page  Pager: [phone]

## 2022-03-06 NOTE — CONSULTS
Care Management Initial Consult    General Information  Assessment completed with: Spouse or significant other,  (Melissa)  Type of CM/SW Visit: Initial Assessment    Primary Care Provider verified and updated as needed: Yes   Readmission within the last 30 days: other (see comments)   Return Category: Exacerbation of disease  Reason for Consult: discharge planning  Advance Care Planning: Advance Care Planning Reviewed: no concerns identified          Communication Assessment  Patient's communication style: spoken language (English or Bilingual)    Hearing Difficulty or Deaf: yes   Wear Glasses or Blind: yes    Cognitive  Cognitive/Neuro/Behavioral: .WDL except  Level of Consciousness: confused  Arousal Level: arouses to touch/gentle shaking  Orientation: disoriented to, situation, place  Mood/Behavior: restless  Best Language: 0 - No aphasia  Speech: slow    Living Environment:   People in home: facility resident     Current living Arrangements: extended care facility      Able to return to prior arrangements: yes  Living Arrangement Comments:  ([facility])    Family/Social Support:  Care provided by: other (see comments)  Provides care for: no one, unable/limited ability to care for self  Marital Status:   Wife, Children   (melissa)       Description of Support System: Supportive, Involved         Current Resources:   Patient receiving home care services:       Community Resources:    Equipment currently used at home: walker, standard  Supplies currently used at home:      Employment/Financial:  Employment Status: retired        Financial Concerns: No concerns identified   Referral to Financial Worker: No       Lifestyle & Psychosocial Needs:  Social Determinants of Health     Tobacco Use: Unknown     Smoking Tobacco Use: Never Smoker     Smokeless Tobacco Use: Unknown   Alcohol Use: Not on file   Financial Resource Strain: Not on file   Food Insecurity: Not on file   Transportation Needs: Not on  file   Physical Activity: Not on file   Stress: Not on file   Social Connections: Not on file   Intimate Partner Violence: Not on file   Depression: Not on file   Housing Stability: Not on file       Functional Status:  Prior to admission patient needed assistance:              Mental Health Status:  Mental Health Status: No Current Concerns       Chemical Dependency Status:  Chemical Dependency Status: No Current Concerns             Values/Beliefs:  Spiritual, Cultural Beliefs, Voodoo Practices, Values that affect care:                 Additional Information:  JOÃO spoke with the pt's wife Melissa to discuss discharge planning. Melissa noted that the pt was at [facility] and she is holding his bed there. She agrees that she would like the pt to return to the facility when medical ready. JOÃO sent referral via [facility]. Melissa request transport be arranged at discharge.    ABEBA Stephens, [facility]  Phone [phone]

## 2022-03-06 NOTE — PROGRESS NOTES
RRT earlier (unresponsiveness). Please refer to the code note.  Mildly elevated procalcitonin. Nl lactate.  Fluids given.  Appears at baseline now.    Check UA.  Minimize sedating meds.  Keep holding BP meds (resume with hold parameters once BP is better then resume with holding parameters).  Clinical monitoring.

## 2022-03-06 NOTE — PROGRESS NOTES
RRT called for decreased LOC, new onset twitching. Bps very soft, LR bolus ordered. BP meds held, blood gas and lactic labs ordered. Pt's BP stabilized, BP now 110/78. O2 increased to 3L NC. Pt is more awake now, continue to monitor.

## 2022-03-06 NOTE — PROGRESS NOTES
[facility]    Medicine Progress Note - Hospitalist Service    Date of Admission:  3/3/2022    Assessment & Plan        Irvin Bridges is an 89 year-old male with history of dementia, chronic kidney disease stage 4 and aortic atherosclerosis who presents via EMS with shortness of breath. Admitted 3/3/2022.     Acute on chronic systolic congestive heart failure (HFrEF)  Acute hypoxic respiratory failure secondary to flash pulmonary edema   Hypertension   Brought in to ED on 2L nasal cannula by EMS, though per notes not clear he was ever hypoxic prior to this  Following episode of agitation and administration of sedatives, was placed on non-rebreather for reported oxygen saturations of 80%.   CXR with worsening in engorged indistinct central pulmonary vasculature with increased interstitial markings consistent with worsening interstitial pulmonary edema  Acute hypoxia likely multifactorial from sedation and flash pulmonary edema potentially caused by severe agitation versus blood transfusion  Recently admitted 2/24-3/1/22 for acute hypoxic respiratory failure due to acute systolic CHF with pulmonary edema, probable type II NSTEMI for above. Medications adjusted by cardiology including initiating hydralazine and Imdur, discharged on PO furosemide  Echocardiogram 2/25 with EF 35-50%, early diastolic dysfunction   Asymptomatic. 39-91% sats on RA.  - Continue diuresis per cards (transition to PO this am)  - Daily weights, strict I/O  - Continue prior to admission amlodipine, carvedilol, furosemide, hydralazine, Imdur (when able to take PO)  - Daily BMP while diuresing  - Follow cards recs (appreciate assistance).  - Wean off oxygen as able.     Acute on chronic macrocytic anemia  *CBC drawn at TCU initially at 6.5 g/dl, recheck here 6.8 g/dl. Reportedly had some hematuria at TCU with clot noted at King tip, that resolved with removal.   *Hemoglobin in low 7 g/dls during most recent  hospitalization. Iron studies not suggestive of deficiency, folate and B12 within normal limits.    - Transfused 1 unit in ED and 3/4 Hgb 8.4--9.1--8.6 this am  - follow CBC     Dementia with behavioral disturbance  Calm and cooperative.  - Delirum bundle.  - Prn meds.     Urinary retention  Meatus bleeding secondary to trauma   King placed 2/26 due to retention, discharged with King with plan for TOV at TCU.  - Pulled King in TCU with some bleeding from balloon trauma   - King replaced in ED and bleeding resolved  - Continue tamsulosin     Chronic kidney disease, stage 4  Baseline creatinine 2.3-2.4 with GFR in 20s. Creatinine 2.4 on admission--2.04--2.54 this am   - Hold lasix this evening (reassess in am)  - Avoid nephrotoxins  - BMP in am.     Depression  - Continue prior to admission escitalopram     Chronic low back pain  No acute change.   - Continue scheduled acetaminophen          Diet: 2 Gram Sodium Diet Other - please comment    DVT Prophylaxis: Pneumatic Compression Devices  King Catheter: PRESENT, indication: Retention  Central Lines: None  Cardiac Monitoring: ACTIVE order. Indication: Acute decompensated heart failure (48 hours)  Code Status:  Ordered DNI status as previously documented.       Disposition Plan   Expected Discharge: 1-2 days possibly (once off oxygen) also pending cr stability/       The patient's care was discussed with the Bedside Nurse and Patient and dtr./    Sherry Bajwa MD  Hospitalist Service  [facility]        ______________________________________________________________________    Interval History   Calm and cooperative. Confused. Asking if his wife is in the next room. Denies chest pain/sob.     Physical Exam   Vital Signs: Temp: 98.4  F (36.9  C) Temp src: Oral BP: 123/61 Pulse: 67   Resp: 18 SpO2: 92 % O2 Device: None (Room air) Oxygen Delivery: 1 LPM  Weight: 127 lbs 11.2 oz    Gen: NAD, elderly, confused  HEENT: Normocephalic, EOMI,  MMM  Resp: no focal crackles,  no wheezes, no increased work of resp  CV: S1S2 heard, reg rhythm, reg rate  Abdo: soft, nontender, nondistended, bowel sounds present  Ext: calves nontender, well perfused  Neuro: AAO, baseline dementia noted , CN grossly intact, no facial asymmetry      Data   Recent Labs   Lab 03/05/22  1833 03/05/22  1232 03/05/22  0601 03/04/22  1909 03/04/22  0643 03/03/22  1753   WBC  --   --  11.1*  --  10.5 9.0   HGB  --   --  9.1*  --  8.4* 6.8*   MCV  --   --  100  --  100 108*   PLT  --   --  223  --  209 205   NA  --   --  137  --  137 135   POTASSIUM 4.1 3.4 3.2*   < > 3.6 4.1   CHLORIDE  --   --  99  --  104 103   CO2  --   --  30  --  28 25   BUN  --   --  36*  --  42* 47*   CR  --   --  2.06*  --  2.15* 2.40*   ANIONGAP  --   --  8  --  5 7   ABHI  --   --  8.6  --  8.6 8.7   GLC  --   --  130*  --  113* 146*   ALBUMIN  --   --   --   --   --  2.8*   PROTTOTAL  --   --   --   --   --  6.4*   BILITOTAL  --   --   --   --   --  0.2   ALKPHOS  --   --   --   --   --  74   ALT  --   --   --   --   --  17   AST  --   --   --   --   --  18    < > = values in this interval not displayed.     No results found for this or any previous visit (from the past 24 hour(s)).

## 2022-03-07 NOTE — PROGRESS NOTES
Shift Note: 03/06/2022 9540-4576  A/O x1-2, self and month only. VSS on 1L O2 via NC. Murmur detected on auscultation. Na 2g restriction, 2L fl restriction. King for retention. 1:1 sitter for pulling at King. LUE PIV SL. Redness/Peeling at coccyx, Mepi in place, turn/repo q2h, x2. Up to chair for dinner. On K+ replacement protocol, not replaced today, recheck ordered for 0600. Discharge TBD.

## 2022-03-07 NOTE — PLAN OF CARE
Goal Outcome Evaluation:      7a-3p nurse Shift note:  Code status changed to DNR/DNI this shift. Hyponatremic Na+ 130 - thus staying on the 2000 ml oral fluid restriction. Hospitalist aware of abnormal labs  Alert, awake forgetful at times and disorientated to  situation. Bedside attendant in use to prevent pt from pulling in his O2 and toussaint which has caused some minor bleeding (around toussaint) at times  VSS on 2L NC fine crackles in bilat lower lobes. MD mentioned  IS in their notes but none yet ordered - writer obtained order for it this am. Cooperates with cares and taking meds. Swallows meds whole with water/juice. Tolerating Low Na diet,    Red/blanchable coccyx, mepliex in place. Tele is NSR with BBB. OT following - writer asked for and obtained PT consult since pt remains weak, unsteady and needed pivot transfer with assist of 1 to chair and 2 NA to assist getting pt back into bed.    Anticipate discharge back to [facility] when medically stable

## 2022-03-07 NOTE — CONSULTS
[facility]CORE [facility]    Received a CORE Clinic Consult from Dr. King.     Mr. Bridges has a previously arranged CORE enrollment visit next week.     Please call with questions.    Future Appointments   Date Time Provider Department Center   3/9/2022 11:45 AM Carrie Barrow, OT SHOT [facility]   3/14/2022  9:30 AM Aditi Lam, APRN CNP [facility] PSA CLIN         Alba Bran RN BSN  CORE Clinic Care Coordinator [place]  [phone]  3/7/2022  9:08 AM

## 2022-03-07 NOTE — PLAN OF CARE
Goal Outcome Evaluation:                    Shift note:  A&O x2, disorientated to place and situation. VSS on 1L NC fine crackles in bilat lower lobes. Ax2 for repo Q2H. Red/blanchable coccyx, mepliex in place.Sitter in room, pt likes to tug at O2 and toussaint. Toussaint draining well, meatus is slight red with small amount of bleeding. Low Na diet, 2000 ml restriction. Maintains fluid restriction with adequate urin output. Tele is BBB. OT/PT ordered. Discharge [facility] when stable per SW and wife.

## 2022-03-07 NOTE — PROGRESS NOTES
Care Management Follow Up    Length of Stay (days): 3    Expected Discharge Date: 03/09/2022     Concerns to be Addressed: all concerns addressed in this encounter     Patient plan of care discussed at interdisciplinary rounds: Yes    Anticipated Discharge Disposition: Skilled Nursing Facilty     Anticipated Discharge Services: None  Anticipated Discharge DME: None    Patient/family educated on Medicare website which has current facility and service quality ratings: no  Education Provided on the Discharge Plan:    Patient/Family in Agreement with the Plan: yes    Referrals Placed by CM/SW: Post Acute Facilities  Private pay costs discussed: Not applicable    Additional Information:  Call received from Daiana at [facility] wondering when patient will be ready for discharge. Daiana explained that patient will need to be off bedside sitter for 24 hours. Daiana also alerted writer that patient will need a new BCBS Medicare Advantage prior auth.       ABEBA Delgado, LGSW   Social Work   [facility]

## 2022-03-08 NOTE — PLAN OF CARE
Oriented to self and time, disoriented to place and situation. VSS ex soft BP, weaned to RA this afternoon. LS with fine crackles in bases. Tele SR with PVCs. Denied pain. PIV SL. King with dried blood, repositioned stat lock to see if bleeding stopped. King with adequate UOP. On fluid restriction and low sodium diet- good appetite. Creat 2.49. IS taught but pt could not follow directions- will continue to teach. Up A1-2, gb, walker. Sitter at bedside. Plan for patient to discharge back to [facility] once medically stable and doesn't require a sitter.

## 2022-03-08 NOTE — PROGRESS NOTES
[facility]    Medicine Progress Note - Hospitalist Service    Date of Admission:  3/3/2022    Assessment & Plan        Irvin Bridges is an 89 year-old male with history of dementia, chronic kidney disease stage 4 and aortic atherosclerosis who presents via EMS with shortness of breath. Admitted 3/3/2022.     Acute on chronic systolic congestive heart failure (HFrEF)  Acute hypoxic respiratory failure secondary to flash pulmonary edema   Hypertension   Brought in to ED on 2L nasal cannula by EMS, though per notes not clear he was ever hypoxic prior to this  Following episode of agitation and administration of sedatives, was placed on non-rebreather for reported oxygen saturations of 80%.   CXR with worsening in engorged indistinct central pulmonary vasculature with increased interstitial markings consistent with worsening interstitial pulmonary edema  Acute hypoxia likely multifactorial from sedation and flash pulmonary edema potentially caused by severe agitation versus blood transfusion  Recently admitted 2/24-3/1/22 for acute hypoxic respiratory failure due to acute systolic CHF with pulmonary edema, probable type II NSTEMI for above. Medications adjusted by cardiology including initiating hydralazine and Imdur, discharged on PO furosemide  Echocardiogram 2/25 with EF 35-40%, early diastolic dysfunction   Asymptomatic. Sats lower to mid 90s on RA.  - PTA regimen amlodipine, carvedilol, furosemide, hydralazine, Imdur   - initially given IV lasix then PO on 3/6 AM, then stopped.  - Daily weights, strict I/O - 3/ net neg 7.2L, wts variable  - since 3/7 - holding PTA cardiac meds and diuresis - had RRT 3/6 and received LR bolus.   --- procal rechecked and decreased at 0.2 from 0.5, less useful in renal failure.  Remains nontoxic. BP and HR WNL   - 3/8 VSS WNL despite amlodipine, carvedilol, lasix and hydralazine being held - monitor wt, O2 need, VSS  - see below, CPAP/BIPAP ok if necessary  and if tolerated  - Wean off oxygen as able. OOB/IS as tolerated.      Increased somnolence, apneic spells AM 3/8  Patient did receive olanzapine after 2 AM today-unclear if this is playing a role given its been several hours since then and he is still fairly somnolent, however he does have significant CKD.  He was arousable to noxious stimuli and reported no new pain but ongoing lower back pain.  No focal neurological deficit appreciated.  He is nontoxic in appearance and afebrile.  Vital signs have remained stable.  LFTs, ammonia unremarkable.  - ABG stat 7.51, pCO2 38, pO2 90, bicarb 30  - appears to have alkalosis at least in part metabolic -I suspect this may be due to his decreased intake and chronic kidney disease.  Discussed with wife at bedside at length and explained that no clear etiology is otherwise found.  He does have significant baseline dementia and advanced age which are undoubtedly contributing.  We also discussed consideration of echocardiogram as well as head CT but at this point and with the lack of focal neurological deficit we agreed that we will hold off.  Will give 1 L normal saline with potassium over 4 hours and monitor.  Patient spouse is okay with a trial of CPAP if O2 saturations do not maintain due to apneic spells.  - Also discussed with patient spouse that given his comorbidities and age, his body may give out at any time.  Currently his blood pressure is stable.  She is clear that overall comfort is the main goal and aggressive testing is not in their wishes.  Please contact spouse if any deterioration overnight.    Dementia with behavioral disturbance  Calm and cooperative. Has been needing a sitter, however. Needs to be ok without sitter prior to [facility] accepting him.  - Delirum bundle.  - Prn meds.     Acute on chronic macrocytic anemia  *CBC drawn at TCU initially at 6.5 g/dl, recheck here 6.8 g/dl. Reportedly had some hematuria at TCU with clot noted at King tip,  that resolved with removal.   *Hemoglobin in low 7 g/dls during most recent hospitalization. Iron studies not suggestive of deficiency, folate and B12 within normal limits.    - Transfused 1 unit in ED and 3/4 Hgb 8.4--9.1--8.6 --> 8.2    Urinary retention  Meatus bleeding secondary to trauma   King placed 2/26 due to retention, discharged with King with plan for TOV at TCU.  - Pulled King in TCU with some bleeding from balloon trauma   - King replaced in ED and bleeding resolved  - Continue tamsulosin     Chronic kidney disease, stage 4  Mild hyponatremia  Baseline creatinine 2.3-2.4 with GFR in 20s. Creatinine 2.4 on admission--2.04--2.54  - Lasix held evening 3/6 - creat 2.49 on 3/7, fairly stable. Sodium 130 3/7  - 3/8 Na 132, creat 2.2 (improved/stable)  - follow BMP     Depression  - Continue prior to admission escitalopram     Chronic low back pain  No acute change.   - Continue scheduled acetaminophen         Diet: 2 Gram Sodium Diet Other - please comment    DVT Prophylaxis: Pneumatic Compression Devices  King Catheter: PRESENT, indication: Retention  Central Lines: None  Cardiac Monitoring: None  Code Status: No CPR- Do NOT Intubate      Disposition Plan   Expected Discharge: 03/10/2022     Anticipated discharge location: nursing home           The patient's care was discussed with the Bedside Nurse, Patient and Patient's Family.    Ryan Garibay MD  Hospitalist Service  [facility]  Securely message with the Vocera Web Console (learn more here)  Text page via [employer] Paging/Directory     Total encounter time 38 minutes with greater than 50% spent in direct patient care, discussion and counseling with patient, spouse, coordination of care with RN and staff.    Clinically Significant Risk Factors Present on Admission                    ______________________________________________________________________    Interval History   Patient seen and examined this morning after RN  found him to be having apneic spells and be less responsive.  He was eventually arouse to noxious stimuli and reported continued chronic back pain.  He is moving all extremities at will.  He is afebrile and nontoxic in appearance.    Patient visited again this afternoon while spouse at bedside and discussed as above.  Of note apneic spells have decreased this afternoon.  Additionally patient is a little bit easier to awaken.    Data reviewed today: I reviewed all medications, new labs and imaging results over the last 24 hours. I personally reviewed no images or EKG's today.    Physical Exam   Vital Signs: Temp: 98.6  F (37  C) Temp src: Oral BP: 105/67 Pulse: 66   Resp: 16 SpO2: 97 % O2 Device: Nasal cannula Oxygen Delivery: 1 LPM  Weight: 134 lbs 8 oz    Gen: somnolent, arouses to noxious stimuli and able to answer a few questions  HEENT: Normocephalic, EOMI, MMM  Resp: no focal crackles,  no wheezes, no increased work of resp, apneic spells this morning, improved later in the afternoon  CV: S1S2 heard, reg rhythm, reg rate  Abdo: soft, nontender, nondistended, bowel sounds present  Ext: calves nontender, well perfused  Neuro: Dementia at baseline, as above somnolent, moving all 4 extremities at will       Data   Recent Labs   Lab 03/08/22  0631 03/07/22  0751 03/06/22  0749 03/05/22  1232 03/05/22  0601 03/04/22  0643 03/03/22  1753   WBC 9.7  --  11.9*  --  11.1*   < > 9.0   HGB 8.2*  --  8.6*  --  9.1*   < > 6.8*     --  101*  --  100   < > 108*     --  208  --  223   < > 205   * 130* 133  --  137   < > 135   POTASSIUM 3.7 3.7 3.5   < > 3.2*   < > 4.1   CHLORIDE 98 97 96  --  99   < > 103   CO2 30 29 29  --  30   < > 25   BUN 50* 53* 47*  --  36*   < > 47*   CR 2.22* 2.49* 2.54*  --  2.06*   < > 2.40*   ANIONGAP 4 4 8  --  8   < > 7   ABHI 8.6 8.7 8.9  --  8.6   < > 8.7   * 118* 133*  --  130*   < > 146*   ALBUMIN 2.5*  --   --   --   --   --  2.8*   PROTTOTAL 6.1*  --   --   --   --    --  6.4*   BILITOTAL 0.3  --   --   --   --   --  0.2   ALKPHOS 63  --   --   --   --   --  74   ALT 15  --   --   --   --   --  17   AST 17  --   --   --   --   --  18    < > = values in this interval not displayed.     No results found for this or any previous visit (from the past 24 hour(s)).

## 2022-03-08 NOTE — PLAN OF CARE
"Goal Outcome Evaluation:      7a-3p RN shift note  Medical Status change this am. Writer  placed call to Dr. Garibay at 1145 regarding: Pt having cyclical, long sleep apnea periods of 30-50 minutes followed by restlessness/agitation,  and 9 quick  snoring breaths, incoherent mumbling, then back to a long sleep apnea period again. Unable to remove bedside attendant due to his increased restlessness and pulling on toussaint catheter causing meatal bleeding during the few minutes he is breathing. Writer elevated HOB, increased O2 to 2LPM (since O2 sats dropped to high 70-s to low 80's on 1LPM during apneic phase) and asked for advisemement. Dr Garibay called within minutes later stating he'll order ABG's,  assess pt at bedside shortly and update family.   By 1350 no ABG results back yet.  Writer call lab abd RT wondering when results will be ready. I was told by Resp therapist that the ABG order that RT dept needs to get to draw blood for ABG's is \"[id number]\" (which is blood gas arterial with oxyhemoglobin). Writer told RT to draw the ABG stat, notified Hospitalist of above and modified/corrected the order so RT see's it and obtains it more readily. Dr Garibay he updated pt's spouse Melissa on this status change. Melissa arrived to pt's bedside this afternoon to wait for the hospitalist to explain the newly obtained labs and medical plan going forward.   Alert only to self for brief moments. Toussaint adequate UOP, Remains on 1200cc fluid restriction and low sodium diet. Hgb worse at 8.2 today. NA+ improving. K+ was 3.7 - so no replacement was needed. Pt too lethargic for safe swallow of am meds thus the majority of them were not given. Also too lethargic to be out of bed .Stayed on bedrest this shift with repo's every 2 hours.   Continue to monitor                    "

## 2022-03-08 NOTE — PLAN OF CARE
Pt alert and restless, oriented to self only.  VSS on RA except dipped into 70s while sleeping this AM so [employer] put pt on 1L O2 via NC. Lungs with fine crackles in bases. Tele SR with BBB and occasional PVCs. C/o pain at 0300, tylenol helped. Restless all night, pulling at lines.  Zyprexa given with limited effect. Had sitter from 7-11, had [facility] from 11 on, [facility]arm ended up spending most of night in his room as he pulled on his toussaint and caused some blood.  Na low 130 with redraw on AM, MD aware. Creat 2.59.  Toussaint adequate UOP, 1200 mL from 7pm until 6 am. Fluid restriction and low sodium diet.  240mL in on my shift. Pt unable to follow directions for IS. Up A1-2, gb, walker. Plan for discharge back to [facility] once medically stable and sitter-free for 24hrs.

## 2022-03-08 NOTE — PROVIDER NOTIFICATION
"1330 MD notification update from Nursing    Writer concerned that stat ABG's from hospitaist's order have not yet been drawn. I spoke with lab who stated the MD's order went to lab and not RT and that the  ABG needs to be drawn by RT. I spoke with RT told them to draw ABG now stat and he said he will but I need to discontinue the MD's original ABG order from doctor and enter new one that RT recognizes. Dr Garibay notified of above  Pt continues to have long apneic periods in pattern as detailed in my previous MD notification note. Continue to monitor    1400 Addendum: Writer was told by Resp therapist that the ABG order that RT dept needs to get to draw blood for ABG's is \"[id number]\" (which is blood gas arterial with oxyhemoglobin). Writer notified Hospitalist of above  "

## 2022-03-08 NOTE — PROVIDER NOTIFICATION
MD Notification from Nursing:    Call placed  Dr. Garibay at 1145 regarding:  Medical change in status. Pt having cyclical, long sleep apnea periods of 30-50 minutes followed by restlessness/agitation,  and 9 quick  snoring breaths, incoherent mumbling, then back to sleep apnea again. Unable to remove bedside attendant due to his increased restlessness and pulling on toussaint catheter causing meatal bleeding during the few minutes he is breathing. Writer elevated HOB, increased O2 to 2LPM (since O2 sats dropped to high 70-s to low 80's on 1LPM during apneic phase) and asked for advisemement. Dr Garibay called within minutes later stating he'll order ABG's,  assess pt at bedside shortly and update family.   Continue to monitor    ...Natasha Avila RN OCN

## 2022-03-09 NOTE — PROGRESS NOTES
Pt A/Ox1, lethargic and restless in the am but more awake this afternoon. PRN ativan given 1x. Bipap ordered for night, 2L NC during day. O2 sats are 95 and above. Chest xray ordered. King in place, adequate output. Urine is red tinged. Low Na diet, 2000 ml restriction.

## 2022-03-09 NOTE — PLAN OF CARE
MD Notification    Notified Person: MD    Notified Person Name: Dr. Brandon    Notification Date/Time: 3/8/2022 2142    Notification Interaction: page    Purpose of Notification: 8829- R.J. Pt having apneic periods for 50-55 seconds. Appears frightened when awakening.  Please advise. Thanks Batsheva     Orders Received:    Comments:

## 2022-03-09 NOTE — PLAN OF CARE
MD Notification    Notified Person: MD    Notified Person Name: Bradley    Notification Date/Time: 2250    Notification Interaction: page    Purpose of Notification: 3129- R.J. CPAP isn't effective per RT, pt now on Bipap. Please switch order to Bipap.   Can we have a PRN IV order for Ativan or Zyprexa for anxiety.   FYI: urine is now pink, had a few clots. Last hemo 8.2 am Thanks Batsheva     Orders Received: Order switched to Bipap. 0.5 mg IV Ativan PRN q4.     Comments:  CBC recheck in am already ordered

## 2022-03-09 NOTE — PLAN OF CARE
Shift note: [id number]  Garbled speech, was alert to self and able to state birthday before placing on bipap.  Disoriented to time, place and situation. Not OOB this shift. Was able to take Tylenol crushed with applesauce beginning of shift. On Bipap, with 50% FiO2. PRN IV Ativan given x2 for restlessness. PIV SL. BP and HR stable, afebrile. King with pink tinged urine, MD made aware, CBC recheck this am, last hemo 8.2.  Repeat ABG's ordered for this am.

## 2022-03-09 NOTE — PROVIDER NOTIFICATION
Paged regarding patient's apneic spells. Per notes, it appears next step agreed upon by day team and patient's wife is trial of CPAP. Order placed. RN to call RT.    Joselito Huerta MD, MPH  Internal Medicine

## 2022-03-09 NOTE — PLAN OF CARE
Pt oriented to self only but very lethargic this shift. VSS on 2L O2 via NC except periods of 30-50 second apnea during sleep. MD saw pt, said if pt declines further, call RT for CPAP and then page doctor. Pt restless when awake, pulling at lines. PIV infusing 250mL/hr NS with 20mEQ K+ this is a one time order, as a prolonged bolus. King in place, adequate UOP, remains on 2L fluid restriction and low sodium diet but unable to safely eat or drink as pt is too lethargic to even take meds. Too lethargic to take out of bed. Turn and repo q2. Continue to monitor.

## 2022-03-09 NOTE — PROGRESS NOTES
[facility]    Medicine Progress Note - Hospitalist Service    Date of Admission:  3/3/2022    Assessment & Plan        Irvin Bridges is an 89 year-old male with history of dementia, chronic kidney disease stage 4 and aortic atherosclerosis who presents via EMS with shortness of breath. Admitted 3/3/2022.     Acute on chronic systolic congestive heart failure (HFrEF)  Acute hypoxic respiratory failure secondary to flash pulmonary edema   Hypertension   Brought in to ED on 2L nasal cannula by EMS, though per notes not clear he was ever hypoxic prior to this  Following episode of agitation and administration of sedatives, was placed on non-rebreather for reported oxygen saturations of 80%.   CXR with worsening in engorged indistinct central pulmonary vasculature with increased interstitial markings consistent with worsening interstitial pulmonary edema  Acute hypoxia likely multifactorial from sedation and flash pulmonary edema potentially caused by severe agitation versus blood transfusion  Recently admitted 2/24-3/1/22 for acute hypoxic respiratory failure due to acute systolic CHF with pulmonary edema, probable type II NSTEMI for above. Medications adjusted by cardiology including initiating hydralazine and Imdur, discharged on PO furosemide  Echocardiogram 2/25 with EF 35-40%, early diastolic dysfunction   Asymptomatic. Sats lower to mid 90s on RA.  - PTA regimen amlodipine, carvedilol, furosemide, hydralazine, Imdur   - initially given IV lasix then PO on 3/6 AM, then stopped.  - Daily weights, strict I/O - 3/ net neg 7.2L, wts variable  - since 3/7 - holding PTA cardiac meds and diuresis - had RRT 3/6 and received LR bolus.   --- procal rechecked and decreased at 0.2 from 0.5, less useful in renal failure.  Remains nontoxic. BP and HR WNL   - 3/8 VSS WNL despite amlodipine, carvedilol, lasix and hydralazine being held - monitor wt, O2 need, VSS  - see below, CPAP/BIPAP ok if necessary  and if tolerated  - 3/9 on bipap overnight, now satting well on 2L nc. CXR with some improvement in congestion. BNP 24k, not as high as previously when adm with HF. Consider resuming PO lasix tmrw.  - Wean off oxygen as able. OOB/IS as tolerated.      Increased somnolence, apneic spells AM 3/8  Patient did receive olanzapine after 2 AM today-unclear if this is playing a role given its been several hours since then and he is still fairly somnolent, however he does have significant CKD.  He was arousable to noxious stimuli and reported no new pain but ongoing lower back pain.  No focal neurological deficit appreciated.  He is nontoxic in appearance and afebrile.  Vital signs have remained stable.  LFTs, ammonia unremarkable.  - ABG stat 7.51, pCO2 38, pO2 90, bicarb 30  - appears to have alkalosis at least in part metabolic -I suspect this may be due to his decreased intake and chronic kidney disease.  Discussed with wife at bedside at length and explained that no clear etiology is otherwise found.  He does have significant baseline dementia and advanced age which are undoubtedly contributing.  We also discussed consideration of echocardiogram as well as head CT but at this point and with the lack of focal neurological deficit we agreed that we will hold off.  Will give 1 L normal saline with potassium over 4 hours and monitor.  Patient spouse is okay with a trial of CPAP if O2 saturations do not maintain due to apneic spells.  - Also discussed with patient spouse that given his comorbidities and age, his body may give out at any time.  Currently his blood pressure is stable.  She is clear that overall comfort is the main goal and aggressive testing is not in their wishes.  Please contact spouse if any deterioration overnight.  - 3/9 -overall improved today-still having episodes of agitation and somnolence but was awakened by voice and followed commands, denied new issues     Dementia with behavioral disturbance  Calm  and cooperative. Has been needing a sitter, however. Needs to be ok without sitter prior to [facility] accepting him.  - Delirum bundle.  - Prn meds.     Acute on chronic macrocytic anemia  *CBC drawn at TCU initially at 6.5 g/dl, recheck here 6.8 g/dl. Reportedly had some hematuria at TCU with clot noted at King tip, that resolved with removal.   *Hemoglobin in low 7 g/dls during most recent hospitalization. Iron studies not suggestive of deficiency, folate and B12 within normal limits.    - Transfused 1 unit in ED and 3/4 Hgb 8.4--9.1--8.6 --> 8.2     Urinary retention  Meatus bleeding secondary to trauma   King placed 2/26 due to retention, discharged with King with plan for TOV at TCU.  - Pulled King in TCU with some bleeding from balloon trauma   - King replaced in ED and bleeding resolved  - Continue tamsulosin     Chronic kidney disease, stage 4  Mild hyponatremia  Baseline creatinine 2.3-2.4 with GFR in 20s. Creatinine 2.4 on admission--2.04--2.54  - Lasix held evening 3/6 - creat 2.49 on 3/7, fairly stable. Sodium 130 3/7  - 3/8 Na 132, creat 2.2 (improved/stable)  - 3/9 Na 138, creat 1.9  - follow BMP     Depression  - Continue prior to admission escitalopram     Chronic low back pain  No acute change.   - Continue scheduled acetaminophen     Moderate malnutrition in setting of acute and chronic illness  % Weight Loss:  Up to 5% in 1 month (moderate malnutrition)  % Intake:  <75% for > 7 days (moderate malnutrition)  Subcutaneous Fat Loss:  Orbital region mild depletion and Upper arm region mild depletion  Muscle Loss:  Temporal region mild depletion and Clavicle bone region mild depletion  2g Na diet  - Appreciate Nutrition assistance  - Pt reviewed protein sources with dietitian  - Pt dislikes protein supplements per spouse (does drink milkshakes)   - Thera vit M daily         Diet: 2 Gram Sodium Diet Other - please comment    DVT Prophylaxis: Pneumatic Compression Devices  King Catheter:  PRESENT, indication: Retention  Central Lines: None  Cardiac Monitoring: None  Code Status: No CPR- Do NOT Intubate      Disposition Plan   Expected Discharge: 03/11/2022     Anticipated discharge location: nursing home         The patient's care was discussed with the Bedside Nurse, Patient and Patient's Family.    Ryan Garibay MD  Hospitalist Service  [facility]  Securely message with the [employer]oc[employer] Web Console (learn more here)  Text page via [employer] Paging/Directory         Clinically Significant Risk Factors Present on Admission                    ______________________________________________________________________    Interval History   Seen and examined this morning.  Patient is on BiPAP but is more responsive and following simple commands.  He denied new issues and actually denied shortness of breath.  Staff reports still intermittent anxiety alternating with somnolence.  In the afternoon he has been stable on 2 L nasal cannula and chest x-ray shows improvement in congestion.    Data reviewed today: I reviewed all medications, new labs and imaging results over the last 24 hours. I personally reviewed no images or EKG's today.    Physical Exam   Vital Signs: Temp: 98.6  F (37  C) Temp src: Axillary BP: (!) 142/59 Pulse: 62   Resp: 17 SpO2: 100 % O2 Device: BiPAP/CPAP Oxygen Delivery: 5 LPM  Weight: 134 lbs 8 oz      Gen: NAD, elderly, somnolent but awakened to voice  HEENT: Normocephalic, EOMI, MMM  Resp: no focal crackles, no wheezes, no increased work of resp  CV: S1S2 heard, reg rhythm, reg rate, no pedal edema  Abdo: soft, nontender, nondistended, bowel sounds present  Ext: calves nontender, well perfused  Neuro: awakens to voice, followed simple commands, moving all ext      Data   Recent Labs   Lab 03/09/22  0725 03/08/22  0631 03/07/22  0751 03/06/22  0749 03/04/22  0643 03/03/22  1753   WBC 8.5 9.7  --  11.9*   < > 9.0   HGB 8.2* 8.2*  --  8.6*   < > 6.8*   * 100  --   101*   < > 108*    202  --  208   < > 205    132* 130* 133   < > 135   POTASSIUM 4.2 3.7 3.7 3.5   < > 4.1   CHLORIDE 105 98 97 96   < > 103   CO2 27 30 29 29   < > 25   BUN 43* 50* 53* 47*   < > 47*   CR 1.90* 2.22* 2.49* 2.54*   < > 2.40*   ANIONGAP 6 4 4 8   < > 7   ABHI 9.0 8.6 8.7 8.9   < > 8.7   GLC 97 114* 118* 133*   < > 146*   ALBUMIN  --  2.5*  --   --   --  2.8*   PROTTOTAL  --  6.1*  --   --   --  6.4*   BILITOTAL  --  0.3  --   --   --  0.2   ALKPHOS  --  63  --   --   --  74   ALT  --  15  --   --   --  17   AST  --  17  --   --   --  18    < > = values in this interval not displayed.     Recent Results (from the past 24 hour(s))   XR Chest Port 1 View    Narrative    CHEST ONE VIEW PORTABLE  3/9/2022 2:20 PM       INDICATION: Shortness of breath. Hypoxia.    COMPARISON: 3/4/2022       Impression    IMPRESSION: Improved pulmonary vascular congestion. Small right  pleural effusion. Minimal infiltrate or atelectasis in the lung bases,  similar to previous.

## 2022-03-10 NOTE — PROGRESS NOTES
[facility]    Medicine Progress Note - Hospitalist Service    Date of Admission:  3/3/2022    Assessment & Plan        Irvin Bridges is an 89 year-old male with history of dementia, chronic kidney disease stage 4 and aortic atherosclerosis who presents via EMS with shortness of breath. Admitted 3/3/2022.     Acute on chronic systolic congestive heart failure (HFrEF)  Acute hypoxic respiratory failure secondary to flash pulmonary edema   Hypertension   Brought in to ED on 2L nasal cannula by EMS, though per notes not clear he was ever hypoxic prior to this  Following episode of agitation and administration of sedatives, was placed on non-rebreather for reported oxygen saturations of 80%.   CXR with worsening in engorged indistinct central pulmonary vasculature with increased interstitial markings consistent with worsening interstitial pulmonary edema  Acute hypoxia likely multifactorial from sedation and flash pulmonary edema potentially caused by severe agitation versus blood transfusion  Recently admitted 2/24-3/1/22 for acute hypoxic respiratory failure due to acute systolic CHF with pulmonary edema, probable type II NSTEMI for above. Medications adjusted by cardiology including initiating hydralazine and Imdur, discharged on PO furosemide  Echocardiogram 2/25 with EF 35-40%, early diastolic dysfunction   - PTA regimen amlodipine, carvedilol, furosemide, hydralazine, Imdur   - initially given IV lasix then PO on 3/6 AM, then stopped; resumed PO 3/10  - Daily weights, strict I/O - 3/10 net neg 8.7L, wts variable  --- procal rechecked and decreased at 0.2 from 0.5, less useful in renal failure.  Remains nontoxic. BP and HR WNL   - 3/8 VSS WNL despite amlodipine, carvedilol, lasix and hydralazine being held - monitor wt, O2 need, VSS  - see below, CPAP/BIPAP ok if necessary and if tolerated  - 3/9 on bipap overnight, now satting well on 2L nc. CXR with some improvement in congestion. BNP  24k, not as high as previously when adm with HF.   - 3/10 - on oxymask at times with high 90s sat.  Resume PTA PO lasix  - Wean off oxygen as able. OOB/IS as tolerated.      Increased somnolence, apneic spells AM 3/8 - improved/resolving  Patient did receive olanzapine after 2 AM today-unclear if this is playing a role given its been several hours since then and he is still fairly somnolent, however he does have significant CKD.  He was arousable to noxious stimuli and reported no new pain but ongoing lower back pain.  No focal neurological deficit appreciated.  He is nontoxic in appearance and afebrile.  Vital signs have remained stable.  LFTs, ammonia unremarkable.  - ABG stat 7.51, pCO2 38, pO2 90, bicarb 30  - appears to have alkalosis at least in part metabolic -I suspect this may be due to his decreased intake and chronic kidney disease.  Discussed with wife at bedside at length and explained that no clear etiology is otherwise found.  He does have significant baseline dementia and advanced age which are undoubtedly contributing.  We also discussed consideration of echocardiogram as well as head CT but at this point and with the lack of focal neurological deficit we agreed that we will hold off.  Will give 1 L normal saline with potassium over 4 hours and monitor.  Patient spouse is okay with a trial of CPAP if O2 saturations do not maintain due to apneic spells.  - Also discussed with patient spouse that given his comorbidities and age, his body may give out at any time.  Currently his blood pressure is stable.  She is clear that overall comfort is the main goal and aggressive testing is not in their wishes.  Please contact spouse if any deterioration overnight.  - 3/9 -overall improved today-still having episodes of agitation and somnolence but was awakened by voice and followed commands, denied new issues  - 3/10 improved, awakens to voice, calm, no complaints - sitter at bedside     Dementia with  behavioral disturbance  Calm and cooperative. Has been needing a sitter, however. Needs to be ok without sitter prior to [facility] accepting him.  - Delirum bundle.  - Prn meds.     Acute on chronic macrocytic anemia  *CBC drawn at TCU initially at 6.5 g/dl, recheck here 6.8 g/dl. Reportedly had some hematuria at TCU with clot noted at King tip, that resolved with removal.   *Hemoglobin in low 7 g/dls during most recent hospitalization. Iron studies not suggestive of deficiency, folate and B12 within normal limits.    - Transfused 1 unit in ED and 3/4 Hgb 8.4--9.1--8.6 --> 8.2 --> 7.1     Urinary retention  Meatus bleeding secondary to trauma   King placed 2/26 due to retention, discharged with King with plan for TOV at TCU.  - Pulled King in TCU with some bleeding from balloon trauma   - King replaced in ED and bleeding resolved  - Continue tamsulosin     Chronic kidney disease, stage 4  Mild hyponatremia  Baseline creatinine 2.3-2.4 with GFR in 20s. Creatinine 2.4 on admission--2.04--2.54  - Lasix held evening 3/6 - creat 2.49 on 3/7, fairly stable. Sodium 130 3/7  - 3/8 Na 132, creat 2.2 (improved/stable)  - 3/9 Na 138, creat 1.9 -- about 2 on 3/10  - PTA lasix resumed today 3/10  - follow BMP     Depression  - Continue prior to admission escitalopram     Chronic low back pain  No acute change.   - Continue scheduled acetaminophen      Moderate malnutrition in setting of acute and chronic illness  % Weight Loss:  Up to 5% in 1 month (moderate malnutrition)  % Intake:  <75% for > 7 days (moderate malnutrition)  Subcutaneous Fat Loss:  Orbital region mild depletion and Upper arm region mild depletion  Muscle Loss:  Temporal region mild depletion and Clavicle bone region mild depletion  2g Na diet  - Appreciate Nutrition assistance  - Pt reviewed protein sources with dietitian  - Pt dislikes protein supplements per spouse (does drink milkshakes)   - Thera vit M daily         Diet: 2 Gram Sodium Diet  Other - please comment    DVT Prophylaxis: Pneumatic Compression Devices  King Catheter: PRESENT, indication: Retention  Central Lines: None  Cardiac Monitoring: None  Code Status: No CPR- Do NOT Intubate      Disposition Plan   Expected Discharge: 03/12/2022     Anticipated discharge location: nursing home    Delays:     1:1 Sitter still ordered - MD to assess  Oxygen Needs - Arrange Home O2            The patient's care was discussed with the Bedside Nurse, Patient and will call spouse or update in person this afternoon.      Ryan Garibay MD  Hospitalist Service  [facility]  Securely message with the Vocera Web Console (learn more here)  Text page via [email or web address] Paging/Directory       Total encounter time 38 min with >50% spent in direct patient care, discussion and counseling, and coordination of care with staff.      Clinically Significant Risk Factors Present on Admission                    ______________________________________________________________________    Interval History   Seen and examined. Doing much better today. Awakened to voice - denies pain, SOB, or fevers. Calm and cooperative.     Data reviewed today: I reviewed all medications, new labs and imaging results over the last 24 hours. I personally reviewed no images or EKG's today.    Physical Exam   Vital Signs: Temp: 97.5  F (36.4  C) Temp src: Oral BP: 119/60 Pulse: 58   Resp: 16 SpO2: 97 % O2 Device: Oxymask Oxygen Delivery: 3 LPM  Weight: 131 lbs 8 oz    Gen: NAD, pleasant, elderly  HEENT: Normocephalic, EOMI, MMM  Resp: no focal crackles,  no wheezes, no increased work of resp  CV: S1S2 heard, reg rhythm, reg rate, no pedal edema  Abdo: soft, nontender, nondistended, bowel sounds present  Ext: calves nontender, well perfused  Neuro: AAOxself, dementia at baseline, CN grossly intact, no facial asymmetry      Data   Recent Labs   Lab 03/10/22  0705 03/09/22  0725 03/08/22  0631 03/04/22  0643 03/03/22  1753   WBC 5.7  8.5 9.7   < > 9.0   HGB 7.1* 8.2* 8.2*   < > 6.8*   * 102* 100   < > 108*    207 202   < > 205    138 132*   < > 135   POTASSIUM 3.9 4.2 3.7   < > 4.1   CHLORIDE 104 105 98   < > 103   CO2 28 27 30   < > 25   BUN 40* 43* 50*   < > 47*   CR 1.98* 1.90* 2.22*   < > 2.40*   ANIONGAP 5 6 4   < > 7   ABHI 8.5 9.0 8.6   < > 8.7   GLC 94 97 114*   < > 146*   ALBUMIN  --   --  2.5*  --  2.8*   PROTTOTAL  --   --  6.1*  --  6.4*   BILITOTAL  --   --  0.3  --  0.2   ALKPHOS  --   --  63  --  74   ALT  --   --  15  --  17   AST  --   --  17  --  18    < > = values in this interval not displayed.     Recent Results (from the past 24 hour(s))   XR Chest Port 1 View    Narrative    CHEST ONE VIEW PORTABLE  3/9/2022 2:20 PM       INDICATION: Shortness of breath. Hypoxia.    COMPARISON: 3/4/2022       Impression    IMPRESSION: Improved pulmonary vascular congestion. Small right  pleural effusion. Minimal infiltrate or atelectasis in the lung bases,  similar to previous.    TIM ORTIZ MD         SYSTEM ID:  [id number]

## 2022-03-10 NOTE — PLAN OF CARE
Goal Outcome Evaluation:        3/9/22 [id number]  Pt alert to self. VSS on 2L via NC. L PIV SL. Sitter at bedside. Lidocaine patch removed. King patent, C/O low abd pain around 2300, noted distention around pubic area, on call hospitalist paged. Tylenol and B&O suppository given for pain w/ relief. Strict I/O, 2000 mL fluid restriction, 2gm NA diet. Up A2 w/ W + GB. Plan to restart lasix today. Discharge plan pending, likely to TCU.

## 2022-03-10 NOTE — PROVIDER NOTIFICATION
MD Notification    Notified Person: MD    Notified Person Name: Dr. MURRAY Huertas    Notification Date/Time: 3/9/22 at 2340    Notification Interaction: phone    Purpose of Notification: pt c/o lower abd pain, possible bladder spasm?    Orders Received: B&O suppository    Comments:

## 2022-03-10 NOTE — PLAN OF CARE
Pt had a pretty good evening. Vitals stable on 2L O2, sats okay on room air while pt awake and alert. Mostly calm and cooperative, can get anxious at times and intermittently drowsy at times but easily arousable to voice. Disorientated to time and situation, sometimes place. Episode of severe pain in penis from toussaint- gave tylenol and applied urojet, improved. Lido patch to low back. Ate fair for supper. Got up to chair for a bit and sat on commode w/ 2 assist walker/gb, soft large BM this shift. Toussaint patent draining blood tinged urine, some bleeding at meatus. Bipap put on at bedtime per orders. Plan to resume PO Lasix tomorrow. Discharge plan pending, will likely need TCU.    Addendum- pt alert and not tolerating bipap at end of shift. Took bipap off and placed on 2L O2. Will monitor closely for signs of apneic breathing.

## 2022-03-10 NOTE — PROGRESS NOTES
"CLINICAL NUTRITION SERVICES - REASSESSMENT NOTE      RECOMMENDATIONS FOR MD/PROVIDER TO ORDER:   Recommend diet liberalization as able given reduced intakes and stated goal of overall comfort vs aggressive testing.   Malnutrition: (3/5)  % Weight Loss:  Up to 5% in 1 month (moderate malnutrition)  % Intake:  <75% for > 7 days (moderate malnutrition)  Subcutaneous Fat Loss:  Orbital region mild depletion and Upper arm region mild depletion  Muscle Loss:  Temporal region mild depletion and Clavicle bone region mild depletion  Fluid Retention:  None noted     Malnutrition Diagnosis: Moderate malnutrition  In Context of:  Acute on Chronic illness or disease     EVALUATION OF PROGRESS TOWARD GOALS   Diet:  2g Na, 2000mL fluid, no caffeine    Intake/Tolerance:    - 0-100% per flowsheet (variable)  - RN 3/8: 'unable to safely eat or drink as pt is too lethargic to even take meds'  - at visit, pt fast asleep. Per sitter, has not eaten anything yet today.    NEW FINDINGS:   Vitals:    03/05/22 0628 03/06/22 2300 03/07/22 1010 03/09/22 1157   Weight: 57.9 kg (127 lb 11.2 oz) 62.7 kg (138 lb 3.2 oz) 61 kg (134 lb 8 oz) 57.3 kg (126 lb 4.8 oz)    03/10/22 0700   Weight: 59.6 kg (131 lb 8 oz)     Med: B12, Lasix, MVI+M  Lab: BUN 40 (H, decr), Cr 1.98 (H)  GI: Last BM 3/9  Skin: trace edema, peeling/scaling  Dispo: Anticipate discharge back to [facility] when medically stable (SW following)    Per MD 3/8:   \"- appears to have alkalosis at least in part metabolic -I suspect this may be due to his decreased intake and chronic kidney disease.  Discussed with wife at bedside at length and explained that no clear etiology is otherwise found.  He does have significant baseline dementia and advanced age which are undoubtedly contributing.  We also discussed consideration of echocardiogram as well as head CT but at this point and with the lack of focal neurological deficit we agreed that we will hold off.  Will give 1 L normal " "saline with potassium over 4 hours and monitor.  Patient spouse is okay with a trial of CPAP if O2 saturations do not maintain due to apneic spells.  - Also discussed with patient spouse that given his comorbidities and age, his body may give out at any time.  Currently his blood pressure is stable.  She is clear that overall comfort is the main goal and aggressive testing is not in their wishes.  Please contact spouse if any deterioration overnight.\"    RN 3/8: CPAP isn't effective per RT, pt now on Bipap.     Previous Goals:   Intake of at least 75% meals TID.   Evaluation: Not met    Previous Nutrition Diagnosis:   Inadequate oral intake related to fluctuations in mental status, reported reduced appetite as evidenced by spouse concerned that pt is eating less than baseline since last hospitalization.   Evaluation: No change, updated below    CURRENT NUTRITION DIAGNOSIS  Inadequate oral intake related to fluctuations in mental status, lethargy, reported reduced appetite as evidenced by spouse concerned that pt is eating less than baseline since last hospitalization, varying intakes this admission.     INTERVENTIONS  Recommendations / Nutrition Prescription  2g Na, recommend diet liberalization as able given reduced intakes and stated goal of overall comfort vs aggressive testing.     Implementation  None today    Goals  Pt to consume at least 50% of meals TID.     MONITORING AND EVALUATION:  Progress towards goals will be monitored and evaluated per protocol and Practice Guidelines    Batsheva Bettencourt  Dietetic Intern  "

## 2022-03-11 NOTE — PLAN OF CARE
Goal Outcome Evaluation:  Alert, oriented to self only. VSS on 2L oxymask overnight-- did not tolerate CPAP. Pt agitated/restless overnight. C/o of groin and L hip pain. Given tylenol, B&O suppository, zyprexa, and ativan. Up A2 GB/W. On 2 gm Na diet, 2000 mL fluid restriction. King in place. Sitter at bedside. L PIV SL. Discharge pending.

## 2022-03-11 NOTE — PROGRESS NOTES
[facility]    Medicine Progress Note - Hospitalist Service    Date of Admission:  3/3/2022    Assessment & Plan        Irvin Bridges is an 89 year-old male with history of dementia, chronic kidney disease stage 4 and aortic atherosclerosis who presents via EMS with shortness of breath. Admitted 3/3/2022.     Acute on chronic systolic congestive heart failure (HFrEF)  Acute hypoxic respiratory failure secondary to flash pulmonary edema   Hypertension   Brought in to ED on 2L nasal cannula by EMS, though per notes not clear he was ever hypoxic prior to this  Following episode of agitation and administration of sedatives, was placed on non-rebreather for reported oxygen saturations of 80%.   CXR with worsening in engorged indistinct central pulmonary vasculature with increased interstitial markings consistent with worsening interstitial pulmonary edema  Acute hypoxia likely multifactorial from sedation and flash pulmonary edema potentially caused by severe agitation versus blood transfusion  Recently admitted 2/24-3/1/22 for acute hypoxic respiratory failure due to acute systolic CHF with pulmonary edema, probable type II NSTEMI for above. Medications adjusted by cardiology including initiating hydralazine and Imdur, discharged on PO furosemide  Echocardiogram 2/25 with EF 35-40%, early diastolic dysfunction   - PTA regimen amlodipine, carvedilol, furosemide, hydralazine, Imdur   - initially given IV lasix then PO on 3/6 AM, then stopped; resumed PO 3/10  - Daily weights, strict I/O - 3/11 net neg 8.9L, wts variable  --- procal rechecked and decreased at 0.2 from 0.5, less useful in renal failure.  Remains nontoxic. BP and HR WNL   - 3/8 VSS WNL despite amlodipine, carvedilol, lasix and hydralazine being held - monitor wt, O2 need, VSS.  - see below, CPAP/BIPAP ok if necessary and if tolerated  - 3/9 on bipap overnight, now satting well on 2L nc. CXR with some improvement in congestion. BNP  24k, not as high as previously when adm with HF.   - 3/10 - on oxymask at times with high 90s sat.  Resumed PTA PO lasix  - 3/11 - 1-2L O2 at times, likely can take off while awake  - Wean off oxygen as able. OOB/IS as tolerated.      Increased somnolence, apneic spells AM 3/8 - improved/resolving  Patient did receive olanzapine after 2 AM today-unclear if this is playing a role given its been several hours since then and he is still fairly somnolent, however he does have significant CKD.  He was arousable to noxious stimuli and reported no new pain but ongoing lower back pain.  No focal neurological deficit appreciated.  He is nontoxic in appearance and afebrile.  Vital signs have remained stable.  LFTs, ammonia unremarkable.  - ABG stat 7.51, pCO2 38, pO2 90, bicarb 30  - appears to have alkalosis at least in part metabolic -I suspect this may be due to his decreased intake and chronic kidney disease.  Discussed with wife at bedside at length and explained that no clear etiology is otherwise found.  He does have significant baseline dementia and advanced age which are undoubtedly contributing.  We also discussed consideration of echocardiogram as well as head CT but at this point and with the lack of focal neurological deficit we agreed that we will hold off.  Will give 1 L normal saline with potassium over 4 hours and monitor.  Patient spouse is okay with a trial of CPAP if O2 saturations do not maintain due to apneic spells.  - Also discussed with patient spouse that given his comorbidities and age, his body may give out at any time.  Currently his blood pressure is stable.  She is clear that overall comfort is the main goal and aggressive testing is not in their wishes.  Please contact spouse if any deterioration overnight.  - 3/9 - overall improved today-still having episodes of agitation and somnolence but was awakened by voice and followed commands, denied new issues  - 3/10 - 3/11 - improved, awakens to  voice, calm, no complaints - wife at bedside 3/11 - no sitter as of around noon     Dementia with behavioral disturbance  Calm and cooperative. Has been needing a sitter, however. Needs to be ok without sitter prior to [facility] accepting him.  - Delirum bundle.  - Prn meds.     Acute on chronic macrocytic anemia  *CBC drawn at TCU initially at 6.5 g/dl, recheck here 6.8 g/dl. Reportedly had some hematuria at TCU with clot noted at King tip, that resolved with removal.   *Hemoglobin in low 7 g/dls during most recent hospitalization. Iron studies not suggestive of deficiency, folate and B12 within normal limits.    - Transfused 1 unit in ED and 3/4 Hgb 8.4--9.1--8.6 --> 8.2 --> 7.1  --> 7.3     Urinary retention  Meatus bleeding secondary to trauma   King placed 2/26 due to retention, discharged with King with plan for TOV at TCU.  - Pulled King in TCU with some bleeding from balloon trauma   - King replaced in ED and bleeding resolved  - Continue tamsulosin     Chronic kidney disease, stage 4  Mild hyponatremia  Baseline creatinine 2.3-2.4 with GFR in 20s. Creatinine 2.4 on admission--2.04--2.54  - Lasix held evening 3/6 - creat 2.49 on 3/7, fairly stable. Sodium 130 3/7  - 3/8 Na 132, creat 2.2 (improved/stable)  - 3/9 Na 138, creat 1.9 -- about 2 on 3/10 --> 2.1  3/11  - PTA lasix resumed 3/10  - follow BMP     Depression  - Continue prior to admission escitalopram     Chronic low back pain  No acute change.   - Continue scheduled acetaminophen      Moderate malnutrition in setting of acute and chronic illness  % Weight Loss:  Up to 5% in 1 month (moderate malnutrition)  % Intake:  <75% for > 7 days (moderate malnutrition)  Subcutaneous Fat Loss:  Orbital region mild depletion and Upper arm region mild depletion  Muscle Loss:  Temporal region mild depletion and Clavicle bone region mild depletion  2g Na diet  - Appreciate Nutrition assistance  - Pt reviewed protein sources with dietitian  - Pt dislikes  protein supplements per spouse (does drink milkshakes)   - Thera vit M daily          Diet: 2 Gram Sodium Diet Other - please comment    DVT Prophylaxis: Pneumatic Compression Devices  King Catheter: PRESENT, indication: Retention  Central Lines: None  Cardiac Monitoring: None  Code Status: No CPR- Do NOT Intubate      Disposition Plan   Expected Discharge: 03/13/2022     Anticipated discharge location: nursing home    Delays:     1:1 Sitter still ordered - MD to assess  Oxygen Needs - Arrange Home O2            The patient's care was discussed with the Bedside Nurse, Patient and Patient's Family.    Ryan Garibay MD  Hospitalist Service  [facility]  Securely message with the Vocera Web Console (learn more here)  Text page via [employer]aging[employer]         Clinically Significant Risk Factors Present on Admission                    ______________________________________________________________________    Interval History   Patient seen and examined this afternoon. Spouse Melissa is at bedside. Roland continues to improve - awakens to voice, no complaints of pain, fever, or sob. Sitter off midday. Calm and cooperative.      Data reviewed today: I reviewed all medications, new labs and imaging results over the last 24 hours. I personally reviewed no images or EKG's today.    Physical Exam   Vital Signs: Temp: 99.2  F (37.3  C) Temp src: Oral BP: (!) 143/73 Pulse: 82   Resp: 16 SpO2: 95 % O2 Device: Nasal cannula Oxygen Delivery: 1 LPM  Weight: 131 lbs 3.2 oz    Gen: NAD, pleasant  HEENT: Normocephalic, EOMI, MMM  Resp: no crackles,  no wheezes, no increased work of resp  CV: S1S2 heard, reg rhythm, reg rate, no pedal edema  Abdo: soft, nontender, nondistended, bowel sounds present  Ext: calves nontender, well perfused  Neuro: AAOxself, hospital, dementia at baseline, CN grossly intact, no facial asymmetry      Data   Recent Labs   Lab 03/11/22  0648 03/10/22  0705 03/09/22  0766  03/08/22  0631   WBC 10.4 5.7 8.5 9.7   HGB 7.3* 7.1* 8.2* 8.2*   * 104* 102* 100    189 207 202    137 138 132*   POTASSIUM 3.9 3.9 4.2 3.7   CHLORIDE 100 104 105 98   CO2 27 28 27 30   BUN 41* 40* 43* 50*   CR 2.12* 1.98* 1.90* 2.22*   ANIONGAP 7 5 6 4   ABHI 8.4* 8.5 9.0 8.6   * 94 97 114*   ALBUMIN  --   --   --  2.5*   PROTTOTAL  --   --   --  6.1*   BILITOTAL  --   --   --  0.3   ALKPHOS  --   --   --  63   ALT  --   --   --  15   AST  --   --   --  17     No results found for this or any previous visit (from the past 24 hour(s)).

## 2022-03-11 NOTE — PROGRESS NOTES
Care Management Follow Up    Length of Stay (days): 7    Expected Discharge Date: 03/13/2022     Concerns to be Addressed: all concerns addressed in this encounter     Patient plan of care discussed at interdisciplinary rounds: Yes    Anticipated Discharge Disposition: Skilled Nursing Facilty     Anticipated Discharge Services: None  Anticipated Discharge DME: None    Patient/family educated on Medicare website which has current facility and service quality ratings: no  Education Provided on the Discharge Plan:    Patient/Family in Agreement with the Plan: yes    Referrals Placed by CM/SW: Post Acute Facilities  Private pay costs discussed: Not applicable    Additional Information:  SW placed call to Daiana with [facility] who stated that patient no longer had bed hold at their facility as family released the bed. However, they would be willing to accept patient back to their facility. Due to hx of behaviors, would prefer to take him back on Monday when they have better staffing. JOÃO left  for spouse to confirm if they would like for patient to return to facility upon discharge.       DAVID Song

## 2022-03-11 NOTE — PLAN OF CARE
Goal Outcome Evaluation:    Patient is confused, lethargic and oriented to self. VSS on 2L oxygen via NC. C/o left hip pain, gave Tylenol, effective. Up with assist of 2 and walker/gait belt. On 2gm Na+ diet, appetite improving. Up with strong assist of 2 and walker/gait belt. King with boarder line low urine output. Sitter at bedside. L PIV SL. Discharge pending.

## 2022-03-12 NOTE — PROGRESS NOTES
[facility]    Medicine Progress Note - Hospitalist Service    Date of Admission:  3/3/2022    Assessment & Plan        Irvin Bridges is an 89 year-old male with history of dementia, chronic kidney disease stage 4 and aortic atherosclerosis who presents via EMS with shortness of breath. Admitted 3/3/2022.     Acute on chronic systolic congestive heart failure (HFrEF)  Acute hypoxic respiratory failure secondary to flash pulmonary edema   Hypertension   Brought in to ED on 2L nasal cannula by EMS, though per notes not clear he was ever hypoxic prior to this  Following episode of agitation and administration of sedatives, was placed on non-rebreather for reported oxygen saturations of 80%.   CXR with worsening in engorged indistinct central pulmonary vasculature with increased interstitial markings consistent with worsening interstitial pulmonary edema  Acute hypoxia likely multifactorial from sedation and flash pulmonary edema potentially caused by severe agitation versus blood transfusion  Recently admitted 2/24-3/1/22 for acute hypoxic respiratory failure due to acute systolic CHF with pulmonary edema, probable type II NSTEMI for above. Medications adjusted by cardiology including initiating hydralazine and Imdur, discharged on PO furosemide  Echocardiogram 2/25 with EF 35-40%, early diastolic dysfunction   - PTA regimen amlodipine, carvedilol, furosemide, hydralazine, Imdur   - initially given IV lasix then PO on 3/6 AM, then stopped; resumed PO 3/10  - Daily weights, strict I/O - 3/11 net neg 8.9L, wts variable  --- procal rechecked and decreased at 0.2 from 0.5, less useful in renal failure.  Remains nontoxic. BP and HR WNL   - 3/8 VSS WNL despite amlodipine, carvedilol, lasix and hydralazine being held - monitor wt, O2 need, VSS.  - see below, CPAP/BIPAP ok if necessary and if tolerated  - 3/9 on bipap overnight, now satting well on 2L nc. CXR with some improvement in congestion. BNP  24k, not as high as previously when adm with HF.   - 3/10 - on oxymask at times with high 90s sat.  Resumed PTA PO lasix  - 3/11 - 1-2L O2 at times, likely can take off while awake  - 3/12 resume pta carvedilol with parameters  -- minimal O2 needs, ok on RA at times  - Wean off oxygen as able. OOB/IS as tolerated.      Increased somnolence, apneic spells AM 3/8 - improved/resolving  Patient did receive olanzapine after 2 AM today-unclear if this is playing a role given its been several hours since then and he is still fairly somnolent, however he does have significant CKD.  He was arousable to noxious stimuli and reported no new pain but ongoing lower back pain.  No focal neurological deficit appreciated.  He is nontoxic in appearance and afebrile.  Vital signs have remained stable.  LFTs, ammonia unremarkable.  - ABG stat 7.51, pCO2 38, pO2 90, bicarb 30  - appears to have alkalosis at least in part metabolic -I suspect this may be due to his decreased intake and chronic kidney disease.  Discussed with wife at bedside at length and explained that no clear etiology is otherwise found.  He does have significant baseline dementia and advanced age which are undoubtedly contributing.  We also discussed consideration of echocardiogram as well as head CT but at this point and with the lack of focal neurological deficit we agreed that we will hold off.  Will give 1 L normal saline with potassium over 4 hours and monitor.  Patient spouse is okay with a trial of CPAP if O2 saturations do not maintain due to apneic spells.  - Also discussed with patient spouse that given his comorbidities and age, his body may give out at any time.  Currently his blood pressure is stable.  She is clear that overall comfort is the main goal and aggressive testing is not in their wishes.  Please contact spouse if any deterioration overnight.  - 3/9 - overall improved today-still having episodes of agitation and somnolence but was awakened by  voice and followed commands, denied new issues  - 3/10 - 3/12 - improved, awakens to voice, calm, no complaints - wife at bedside 3/11 - no sitter as of around noon  - 3/12 on RA, up in chair eating lunch and conversing with Melissa (spouse)     Dementia with behavioral disturbance  Calm and cooperative. Has been needing a sitter, however. Needs to be ok without sitter prior to [facility] accepting him.  - Delirum bundle.  - Prn meds.     Acute on chronic macrocytic anemia  *CBC drawn at TCU initially at 6.5 g/dl, recheck here 6.8 g/dl. Reportedly had some hematuria at TCU with clot noted at King tip, that resolved with removal.   *Hemoglobin in low 7 g/dls during most recent hospitalization. Iron studies not suggestive of deficiency, folate and B12 within normal limits.    - Transfused 1 unit in ED and 3/4 Hgb 8.4--9.1--8.6 --> 8.2 --> 7.1  --> 7.3     Urinary retention  Meatus bleeding secondary to trauma   King placed 2/26 due to retention, discharged with King with plan for TOV at TCU.  - Pulled King in TCU with some bleeding from balloon trauma   - King replaced in ED and bleeding resolved  - Continue tamsulosin     Chronic kidney disease, stage 4  Mild hyponatremia  Baseline creatinine 2.3-2.4 with GFR in 20s. Creatinine 2.4 on admission--2.04--2.54  - Lasix held evening 3/6 - creat 2.49 on 3/7, fairly stable. Sodium 130 3/7  - 3/8 Na 132, creat 2.2 (improved/stable)  - 3/9 Na 138, creat 1.9 -- about 2 on 3/10 --> 2.1  3/11  - PTA lasix resumed 3/10  - follow BMP     Depression  - Continue prior to admission escitalopram     Chronic low back pain  No acute change.   - Continue scheduled acetaminophen      Moderate malnutrition in setting of acute and chronic illness  % Weight Loss:  Up to 5% in 1 month (moderate malnutrition)  % Intake:  <75% for > 7 days (moderate malnutrition)  Subcutaneous Fat Loss:  Orbital region mild depletion and Upper arm region mild depletion  Muscle Loss:  Temporal  region mild depletion and Clavicle bone region mild depletion  2g Na diet  - Appreciate Nutrition assistance  - Pt reviewed protein sources with dietitian  - Pt dislikes protein supplements per spouse (does drink milkshakes)   - Thera vit M daily       Diet: 2 Gram Sodium Diet Other - please comment    DVT Prophylaxis: Pneumatic Compression Devices  King Catheter: PRESENT, indication: Retention  Central Lines: None  Cardiac Monitoring: None  Code Status: No CPR- Do NOT Intubate      Disposition Plan   Expected Discharge: 03/13/2022     Anticipated discharge location: nursing home    Delays:     1:1 Sitter still ordered - MD to assess  Oxygen Needs - Arrange Home O2            The patient's care was discussed with the Bedside Nurse, Patient and Patient's Family.    Ryan Garibay MD  Hospitalist Service  [facility]  Securely message with the Vocera Web Console (learn more here)  Text page via [employer] Paging/Directory         Clinically Significant Risk Factors Present on Admission                    ______________________________________________________________________    Interval History   Patient seen and examined with spouse Melissa present.  He continues to improve and is sitting up in chair eating cookies and conversing with his spouse.  No complaints-denies shortness of breath, fever/chills, or pain.  Probable TCU discharge Monday per social work medication.  Appreciate their efforts    Data reviewed today: I reviewed all medications, new labs and imaging results over the last 24 hours. I personally reviewed no images or EKG's today.    Physical Exam   Vital Signs: Temp: 98.5  F (36.9  C) Temp src: Axillary BP: (!) 145/72 Pulse: 85   Resp: 16 SpO2: 96 % O2 Device: None (Room air) Oxygen Delivery: 1 LPM  Weight: 131 lbs 3.2 oz    Gen: NAD, pleasant, up in chair  HEENT: Normocephalic, EOMI, MMM  Resp: no focal crackles,  no wheezes, no increased work of resp  CV: S1S2 heard, reg  "rhythm, feg rate, no pedal edema  Abdo: soft, nontender, nondistended, bowel sounds present  Ext: calves nontender, well perfused  Neuro: AAOxself and wife, needs reminder of \"how/why he is here\" - baseline dementia noted, CN grossly intact, no facial asymmetry      Data   Recent Labs   Lab 03/12/22  0657 03/11/22  0648 03/10/22  0705 03/09/22  0725 03/08/22  0631   WBC  --  10.4 5.7 8.5 9.7   HGB  --  7.3* 7.1* 8.2* 8.2*   MCV  --  102* 104* 102* 100   PLT  --  201 189 207 202   NA  --  134 137 138 132*   POTASSIUM 3.7 3.9 3.9 4.2 3.7   CHLORIDE  --  100 104 105 98   CO2  --  27 28 27 30   BUN  --  41* 40* 43* 50*   CR  --  2.12* 1.98* 1.90* 2.22*   ANIONGAP  --  7 5 6 4   ABHI  --  8.4* 8.5 9.0 8.6   GLC  --  105* 94 97 114*   ALBUMIN  --   --   --   --  2.5*   PROTTOTAL  --   --   --   --  6.1*   BILITOTAL  --   --   --   --  0.3   ALKPHOS  --   --   --   --  63   ALT  --   --   --   --  15   AST  --   --   --   --  17     No results found for this or any previous visit (from the past 24 hour(s)).  "

## 2022-03-12 NOTE — PLAN OF CARE
Goal Outcome Evaluation:      Pt is A & O to self only.  Calm today, however requesting to see wife repetitively, once pt's wife left home. VSS on 2L O2. Denies pain.  On 2 gm Na diet, 2000 mL fluid restriction. Up in chair with meals.  2 g Na diet, +2000 ml fluid restrictions- Good appetite. Pt consumed all fluids for the day.  Up A2 GB/W. King patent, adequate output. Sitter discontinued at noon today.  L PIV SL. Discharge pending, most likely on Monday to  [facility].

## 2022-03-12 NOTE — PROVIDER NOTIFICATION
MD Notification    Notified Person: MD    Notified Person Name: Dr. Garibay    Notification Date/Time: 3/12/22 at 1230    Notification Interaction: vocera pager    Purpose of Notification:  No BM since 3/5/22 per chart review. Available prn meds not effective.    Orders Received:  Verbal order for Daily prn Dulcolax suppository  10 mg  Taken.     Comments: order placed per MD order

## 2022-03-12 NOTE — PLAN OF CARE
Goal Outcome Evaluation:        A&O to self only, was able to say the correct month. VSS on 1L O2, attempt for RA but had episodes of dropping to high 80s. Calm and pleasant during shift. Up A2 + gbw  into W/C for ride around unit in mik. On 2gm Na diet, 2L fluid restriction - drank 120ml during shift. King patent, adequate output. No BM during shift - second dose of senna given in mik. L PIV SL. Discharge pending for Monday to [facility].

## 2022-03-13 NOTE — PROGRESS NOTES
RRT called for sudden severe abdominal pain unrelieved with tylenol and B & O suppository. Pain happened about 30 minutes post large bowel movement. VSS, on RA,  however pain is uncontrolled. Bilateral bulge noted to lower abdominal area as well.  RRT was call, and CT scan was ordered. Report was given to oncoming nurse, and RRT team.

## 2022-03-13 NOTE — PROGRESS NOTES
Care Management Follow Up    Length of Stay (days): 9    Expected Discharge Date: 03/14/2022     Concerns to be Addressed: all concerns addressed in this encounter     Patient plan of care discussed at interdisciplinary rounds: Yes    Anticipated Discharge Disposition: Skilled Nursing Facilty     Anticipated Discharge Services: None  Anticipated Discharge DME: None    Patient/family educated on Medicare website which has current facility and service quality ratings: no  Education Provided on the Discharge Plan:    Patient/Family in Agreement with the Plan: yes    Referrals Placed by CM/SW: Post Acute Facilities  Private pay costs discussed: Not applicable    Additional Information:  SW spoke with [facility] admissions to confirm patient can be admitted if cleared for discharge. Arranged [facility] stretcher transport 11am. SW left  for patient's spouse requesting a return call.       DAVID Claros

## 2022-03-13 NOTE — PROGRESS NOTES
[facility]    Medicine Progress Note - Hospitalist Service    Date of Admission:  3/3/2022    Assessment & Plan        Irvin Bridges is an 89 year-old male with history of dementia, chronic kidney disease stage 4 and aortic atherosclerosis who presents via EMS with shortness of breath. Admitted 3/3/2022.     Acute on chronic systolic congestive heart failure (HFrEF)  Acute hypoxic respiratory failure secondary to flash pulmonary edema   Hypertension   Brought in to ED on 2L nasal cannula by EMS, though per notes not clear he was ever hypoxic prior to this  Following episode of agitation and administration of sedatives, was placed on non-rebreather for reported oxygen saturations of 80%. Initial workup consistent with heart failure exacerbation with pulmonary edema - possibly worsened by blood transfusion.   >Recently admitted 2/24-3/1/22 for acute hypoxic respiratory failure due to acute systolic CHF with pulmonary edema, probable type II NSTEMI for above. Medications adjusted by cardiology including initiating hydralazine and Imdur, discharged on PO furosemide  >Echocardiogram 2/25 with EF 35-40%, early diastolic dysfunction   >PTA regimen amlodipine, carvedilol, furosemide, hydralazine, Imdur   - initially diuresed with IV lasix, then held.  PO lasix resumed 3/10.   - Daily weights, strict I/O - 3/13 net neg 10.5L, wts variable  - procal rechecked and decreased at 0.2 from 0.5, less useful in renal failure.  Remains nontoxic. BP and HR WNL   - 3/8 - 3/12 weaned from bipap to RA at times, prn O2 via nasal cannula.  Slowly reintroduced his cardiac meds including diuretic.   -- minimal O2 needs, ok on RA at times, OOB/IS    Bilateral inguinal hernias  3/12 evening - RRT for new, sudden onset lower abdo pain after large BM with help of suppository. CT did not show signs requiring urgent surgical intervention but pain persisted, Gen surg evaluated at bedside in middle of night and was able  to reduce bilateral hernias with pt in Trendelenburg. Pain subsided. Kept NPO for surgery to round on in AM 3/13.      Increased somnolence, apneic spells AM 3/8 - improved/resolving  Possible sundowning/hospitial delirium   Unclear exact etiology, likely multifactorial with advanced age dementia in hospital and decreased PO intake as well as receiving zyprexa in middle of night. ABG did not show hypercapnia.  LFT, ammonia unremarkable. No focal neuro changes. Wife agreed conservative work up given overall picture. Slowly improved and as of 3/12 nearly back to baseline - sitting up in chair eating, conversing with spouse.  - monitor, reorient as needed, encourage normal sleep/wake cycle     Dementia with behavioral disturbance  Calm and cooperative. Has been needing a sitter, however. Needs to be ok without sitter prior to [facility] accepting him.  - Delirum bundle.  - Prn meds.     Acute on chronic macrocytic anemia  *CBC drawn at TCU initially at 6.5 g/dl, recheck here 6.8 g/dl. Reportedly had some hematuria at TCU with clot noted at King tip, that resolved with removal.   *Hemoglobin in low 7 g/dls during most recent hospitalization. Iron studies not suggestive of deficiency, folate and B12 within normal limits.    - Transfused 1 unit in ED and 3/4 Hgb 8.4--9.1--8.6 --> 8.2 --> 7.1  --> 7.3 -> 7.3     Urinary retention  Meatus bleeding secondary to trauma   King placed 2/26 due to retention, discharged with King with plan for TOV at TCU.  - Pulled King in TCU with some bleeding from balloon trauma   - King replaced in ED and bleeding resolved  - Continue tamsulosin     Chronic kidney disease, stage 4  Mild hyponatremia - stable  Baseline creatinine 2.3-2.4 with GFR in 20s. Creatinine 2.4 on admission--2.04--2.54  - Lasix held evening 3/6 - creat 2.49 on 3/7, fairly stable. Sodium 130 3/7  - 3/8 Na 132, creat 2.2 (improved/stable)  - 3/9 Na 138, creat 1.9 -- about 2 on 3/10 --> 2.1  3/11 --> 2.1  3/13  - PTA lasix resumed 3/10  - follow BMP periodically     Depression  - Continue prior to admission escitalopram     Chronic low back pain  No acute change.   - Continue scheduled acetaminophen      Moderate malnutrition in setting of acute and chronic illness  % Weight Loss:  Up to 5% in 1 month (moderate malnutrition)  % Intake:  <75% for > 7 days (moderate malnutrition)  Subcutaneous Fat Loss:  Orbital region mild depletion and Upper arm region mild depletion  Muscle Loss:  Temporal region mild depletion and Clavicle bone region mild depletion  2g Na diet  - Appreciate Nutrition assistance  - Pt reviewed protein sources with dietitian  - Pt dislikes protein supplements per spouse (does drink milkshakes)   - Thera vit M daily         Diet: NPO for Medical/Clinical Reasons Except for: Meds    DVT Prophylaxis: Pneumatic Compression Devices  King Catheter: PRESENT, indication: Retention  Central Lines: None  Cardiac Monitoring: None  Code Status: No CPR- Do NOT Intubate      Disposition Plan   Expected Discharge: 03/14/2022     Anticipated discharge location: nursing home    Delays:     1:1 Sitter still ordered - MD to assess  Oxygen Needs - Arrange Home O2            The patient's care was discussed with the Bedside Nurse, Patient, general surgery Consultant and will try to speak with spouse Melissa in person..    Ryan Garibay MD  Hospitalist Service  [facility]  Securely message with the [employer] Web Console (learn more here)  Text page via [employer] Paging/Directory     Total encounter time 30 minutes greater than 50% spent in direct patient care, discussion and counseling with patient/family, coordination of care with consultants, and coordination of care with RN staff    Clinically Significant Risk Factors Present on Admission                    ______________________________________________________________________    Interval History   Patient seen and examined late morning.   Currently lying in bed without any complaints-no abdominal pain.  Chart reviewed with RRT overnight for sudden onset abdominal pain after bowel movement with hernias noted.  Appreciate general surgery evaluation.  Discussed with them and he is okay to eat as they are easily reducible and he is totally symptom-free.  He was a little confused about why he is here which is not unusual given his baseline dementia.  Currently denies any shortness of breath, fevers or chills.    Data reviewed today: I reviewed all medications, new labs and imaging results over the last 24 hours. I personally reviewed no images or EKG's today.    Physical Exam   Vital Signs: Temp: 97.5  F (36.4  C) Temp src: Oral BP: 118/58 Pulse: 74   Resp: 16 SpO2: 95 % O2 Device: Nasal cannula Oxygen Delivery: 2 LPM  Weight: 131 lbs 3.2 oz    Gen: NAD, pleasantly confused  HEENT: Normocephalic, EOMI, MMM  Resp: no crackles,  no wheezes, no increased work of resp  CV: S1S2 heard, reg rhythm, reg rate, no pedal edema  Abdo: soft, nontender, nondistended, bowel sounds present  Ext: calves nontender, well perfused  Neuro: AAOxself, baseline dementia, CN grossly intact, no facial asymmetry      Data   Recent Labs   Lab 03/13/22  0742 03/12/22  0657 03/11/22  0648 03/10/22  0705 03/09/22  0725 03/08/22  0631   WBC 7.9  --  10.4 5.7   < > 9.7   HGB 7.3*  --  7.3* 7.1*   < > 8.2*   *  --  102* 104*   < > 100     --  201 189   < > 202     --  134 137   < > 132*   POTASSIUM 3.4 3.7 3.9 3.9   < > 3.7   CHLORIDE 101  --  100 104   < > 98   CO2 27  --  27 28   < > 30   BUN 47*  --  41* 40*   < > 50*   CR 2.13*  --  2.12* 1.98*   < > 2.22*   ANIONGAP 7  --  7 5   < > 4   ABHI 8.8  --  8.4* 8.5   < > 8.6   *  --  105* 94   < > 114*   ALBUMIN  --   --   --   --   --  2.5*   PROTTOTAL  --   --   --   --   --  6.1*   BILITOTAL  --   --   --   --   --  0.3   ALKPHOS  --   --   --   --   --  63   ALT  --   --   --   --   --  15   AST  --   --   --    --   --  17    < > = values in this interval not displayed.     Recent Results (from the past 24 hour(s))   CT Abdomen Pelvis w/o Contrast    Narrative    EXAM: CT ABDOMEN PELVIS W/O CONTRAST  LOCATION: [facility]  DATE/TIME: 3/12/2022 7:44 PM    INDICATION: Abdominal pain, hernia suspected  COMPARISON: None.  TECHNIQUE: CT scan of the abdomen and pelvis was performed without IV contrast. Multiplanar reformats were obtained. Dose reduction techniques were used.  CONTRAST: None.    FINDINGS:   LOWER CHEST: Small bilateral pleural effusions and bibasilar atelectasis.    HEPATOBILIARY: Cholecystectomy.    PANCREAS: Normal.    SPLEEN: Normal.    ADRENAL GLANDS: Normal.    KIDNEYS/BLADDER: Multiple right renal cysts, the largest measuring 5 cm. Mild cortical scarring left kidney, otherwise unremarkable. No renal calculi or hydronephrosis. 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 King catheter and a small amount of air   in the bladder.    BOWEL: Bilateral inguinal hernias both containing nondilated small bowel loops. The colon is unremarkable. No wall thickening or inflammatory changes. No obstruction.    LYMPH NODES: Normal.    VASCULATURE: Atherosclerotic disease abdominal aorta and iliac arteries.    PELVIC ORGANS: Normal.    MUSCULOSKELETAL: Advanced degenerative disc disease and hypertrophic changes lower thoracic and lumbar spine.      Impression    IMPRESSION:   1.  Bilateral inguinal hernias, both containing nondilated small bowel loops. No evidence for obstruction.  2.  Large right renal cysts.  3.  Cholecystectomy.  4.  Bilateral pleural effusions and bibasilar atelectasis.

## 2022-03-13 NOTE — PLAN OF CARE
Goal Outcome Evaluation:      Pt is A & O to self and place. Pleasant and cooperative.  VSS on RA. On 2 gm Na diet, 2000 mL fluid restriction. Up in chair with meals. Good appetite.  Up A2 GB/W. King patent, adequate output.  PRN Senna, Miralax, and suppository given for no BM since 3/9/22, with good results- had  1 large BM.  Pt denied feeling constipated. L PIV SL. Discharge pending: will return most likely on Monday to [facility].

## 2022-03-13 NOTE — PLAN OF CARE
Pt is A & O to self. Pleasant and cooperative.  No abdominal pain or discomfort reported. Pt comfortable today. VSS on RA. On 2 gm Na diet, 2000 mL fluid restriction, tolerating well. Up in chair for lunch and dinner. Good appetite. Up A2 GB/W. King patent, adequate output. R & L PIV SL. Discharge pending Monday to [facility] via stretcher ride -scheduled for 11:00.

## 2022-03-13 NOTE — PROGRESS NOTES
"General Surgery Progress Note    Admission Date: 3/3/2022  Today's Date: 3/13/2022         Assessment:      Patient is a 89 year old male admitted with CHF exacerbation and overnight following a bowel movement began having abdominal pain. He was noted to have bilateral incarcerated inguinal hernias. CT completed and revealed non dilated bowel in bilateral inguinal hernias, no evidence of obstruction. Dr. Aguilar was called at 3am due to increasing abdominal pain and came to evaluate the patient. Hernias were easily reduced, abdomen soft and nontender.         Plan:   - Continue management of medical issues per primary team  - No indication for urgent surgical intervention. Hernias are easily reducible, patient is completely free of pain  - Consider outpatient elective repair once medical issues are optimized. I have provided our contact information in the patient's AVS  - We will sign off, please call with questions        Interval History:   Afebrile, denies abdominal pain. Having bowel function. Has been kept NPO awaiting surgical recommendations but is interested in trying food and going for a walk.          Physical Exam:   /58 (BP Location: Left arm)   Pulse 74   Temp 97.5  F (36.4  C) (Oral)   Resp 16   Ht 1.651 m (5' 5\")   Wt 59.5 kg (131 lb 3.2 oz)   SpO2 95%   BMI 21.83 kg/m    I/O last 3 completed shifts:  In: 1090 [P.O.:1090]  Out: 2050 [Urine:2050]  General: NAD, pleasant, awake. Affect flat. Answers questions  Respiratory: non-labored breathing  Abdomen: soft, nondistended, completely nontender  Groin: bilateral inguinal hernias are visible and palpable with patient lying supine. Soft and easily reducible with gentle pressure.     LABS:  Recent Labs   Lab Test 03/13/22  0742 03/11/22  0648 03/10/22  0705   WBC 7.9 10.4 5.7   HGB 7.3* 7.3* 7.1*   * 102* 104*    201 189      Recent Labs   Lab Test 03/13/22  0742 03/12/22  0657 03/11/22  0648 03/10/22  0705   POTASSIUM 3.4 3.7 " 3.9 3.9   CHLORIDE 101  --  100 104   CO2 27  --  27 28   BUN 47*  --  41* 40*   CR 2.13*  --  2.12* 1.98*   ANIONGAP 7  --  7 5     -------------------------------    Alva Beyer PA-C  [facility]  [phone]

## 2022-03-13 NOTE — CONSULTS
"[facility] General Surgery Consultation    Irvin Bridges MRN# [id number]   YOB: 1932 Age: 89 year old      Date of Admission:  3/3/2022  Date of Consult: 3/13/2022         Assessment and Plan:   Patient is a 89 year old male admitted with CHF exacerbation and overnight following a bowel movement began having abdominal pain. He was noted to have bilateral incarcerated inguinal hernias. CT completed and revealed non dilated bowel in bilateral inguinal hernias, no evidence of obstruction. I was called at 3am due to increasing abdominal pain and came to evaluate the patient. Pt is comfortable at the time of my arrival and reports only mild abdominal discomfort. With the patient lying in trendelenberg position, bilateral inguinal hernias are easily reduced. Abdomen is soft and not tender.     PLAN:  Monitor symptoms  No need for emergent surgery tonight, consider elective repair   Acute care surgery will evaluate again in am          Requesting Physician:      Lisa Escoto CNP        Chief Complaint:     Chief Complaint   Patient presents with     Abnormal Labs          History of Present Illness:   Irvin Bridges is a 89 year old male who was seen in consultation at the request of Lisa Escoto CNP who presented with CHF with flash pulmonary edema. As above noted to have bilateral inguinal hernias and then having pain after large bowel movement in the evening 3/13. .            Physical Exam:   Blood pressure 109/67, pulse 77, temperature 97.7  F (36.5  C), temperature source Oral, resp. rate 20, height 1.651 m (5' 5\"), weight 59.5 kg (131 lb 3.2 oz), SpO2 97 %.  131 lbs 3.2 oz  General: lying in bed, appears comfortable  Psych: Alert and Oriented.  Normal affect  Neurological: grossly intact  Eyes: Sclera clear  Respiratory:  nonlabored breathing  Cardiovascular:  Regular Rate and Rhythm   GI: soft, mildly tender in lower abdomen  : bilateral inguinal hernias present, mild bulge " noted in bilateral groin. With hernia exam, able to easily reduce bilateral hernias with minimal pressure. Abdomen remains soft and minimally tender after reduction.    Lymphatic/Hematologic/Immune:  No femoral or cervical lymphadenopathy.  Integumentary:  No rashes         Past Medical History:     Past Medical History:   Diagnosis Date     Aortic atherosclerosis (H)      Chronic kidney disease, stage 4 (severe) (H)      Chronic low back pain      Chronic systolic heart failure (H)      Dementia (H)      Depression      Macrocytic anemia      Urinary retention             Past Surgical History:   No past surgical history on file.         Current Medications:           amLODIPine  5 mg Oral Daily     carvedilol  12.5 mg Oral BID w/meals     cyanocobalamin  1,000 mcg Oral Daily     escitalopram  5 mg Oral Daily     furosemide  40 mg Oral BID     hydrALAZINE  25 mg Oral Q8H LACY     isosorbide mononitrate  30 mg Oral Daily     lidocaine  1 patch Transdermal Q24H     lidocaine   Transdermal Q8H     multivitamin w/minerals  1 tablet Oral Daily     sodium chloride (PF)  3 mL Intracatheter Q8H     tamsulosin  0.4 mg Oral Daily       acetaminophen **OR** acetaminophen, bisacodyl, - MEDICATION INSTRUCTIONS -, hydrALAZINE, HYDROmorphone, lidocaine 4%, lidocaine, lidocaine (buffered or not buffered), LORazepam, melatonin, naloxone **OR** naloxone **OR** naloxone **OR** naloxone, OLANZapine zydis, ondansetron **OR** ondansetron, opium-belladonna, polyethylene glycol, prochlorperazine **OR** prochlorperazine **OR** prochlorperazine, ACE/ARB/ARNI NOT PRESCRIBED, senna-docusate **OR** senna-docusate, sodium chloride (PF)         Home Medications:     Prior to Admission medications    Medication Sig Last Dose Taking? Auth Provider   acetaminophen (TYLENOL) 500 MG tablet Take 1,000 mg by mouth 3 times daily  3/3/2022 at am Yes Unknown, Entered By History   amLODIPine (NORVASC) 5 MG tablet Take 1 tablet (5 mg) by mouth daily 3/3/2022  at am Yes Wang Garner, DO   calcium carbonate (TUMS) 500 MG chewable tablet Take 1 chew tab by mouth 2 times daily as needed for heartburn prn Yes Unknown, Entered By History   carvedilol (COREG) 12.5 MG tablet Take 12.5 mg by mouth 2 times daily 3/3/2022 at am Yes Unknown, Entered By History   escitalopram (LEXAPRO) 5 MG tablet Take 5 mg by mouth daily 3/3/2022 at m Yes Unknown, Entered By History   furosemide (LASIX) 40 MG tablet Take 1 tablet (40 mg) by mouth daily 3/3/2022 at am Yes Wang Garner DO   hydrALAZINE (APRESOLINE) 25 MG tablet Take 1 tablet (25 mg) by mouth every 8 hours  Patient taking differently: Take 25 mg by mouth every 8 hours 0000,0800,1600 3/3/2022 at [id number] Yes Wang Garner, DO   isosorbide mononitrate (IMDUR) 30 MG 24 hr tablet Take 1 tablet (30 mg) by mouth daily 3/3/2022 at am Yes Wang Garner, DO   nitroGLYcerin (NITROSTAT) 0.4 MG sublingual tablet For chest pain place 1 tablet under the tongue every 5 minutes for 3 doses. If symptoms persist 5 minutes after 1st dose call 911. prn Yes Wang Garner, DO   polyethylene glycol-propylene glycol (SYSTANE ULTRA) 0.4-0.3 % SOLN ophthalmic solution Place 1 drop into both eyes every hour as needed for dry eyes prn Yes Unknown, Entered By History   potassium chloride ER (KLOR-CON M) 20 MEQ CR tablet Take 20 mEq by mouth 2 times daily 3/3/2022 at am Yes Unknown, Entered By History   sennosides (SENOKOT) 8.6 MG tablet Take 1-2 tablets by mouth 2 times daily as needed for constipation prn Yes Wang Garner DO   simethicone (MYLICON) 80 MG chewable tablet Take 1 tablet (80 mg) by mouth every 6 hours as needed for cramping prn Yes Wang Garner DO   tamsulosin (FLOMAX) 0.4 MG capsule Take 1 capsule (0.4 mg) by mouth daily 3/3/2022 at am Yes Wang Garner, DO   vitamin B-12 (CYANOCOBALAMIN) 1000 MCG tablet Take 1 tablet by mouth daily 3/3/2022 at am Yes Unknown,  Entered By History            Allergies:     Allergies   Allergen Reactions     Lactose Unknown     Memantine Other (See Comments)     Ampicillin Other (See Comments) and Unknown     Unknown, states he hasn't had it in 40 yrs         Rosuvastatin Other (See Comments) and Unknown     myositis                Family History:     Family History   Problem Relation Age of Onset     Alzheimer Disease Mother      Heart Disease Father      Meniere's disease Sister            Social History:   Irvin CONWAY Yuliana  reports that he has never smoked. He does not have any smokeless tobacco history on file. He reports previous alcohol use.          Review of Systems:   The 10 point Review of Systems is negative other than noted in the HPI.         Labs/Imaging   All new lab and imaging data was reviewed.   I have personally reviewed the imaging studies including his CT        Sultana Aguilar MD

## 2022-03-13 NOTE — PROGRESS NOTES
Surgery Note    I was called by Lisa Escoto CNP regarding this patient as he was having a bowel movement and then began having pain in his bilateral groin. She evaluated the patient and he has bilateral inguinal hernias which she was unable to reduce. He had a Ct abd/pelvis which revealed bilateral inguinal hernias with small bowel in both which is not obstructed. On my review, there is no evidence of inflammation or pneumatosis/concern for bowel ischemia. If pt has ongoing or worsening pain despite pain medication, please call and let me know and will evaluate overnight; however, given imaging findings, no need for emergent surgical intervention tonight. I will add pt to our acute care surgery list to be evaluated in the am. Can try giving pain medication, placing in trendelenberg and attempting to reduce at bedside. Lisa is in agreement with this plan.     Sultana Aguilar MD  [facility]  [phone]

## 2022-03-13 NOTE — PROGRESS NOTES
03/13/22 1500   Quick Adds   Type of Visit Initial PT Evaluation   Living Environment   People in Home spouse   Current Living Arrangements condominium   Home Accessibility no concerns   Living Environment Comments No concerns, walk-in shower. Receives assist for dressing, bathing, and all ADLs at baseline from spouse or other.   Self-Care   Usual Activity Tolerance moderate   Current Activity Tolerance poor   Regular Exercise No   Equipment Currently Used at Home walker, standard;shower chair;grab bar, toilet   Fall history within last six months no   Activity/Exercise/Self-Care Comment Uses FWW at baseline.    General Information   Onset of Illness/Injury or Date of Surgery 03/03/22   Referring Physician Ryan Garibay MD   Patient/Family Therapy Goals Statement (PT) home when able   Pertinent History of Current Problem (include personal factors and/or comorbidities that impact the POC) Patient is a 89 year old male admitted with CHF exacerbation and overnight following a bowel movement began having abdominal pain. He was noted to have bilateral incarcerated inguinal hernias. CT completed and revealed non dilated bowel in bilateral inguinal hernias, no evidence of obstruction. Dr. Aguilar was called at 3am due to increasing abdominal pain and came to evaluate the patient. Hernias were easily reduced, abdomen soft and nontender.   Existing Precautions/Restrictions fall   Cognition   Affect/Mental Status (Cognition) confused   Orientation Status (Cognition) oriented to;person;place   Cognitive Status Comments pt has dementia at baseline   Pain Assessment   Patient Currently in Pain Yes, see Vital Sign flowsheet  (back pain from laying)   Integumentary/Edema   Integumentary/Edema no deficits were identifed   Posture    Posture Forward head position   Range of Motion (ROM)   Range of Motion ROM is WFL   ROM Comment L shoulder limited by pain/weakness, L knee ROM limited d/t pain   Strength Comprehensive  (MMT)   Comment, General Manual Muscle Testing (MMT) Assessment Bilateral LE 4/5   Strength (Manual Muscle Testing)   Strength (Manual Muscle Testing) Deficits observed during functional mobility   Transfers   Comment, (Transfers) mod assist sit to stand with FWW   Gait/Stairs (Locomotion)   Carter Level (Gait) minimum assist (75% patient effort)   Assistive Device (Gait) walker, front-wheeled   Distance in Feet (Required for LE Total Joints) 10'   Pattern (Gait) step-to   Comment, (Gait/Stairs) unsteady gait with FWW   Balance   Balance Comments requires assist and UE support   Sensory Examination   Sensory Perception patient reports no sensory changes   Coordination   Coordination no deficits were identified   Clinical Impression   Criteria for Skilled Therapeutic Intervention Yes, treatment indicated   PT Diagnosis (PT) impaired functional mobility   Influenced by the following impairments muscle weakness, decreased activity tolerance, pain, impaired balance   Functional limitations due to impairments difficulty with transfers, ambulation, and functional mobility   Clinical Presentation (PT Evaluation Complexity) Stable/Uncomplicated   Clinical Presentation Rationale clinical judgement   Clinical Decision Making (Complexity) low complexity   Planned Therapy Interventions (PT) balance training;gait training;transfer training;strengthening;bed mobility training;patient/family education   Anticipated Equipment Needs at Discharge (PT) other (see comments)  (pt owns walker)   Risk & Benefits of therapy have been explained evaluation/treatment results reviewed;care plan/treatment goals reviewed;risks/benefits reviewed;current/potential barriers reviewed;participants voiced agreement with care plan;participants included;patient;spouse/significant other   PT Discharge Planning   PT Discharge Recommendation (DC Rec) Transitional Care Facility   PT Rationale for DC Rec Pt is significantly below baseline functional  mobility. Normally lives in condo with his wife, ambulates with wh walker, spouse has cleaning service, does cooking etc. Patient has dementia at baseline. Currently, pt is deconditioned and weak, resulting in limited functional mobility. Requiring assist of 1 for all functional mobility; recommending TCU for progression of strength and functional mobility.   PT Brief overview of current status mod A for STS, min A amb with FWW, falls risk   Plan of Care Review   Plan of Care Reviewed With patient;spouse   Total Evaluation Time   Total Evaluation Time (Minutes) 10   Physical Therapy Goals   PT Frequency 5x/week   PT Predicated Duration/Target Date for Goal Attainment 03/17/22   PT Goals Bed Mobility;Transfers;Gait   PT: Bed Mobility Modified independent;Supine to/from sit   PT: Transfers Supervision/stand-by assist;Sit to/from stand;Bed to/from chair;Assistive device   PT: Gait Supervision/stand-by assist;100 feet;Rolling walker

## 2022-03-13 NOTE — PLAN OF CARE
Goal Outcome Evaluation:        Pt is A & O to self and place. Pleasant and cooperative.  VSS on 1L 02. RRT called at start of shift as pt had sudden onset of lower abdominal pain after a large BM. CT complete - bilateral inguinal hernias found. Q2h IV dilaudid ordered for comfort and pt placed flat/trendelenburg until surgery can re-evaluate in AM. 0200 - pt had sudden increase in pain and discomfort with no relief from pain meds for 1 hour - surgery paged and completed bedside reduction. NPO until AM decision on surgery. Up A2 GB/W. King patent, adequate output.  R & L PIV SL. Discharge pending: will return most likely on Monday to [facility].

## 2022-03-13 NOTE — PROVIDER NOTIFICATION
MD Notification    Notified Person: MD    Notified Person Name: Lauren    Notification Date/Time: 3/13/2022  0210    Notification Interaction: Phone page    Purpose of Notification:  Uncontrolled pain relief with IV dilaudid. Trendelenburg unsuccessful.    Orders Received: Provider will come to bedside to perform reduction.    Comments:

## 2022-03-13 NOTE — PROVIDER NOTIFICATION
"Hospitalist Cross Cover  3/13/2022    Notified of patient with excruciating abdominal pain unresponsive to the current opiate pain regimen. Chart reviewed.  Imaging earlier in the evening shows bilateral inguinal hernias with bowel loops.  He is now writhing in pain and is very uncomfortable per nursing report    /67 (BP Location: Left arm)   Pulse 77   Temp 97.7  F (36.5  C) (Oral)   Resp 20   Ht 1.651 m (5' 5\")   Wt 59.5 kg (131 lb 3.2 oz)   SpO2 97%   BMI 21.83 kg/m      Plan: Notify general surgery for potential expedited intervention if needed.  I hesitate to add more opiate pain medications if he has having a bowel incarceration or obstruction at this time.    Discussed with bedside nurse.    Isaura Huertas MD        "

## 2022-03-13 NOTE — DISCHARGE INSTRUCTIONS
You have bilateral inguinal hernias. We recommend that you meet with a general surgeon to discuss options for elective repair to avoid any future episodes of incarceration or strangulation. You met Dr. Sultana Aguilar during your hospitalization. She and her partners work at [facility] and operate at [facility]. Our clinic's address is [address] [place] [address] [facility], [address], [place], [address]. Call our office at [phone] to schedule an appointment with Dr. Aguilar or one of her partners.

## 2022-03-13 NOTE — CODE/RAPID RESPONSE
[facility]    RRT Note  3/12/2022   Time Called: 1905    RRT called for: abdominal pain    Assessment & Plan     Abdominal pain secondary to bilateral inguinal hernias  RRT was called for new onset abdominal pain.  Nursing staff report the patient ate his supper and then had a very large bowel movement.  Following the bowel movement he developed acute abdominal pain and it was noted that he had 2 areas in the suprapubic area which were bulging and this was new on assessment for him.  On my exam, the patient does appear acutely distressed in pain.  He is tender in the left lower quadrant with 2 areas in the suprapubic area with bulging and suspicion for inguinal hernias.  He is quite tense even despite administration of IV Dilaudid thus I was unable to reduce this areas of hernia.  CT scan ordered for confirmation.  I discussed CT findings with Dr. Candice Aguilar, general surgery.  CT findings are not concerning for any acute intervention.  She recommends placing in Trendelenburg with appropriate pain medication and with relaxation these hernias may be reducible.  He will be placed on the surgical team rounding list for a.m.  Plan to keep n.p.o. overnight in case any worsening or need for surgical intervention.    INTERVENTIONS:  -Dilaudid 0.2mg IV x1 now and q2hr PRN for pain management  -CT abdomen/pelvis without contrast suspected inguinal hernias  -General Surgery consult  -EKG now  -NPO until evaluation by surgery in AM  -CBC, BMP in AM    At the end of the RRT will remain on station 88.    Discussed with and defer further cares to bedside nursing, johnsoning xiao, and Dr. Aguilar.    Patient's wife, Melissa, barbie.    Interval History     Irvin Bridges is a 89 year old male who was admitted on 3/3/2022 for flash pulmonary edema.    Medical history significant for:   Past Medical History:   Diagnosis Date     Aortic atherosclerosis (H)      Chronic kidney disease, stage 4  (severe) (H)      Chronic low back pain      Chronic systolic heart failure (H)      Dementia (H)      Depression      Macrocytic anemia      Urinary retention      No past surgical history on file.    Code Status: No CPR- Do NOT Intubate    Allergies   Allergies   Allergen Reactions     Lactose Unknown     Memantine Other (See Comments)     Ampicillin Other (See Comments) and Unknown     Unknown, states he hasn't had it in 40 yrs         Rosuvastatin Other (See Comments) and Unknown     myositis           Physical Exam   Vital Signs with Ranges:  Temp:  [97.7  F (36.5  C)-98.7  F (37.1  C)] 97.7  F (36.5  C)  Pulse:  [72-85] 78  Resp:  [16-18] 18  BP: (111-145)/(60-72) 111/69  Cuff Mean (mmHg):  [83] 83  SpO2:  [94 %-96 %] 96 %  I/O last 3 completed shifts:  In: 1828 [P.O.:1828]  Out: 1875 [Urine:1875]    Constitutional: alert, acutely distressed  ENT: mucus membranes moist, EOM intact   Neck: supple, ROM intact  Pulmonary: no increased work of breathing, clear  Cardiovascular: regular rate and rhythm  GI: soft, tender to LLQ, 2 areas of bulging to suprapubic area  Skin/Integumen: no open lesions on exposed skin  Neuro: alert, confused at time, no focal deficits  Psych:  calm  Extremities: no peripheral edema    Data     EKG:  Interpreted by TOM Lyon CNP  Time reviewed: 2000  Symptoms at time of EKG: none   Rhythm: normal sinus   Rate: Normal  Axis: Left Axis Deviation  Ectopy: none  Conduction: normal  ST Segments/ T Waves: TWI lead I, II, aVL, V4-6  Q Waves: none  Comparison to prior: Unchanged    Clinical Impression: no acute changes    ABG:  -  Recent Labs   Lab 03/09/22  0725 03/08/22  1345   PH  --  7.51*   PCO2  --  38   PO2  --  90   HCO3  --  30*   O2PER 0 30       Troponin:    No lab results found.    IMAGING: (X-ray/CT/MRI)   Recent Results (from the past 24 hour(s))   CT Abdomen Pelvis w/o Contrast    Narrative    EXAM: CT ABDOMEN PELVIS W/O CONTRAST  LOCATION: [facility]  HOSPITAL  DATE/TIME: 3/12/2022 7:44 PM    INDICATION: Abdominal pain, hernia suspected  COMPARISON: None.  TECHNIQUE: CT scan of the abdomen and pelvis was performed without IV contrast. Multiplanar reformats were obtained. Dose reduction techniques were used.  CONTRAST: None.    FINDINGS:   LOWER CHEST: Small bilateral pleural effusions and bibasilar atelectasis.    HEPATOBILIARY: Cholecystectomy.    PANCREAS: Normal.    SPLEEN: Normal.    ADRENAL GLANDS: Normal.    KIDNEYS/BLADDER: Multiple right renal cysts, the largest measuring 5 cm. Mild cortical scarring left kidney, otherwise unremarkable. No renal calculi or hydronephrosis. 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 King catheter and a small amount of air   in the bladder.    BOWEL: Bilateral inguinal hernias both containing nondilated small bowel loops. The colon is unremarkable. No wall thickening or inflammatory changes. No obstruction.    LYMPH NODES: Normal.    VASCULATURE: Atherosclerotic disease abdominal aorta and iliac arteries.    PELVIC ORGANS: Normal.    MUSCULOSKELETAL: Advanced degenerative disc disease and hypertrophic changes lower thoracic and lumbar spine.      Impression    IMPRESSION:   1.  Bilateral inguinal hernias, both containing nondilated small bowel loops. No evidence for obstruction.  2.  Large right renal cysts.  3.  Cholecystectomy.  4.  Bilateral pleural effusions and bibasilar atelectasis.         CBC with Diff:  Recent Labs   Lab Test 03/11/22  0648   WBC 10.4   HGB 7.3*   *           Lactic Acid:    Lab Results   Component Value Date    LACT 1.4 03/06/2022           Comprehensive Metabolic Panel:  Recent Labs   Lab 03/12/22  0657 03/11/22  0648 03/09/22  0725 03/08/22  0631 03/07/22  0751 03/06/22  0749   NA  --  134   < > 132*   < > 133   POTASSIUM 3.7 3.9   < > 3.7   < > 3.5   CHLORIDE  --  100   < > 98   < > 96   CO2  --  27   < > 30   < > 29   ANIONGAP  --  7   < > 4   < > 8   GLC  --  105*   < > 114*    < > 133*   BUN  --  41*   < > 50*   < > 47*   CR  --  2.12*   < > 2.22*   < > 2.54*   GFRESTIMATED  --  29*   < > 28*   < > 24*   ABHI  --  8.4*   < > 8.6   < > 8.9   MAG  --   --   --   --   --  2.1   PROTTOTAL  --   --   --  6.1*  --   --    ALBUMIN  --   --   --  2.5*  --   --    BILITOTAL  --   --   --  0.3  --   --    ALKPHOS  --   --   --  63  --   --    AST  --   --   --  17  --   --    ALT  --   --   --  15  --   --     < > = values in this interval not displayed.       INR:    No lab results found.    D-DIMER:  No results found for: DIMER    BNP:  No results found for: BNP    UA:  Recent Labs   Lab 03/06/22  1408   COLOR Light Yellow   APPEARANCE Clear   URINEGLC Negative   URINEBILI Negative   URINEKETONE Negative   SG 1.009   UBLD Small*   URINEPH 6.0   PROTEIN Negative   NITRITE Negative   LEUKEST Negative   RBCU 11*   WBCU 4         Time Spent on this Encounter   I spent 30 minutes of critical care time on the unit/floor managing the care of Irvin Bridges. Upon evaluation, this patient had a high probability of imminent or life-threatening deterioration due to acute abdominal pain, which required my direct attention, intervention, and personal management. 100% of my time was spent at the bedside counseling the patient and/or coordinating care regarding services listed in this note.    Lisa Escoto, APRN CNP

## 2022-03-14 NOTE — PROGRESS NOTES
Patient discharged at 4:00 PM to [facility]. IV was discontinued. Pain at time of discharge was 0/10. Belongings returned to patient.  Discharge instructions and medications reviewed with patient.  Patient verbalized understanding and all questions were answered.  At time of discharge, patient condition was stable and left the unit on stretcher escorted by EMS.

## 2022-03-14 NOTE — CODE/RAPID RESPONSE
[facility]    House KARM[facility] RRT Note  3/14/2022   Time Called: 0820    RRT called for: Chest pain    Assessment & Plan     Transient chest pain possibly 2/2 MSK.  L shoulder pain possibly 2/2 MSK in setting of recent assisted fall.  Systolic murmur.  - Upon arrival, pt lying in bed, eyes closed, in no overt distress, with HR 60s, SBP 90s-100s, RR 10s, O2 sats > 90% on RA, afebrile.  Nursing notes pt reported acute RSB pinpoint chest pain associated with L shoulder pain.  At time of arrival, pt reports no further chest pain.  Pt notes L shoulder pain with movement.  Pt reports no associated nausea, SOB, diaphoresis.        INTERVENTIONS:  - Stat EKG obtained prior to arrival; unchanged from previous  - Will trend troponin levels  - Will initiate pt on telemetry monitoring  - Will defer to rounding hospitalist if L shoulder imaging deemed appropriate  - Notes pt has Imdur due this AM  - Noted Dr. Sandoval, cardiology, consultation note states pt had systolic murmur on 3/4/22    At the end of the RRT pt remains hemodynamically stable, no further chest pain at this time    Discussed with and defer further cares to nursing and hospitalist    Interval History     Irvin Bridges is a 89 year old male who was admitted on 3/3/2022 for SOB.    Medical history significant for: Dementia, CKD IV, aortic atherosclerosis, HFrEF, depression, macrocytic anemia, HTN, chronic low back pain    Code Status: No CPR- Do NOT Intubate    Allergies   Allergies   Allergen Reactions     Lactose Unknown     Memantine Other (See Comments)     Ampicillin Other (See Comments) and Unknown     Unknown, states he hasn't had it in 40 yrs         Rosuvastatin Other (See Comments) and Unknown     myositis           Physical Exam   Vital Signs with Ranges:  Temp:  [98  F (36.7  C)-98.8  F (37.1  C)] 98.5  F (36.9  C)  Pulse:  [64-81] 64  Resp:  [16-18] 18  BP: ()/(39-60) 108/49  SpO2:  [92 %-95 %] 92 %  I/O last 3 completed  shifts:  In: 840 [P.O.:840]  Out: 1800 [Urine:1800]    Constitutional: Pt lying in bed, eyes closed, in no apparent distress, nontoxic appearing  Neck: No upper airway wheezes or stridor noted  Pulmonary: In no apparent respiratory distress, clear to auscultation bilaterally, no crackles or wheezes noted  Cardiovascular: Regular rate and rhythm, normal S1S2, 3/6 systolic murmur noted, no rub or gallop noted  GI: Soft, nondistended, mild tenderness to palpation, no obvious guarding or rebound tenderness noted, active bowel sounds  Skin/Integumen: Warm, dry, pink  Neuro: Awake, alert, clear speech, PERRL, no focal neuro deficit noted  Psych:  Calm  Extremities: No obvious L shoulder bony deformity noted, moving all extremities    Data     EKG:  Interpreted by TOM Dave CNP  Time reviewed: 0713  Symptoms at time of EKG: Chest and L shoulder pain   Rhythm: normal sinus   Rate: Normal  Axis: Left Axis Deviation  Ectopy: premature ventricular contractions (infrequent)  Conduction: normal  ST Segments/ T Waves: Non-specific ST-T wave changes and T wave inversion I, aVL, V4, V5 and V6  Q Waves: III and aVf  Comparison to prior: Unchanged from 3/12/22    Clinical Impression: no acute changes and non-specific EKG      Troponin:    No lab results found.      Time Spent on this Encounter   I spent 10 minutes on the unit/floor managing the care of Irvin ZORAIDA Bridges. Over 50% of my time was spent counseling the patient and/or coordinating care regarding services listed in this note.    TOM Dave [facility]

## 2022-03-14 NOTE — PROGRESS NOTES
BRIEF HOUSE OFFICER NOTE:    Fall  Patient has an assisted fall to his knees. Nursing staff report the patient set of his bed alarm and on their arrival bedside he was already starting to get out of bed without assistance. He was assisted by staff in a controlled manner to his knees. He denies any injury and no obvious injury on exam.    TOM Lyon CNP  Text Page

## 2022-03-14 NOTE — PROGRESS NOTES
Care Management Follow Up    Length of Stay (days): 10    Expected Discharge Date: 03/14/2022     Concerns to be Addressed: all concerns addressed in this encounter     Patient plan of care discussed at interdisciplinary rounds: Yes    Anticipated Discharge Disposition: Skilled Nursing Facilty     Anticipated Discharge Services: None  Anticipated Discharge DME: None    Patient/family educated on Medicare website which has current facility and service quality ratings: no  Education Provided on the Discharge Plan:    Patient/Family in Agreement with the Plan: yes    Referrals Placed by CM/SW: Post Acute Facilities  Private pay costs discussed: Not applicable    Additional Information:  Writer confirmed with Daiana in admissions at [facility] that they can accept patient today at 1100. Writer called [facility] Medicare advantage, spoke with Tiffany and got confirmation for start of prior auth and is [id number]. Writer spoke with Sarika at [facility] and did clinicals over the phone and got authorization for SNF stay from 3/14/22-3/23/22. Authorization number is [id number].     Writer updated Daiana of transport time. Writer confirmed with spouse and is accepting of [facility] of placement and for stretcher ride. Writer heard from MD and confirmed patient is not ready for TCU and said potentially may be ready this afternoon. Writer called [facility] [employer] transport, spoke to mitul and rescheduled the ride for today at 1600. Writer updated MD of new ride time. Writer called and updated Daiana at [facility] of new ride time. Writer updated spouse of ride time and she is in agreement of the 1600 ride time. Writer updated HUC, Writer completed PAS, faxed and placed on chart. Writer completed PCS form (need for oxygen, dementia, assist of two), faxed and placed on chart. Writer updated bedside nurse.      Tentative stretcher ride at 1600 to [facility] pending medical clearance.     PAS-RR    D: Per DHS regulation,  SW completed and submitted PAS-RR to [place] [employer]ging Direct Connect via the Senior LinkAge Line.  PAS-RR confirmation # is : [id number]    I: SW spoke with patient's spouse  and they are aware a PAS-RR has been submitted.  SW reviewed with patient's spouse that they may be contacted for a follow up appointment within 10 days of hospital discharge if their SNF stay is < 30 days.  Contact information for Senior LinkAge Line was also provided.    A: patient's spouse  verbalized understanding.    P: Further questions may be directed to [facility] LinkAge Line at #[phone], option #4 for PAS-RR staff.      ABEBA Delgado, LGSW   Social Work   [facility]

## 2022-03-14 NOTE — PLAN OF CARE
Goal Outcome Evaluation:        Pt is A & O to self. Pleasant and cooperative. VSS on 1L O2 via NC. No abdominal pain or discomfort reported. On 2 gm Na diet, 2000 mL fluid restriction, tolerating well. Up A2 GB/W. King patent, adequate output - meatal redness continues. R & L PIV SL. Discharge pending Monday to [facility] via stretcher ride -scheduled for 11:00.

## 2022-03-14 NOTE — PLAN OF CARE
Pt is alert, only oriented to self. Restless overnight, needed frequent reorientation. VSS on RA. Denies pain. BS active, no BM this shift. King patent with good UOP. Redness to meatus. Denied discomfort. Blanchable redness to coccyx, mepilex in place. T/R Q2H. Up A2 with GB+W. On 2g NA diet with 2L fluid restriction, 400mL this shift. Dishcharge to [facility] today via stretcher @ 11AM.

## 2022-03-14 NOTE — PROGRESS NOTES
CLINICAL NUTRITION SERVICES - REASSESSMENT NOTE      Malnutrition: (3/5)  % Weight Loss:  Up to 5% in 1 month (moderate malnutrition)  % Intake:  <75% for > 7 days (moderate malnutrition)  Subcutaneous Fat Loss:  Orbital region mild depletion and Upper arm region mild depletion  Muscle Loss:  Temporal region mild depletion and Clavicle bone region mild depletion  Fluid Retention:  None noted     Malnutrition Diagnosis: Moderate malnutrition  In Context of:  Acute on Chronic illness or disease       EVALUATION OF PROGRESS TOWARD GOALS   Diet:    2gm Na, 2000 mL fluid restriction      Intake/Tolerance:    Chart reviewed  Pt tolerating po  Flowsheets reflect 100% intake  Pt has been ordering meals TID    Stopped by to see pt this afternoon  Asking where his wife is  Lunch tray delivered during visit        NEW FINDINGS:   Possible TCU today      Previous Goals (3/10):   Pt to consume at least 50% of meals TID  Evaluation: Met    Previous Nutrition Diagnosis (3/10):   Inadequate oral intake related to fluctuations in mental status, lethargy, reported reduced appetite as evidenced by spouse concerned that pt is eating less than baseline since last hospitalization, varying intakes this admission  Evaluation: Improving          CURRENT NUTRITION DIAGNOSIS  No nutrition diagnosis identified at this time     INTERVENTIONS  Recommendations / Nutrition Prescription  2gm Na, 2000 mL fluid restriction      Goals  Pt to consume 75% meals TID      MONITORING AND EVALUATION:  Progress towards goals will be monitored and evaluated per protocol and Practice Guidelines

## 2022-03-14 NOTE — PROGRESS NOTES
Care Management Discharge Note    Discharge Date: 03/14/2022       Discharge Disposition: Skilled Nursing Facilty    Discharge Services: None    Discharge DME: None    Discharge Transportation: other (see comments) ([facility])    Private pay costs discussed: transportation costs    PAS Confirmation Code: [id number]  Patient/family educated on Medicare website which has current facility and service quality ratings: no    Education Provided on the Discharge Plan:    Persons Notified of Discharge Plans: spouse, nurse, MD, HUC, TCU  Patient/Family in Agreement with the Plan: yes    Handoff Referral Completed: Yes    Additional Information:  Discharge orders faxed. Discharging at 1600 via stretcher.       ABEBA Delgado, LGSW   Social Work   [facility]

## 2022-03-14 NOTE — DISCHARGE SUMMARY
[facility]  Hospitalist Discharge Summary      Date of Admission:  3/3/2022  Date of Discharge:  3/14/2022  Discharging Provider: Wang Garner DO  Discharge Service: Hospitalist Service    Discharge Diagnoses   Acute on chronic systolic congestive heart failure (HFrEF)  Acute hypoxic respiratory failure secondary to flash pulmonary edema   Hypertension    Bilateral inguinal hernias  Possible sundowning/hospitial delirium   Dementia with behavioral disturbance  Acute on chronic macrocytic anemia  Urinary retention  Meatus bleeding secondary to trauma   Chronic kidney disease, stage 4  Mild hyponatremia  Depression   Chronic low back pain   Moderate malnutrition in setting of acute and chronic illness      Follow-ups Needed After Discharge   Follow-up Appointments     Follow Up and recommended labs and tests      Follow up with shelter physician.  The following labs/tests are   recommended: BMP in 1 week.  Follow up with PCP 1-2 weeks after TCU discharge           Unresulted Labs Ordered in the Past 30 Days of this Admission     No orders found from 2/1/2022 to 3/4/2022.        Discharge Disposition   Discharged to short-term care facility  Condition at discharge: Stable    Hospital Course   Irvin Bridges is an 89 year-old male with history of dementia, chronic kidney disease stage 4 and aortic atherosclerosis who presents via EMS with shortness of breath. Admitted 3/3/2022.     Acute on chronic systolic congestive heart failure (HFrEF)  Acute hypoxic respiratory failure secondary to flash pulmonary edema   Hypertension   Brought in to ED on 2L nasal cannula by EMS, though per notes not clear he was ever hypoxic prior to this  Following episode of agitation and administration of sedatives, was placed on non-rebreather for reported oxygen saturations of 80%. Initial workup consistent with heart failure exacerbation with pulmonary edema - possibly worsened by blood transfusion.    >Recently admitted 2/24-3/1/22 for acute hypoxic respiratory failure due to acute systolic CHF with pulmonary edema, probable type II NSTEMI for above. Medications adjusted by cardiology including initiating hydralazine and Imdur, discharged on PO furosemide  >Echocardiogram 2/25 with EF 35-40%, early diastolic dysfunction   >PTA regimen amlodipine, carvedilol, furosemide, hydralazine, Imdur   - initially diuresed with IV lasix, then held.  PO lasix resumed 3/10.   - Daily weights, strict I/O - 3/13 net neg 10.5L, wts variable  - procal rechecked and decreased at 0.2 from 0.5, less useful in renal failure.  Remains nontoxic. BP and HR WNL   - 3/8 - 3/12 weaned from bipap to RA at times, prn O2 via nasal cannula.  Slowly reintroduced his cardiac meds including diuretic.   -- minimal O2 needs, ok on RA at times, OOB/IS     Bilateral inguinal hernias  3/12 evening - RRT for new, sudden onset lower abdo pain after large BM with help of suppository. CT did not show signs requiring urgent surgical intervention but pain persisted, Gen surg evaluated at bedside in middle of night and was able to reduce bilateral hernias with pt in Trendelenburg. Pain subsided. Kept NPO for surgery to round on in AM 3/13.      Increased somnolence, apneic spells AM 3/8 - improved/resolving  Possible sundowning/hospitial delirium   Unclear exact etiology, likely multifactorial with advanced age dementia in hospital and decreased PO intake as well as receiving zyprexa in middle of night. ABG did not show hypercapnia.  LFT, ammonia unremarkable. No focal neuro changes. Wife agreed conservative work up given overall picture. Slowly improved and as of 3/12 nearly back to baseline - sitting up in chair eating, conversing with spouse.  - monitor, reorient as needed, encourage normal sleep/wake cycle     Dementia with behavioral disturbance  Calm and cooperative. Has been needing a sitter, however. Needs to be ok without sitter prior to [facility] accepting [facility].  - Delirum bundle.  - Prn meds.     Acute on chronic macrocytic anemia  *CBC drawn at TCU initially at 6.5 g/dl, recheck here 6.8 g/dl. Reportedly had some hematuria at TCU with clot noted at King tip, that resolved with removal.   *Hemoglobin in low 7 g/dls during most recent hospitalization. Iron studies not suggestive of deficiency, folate and B12 within normal limits.    - Transfused 1 unit in ED and 3/4 Hgb 8.4--9.1--8.6 --> 8.2 --> 7.1  --> 7.3 -> 7.3     Urinary retention  Meatus bleeding secondary to trauma   King placed 2/26 due to retention, discharged with King with plan for TOV at TCU.  - Pulled King in TCU with some bleeding from balloon trauma   - King replaced in ED and bleeding resolved  - Continue tamsulosin     Chronic kidney disease, stage 4  Mild hyponatremia - stable  Baseline creatinine 2.3-2.4 with GFR in 20s. Creatinine 2.4 on admission--2.04--2.54  - Lasix held evening 3/6 - creat 2.49 on 3/7, fairly stable. Sodium 130 3/7  - 3/8 Na 132, creat 2.2 (improved/stable)  - 3/9 Na 138, creat 1.9 -- about 2 on 3/10 --> 2.1  3/11 --> 2.1 3/13  - PTA lasix resumed 3/10  - follow BMP periodically     Depression  - Continue prior to admission escitalopram     Chronic low back pain  No acute change.   - Continue scheduled acetaminophen      Moderate malnutrition in setting of acute and chronic illness  % Weight Loss:  Up to 5% in 1 month (moderate malnutrition)  % Intake:  <75% for > 7 days (moderate malnutrition)  Subcutaneous Fat Loss:  Orbital region mild depletion and Upper arm region mild depletion  Muscle Loss:  Temporal region mild depletion and Clavicle bone region mild depletion  2g Na diet  - Appreciate Nutrition assistance  - Pt reviewed protein sources with dietitian  - Pt dislikes protein supplements per spouse (does drink milkshakes)   - Thera vit M daily       Consultations This Hospital Stay   CORE CLINIC EVALUATION IP CONSULT  OCCUPATIONAL THERAPY ADULT IP  CONSULT  CARE MANAGEMENT / SOCIAL WORK IP CONSULT  CARDIOLOGY IP CONSULT  CARE MANAGEMENT / SOCIAL WORK IP CONSULT  PHYSICAL THERAPY ADULT IP CONSULT  SURGERY GENERAL IP CONSULT  PHYSICAL THERAPY ADULT IP CONSULT  OCCUPATIONAL THERAPY ADULT IP CONSULT  NUTRITION SERVICES ADULT IP CONSULT    Code Status   No CPR- Do NOT Intubate    Time Spent on this Encounter   I, Wang Garner DO, personally saw the patient today and spent greater than 30 minutes discharging this patient.       Wang Garner DO  [place] [id number] ONCOLOGY  [facility]  [facility] [address]  Phone: [phone]  ______________________________________________________________________    Physical Exam   Vital Signs: Temp: 98.4  F (36.9  C) Temp src: Axillary BP: 111/48 Pulse: 61   Resp: 18 SpO2: 95 % O2 Device: None (Room air) Oxygen Delivery: 1 LPM  Weight: 131 lbs 3.2 oz  General Appearance: Resting comfortably. NAD   Respiratory: Clear to auscultation.  No respiratory distress  Cardiovascular: RRR.  Faint murmur noted  GI: Soft.  Non-distended  Skin: No obvious rashes or cyanosis  Other: Alert.  Moving all extremities grossly         Primary Care Physician   Joselito CONWAY Post    Discharge Orders      General info for SNF    Length of Stay Estimate: Short Term Care: Estimated # of Days <30  Condition at Discharge: Stable  Level of care:skilled   Rehabilitation Potential: Fair  Admission H&P remains valid and up-to-date: Yes  Recent Chemotherapy: N/A  Use Nursing Home Standing Orders: Yes     Mantoux instructions    Give two-step Mantoux (PPD) Per Facility Policy Yes     Follow Up and recommended labs and tests    Follow up with [facility] physician.  The following labs/tests are recommended: BMP in 1 week.  Follow up with PCP 1-2 weeks after TCU discharge     Reason for your hospital stay    CHF     Activity - Up with nursing assistance     Physical Therapy Adult Consult    Evaluate and treat as clinically  indicated.    Reason:  Deconditioning     Occupational Therapy Adult Consult    Evaluate and treat as clinically indicated.    Reason:  Deconditioning     Diet    Follow this diet upon discharge: Orders Placed This Encounter      Fluid restriction 2000 ML FLUID      Combination Diet 2 gm NA Diet       Significant Results and Procedures   Most Recent 3 CBC's:Recent Labs   Lab Test 03/13/22  0742 03/11/22  0648 03/10/22  0705   WBC 7.9 10.4 5.7   HGB 7.3* 7.3* 7.1*   * 102* 104*    201 189     Most Recent 3 BMP's:Recent Labs   Lab Test 03/14/22  0824 03/13/22  1530 03/13/22  0742 03/12/22  0657 03/11/22  0648 03/10/22  0705   NA  --   --  135  --  134 137   POTASSIUM  --  3.9 3.4 3.7 3.9 3.9   CHLORIDE  --   --  101  --  100 104   CO2  --   --  27  --  27 28   BUN  --   --  47*  --  41* 40*   CR  --   --  2.13*  --  2.12* 1.98*   ANIONGAP  --   --  7  --  7 5   ABHI  --   --  8.8  --  8.4* 8.5   *  --  110*  --  105* 94   ,   Results for orders placed or performed during the hospital encounter of 03/03/22   XR Chest Port 1 View    Narrative    EXAM: XR CHEST PORT 1 VIEW  LOCATION: [facility]  DATE/TIME: 3/4/2022 1:15 AM    INDICATION: Hypoxia.  CHF  COMPARISON: 02/27/2022      Impression    IMPRESSION: Worsening in engorged indistinct central pulmonary vasculature with increased interstitial markings consistent with worsening interstitial pulmonary edema. Small bilateral pleural effusions. No pneumothorax. Stable borderline enlarged cardiac   silhouette.   XR Chest Port 1 View    Narrative    CHEST ONE VIEW PORTABLE  3/9/2022 2:20 PM       INDICATION: Shortness of breath. Hypoxia.    COMPARISON: 3/4/2022       Impression    IMPRESSION: Improved pulmonary vascular congestion. Small right  pleural effusion. Minimal infiltrate or atelectasis in the lung bases,  similar to previous.    TIM ORTIZ MD         SYSTEM ID:  [id number]   CT Abdomen Pelvis w/o Contrast     Narrative    EXAM: CT ABDOMEN PELVIS W/O CONTRAST  LOCATION: [facility]  DATE/TIME: 3/12/2022 7:44 PM    INDICATION: Abdominal pain, hernia suspected  COMPARISON: None.  TECHNIQUE: CT scan of the abdomen and pelvis was performed without IV contrast. Multiplanar reformats were obtained. Dose reduction techniques were used.  CONTRAST: None.    FINDINGS:   LOWER CHEST: Small bilateral pleural effusions and bibasilar atelectasis.    HEPATOBILIARY: Cholecystectomy.    PANCREAS: Normal.    SPLEEN: Normal.    ADRENAL GLANDS: Normal.    KIDNEYS/BLADDER: Multiple right renal cysts, the largest measuring 5 cm. Mild cortical scarring left kidney, otherwise unremarkable. No renal calculi or hydronephrosis. 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 King catheter and a small amount of air   in the bladder.    BOWEL: Bilateral inguinal hernias both containing nondilated small bowel loops. The colon is unremarkable. No wall thickening or inflammatory changes. No obstruction.    LYMPH NODES: Normal.    VASCULATURE: Atherosclerotic disease abdominal aorta and iliac arteries.    PELVIC ORGANS: Normal.    MUSCULOSKELETAL: Advanced degenerative disc disease and hypertrophic changes lower thoracic and lumbar spine.      Impression    IMPRESSION:   1.  Bilateral inguinal hernias, both containing nondilated small bowel loops. No evidence for obstruction.  2.  Large right renal cysts.  3.  Cholecystectomy.  4.  Bilateral pleural effusions and bibasilar atelectasis.           Discharge Medications   Current Discharge Medication List      CONTINUE these medications which have CHANGED    Details   furosemide (LASIX) 40 MG tablet Take 1 tablet (40 mg) by mouth 2 times daily    Associated Diagnoses: Acute on chronic systolic congestive heart failure (H)         CONTINUE these medications which have NOT CHANGED    Details   acetaminophen (TYLENOL) 500 MG tablet Take 1,000 mg by mouth 3 times daily       amLODIPine  (NORVASC) 5 MG tablet Take 1 tablet (5 mg) by mouth daily    Associated Diagnoses: Acute systolic congestive heart failure (H)      calcium carbonate (TUMS) 500 MG chewable tablet Take 1 chew tab by mouth 2 times daily as needed for heartburn      carvedilol (COREG) 12.5 MG tablet Take 12.5 mg by mouth 2 times daily      escitalopram (LEXAPRO) 5 MG tablet Take 5 mg by mouth daily      hydrALAZINE (APRESOLINE) 25 MG tablet Take 1 tablet (25 mg) by mouth every 8 hours    Associated Diagnoses: Acute systolic congestive heart failure (H)      isosorbide mononitrate (IMDUR) 30 MG 24 hr tablet Take 1 tablet (30 mg) by mouth daily    Associated Diagnoses: Acute systolic congestive heart failure (H)      nitroGLYcerin (NITROSTAT) 0.4 MG sublingual tablet For chest pain place 1 tablet under the tongue every 5 minutes for 3 doses. If symptoms persist 5 minutes after 1st dose call 911.    Associated Diagnoses: Acute systolic congestive heart failure (H)      polyethylene glycol-propylene glycol (SYSTANE ULTRA) 0.4-0.3 % SOLN ophthalmic solution Place 1 drop into both eyes every hour as needed for dry eyes      potassium chloride ER (KLOR-CON M) 20 MEQ CR tablet Take 20 mEq by mouth 2 times daily      sennosides (SENOKOT) 8.6 MG tablet Take 1-2 tablets by mouth 2 times daily as needed for constipation    Associated Diagnoses: Acute systolic congestive heart failure (H)      simethicone (MYLICON) 80 MG chewable tablet Take 1 tablet (80 mg) by mouth every 6 hours as needed for cramping    Associated Diagnoses: Acute systolic congestive heart failure (H)      tamsulosin (FLOMAX) 0.4 MG capsule Take 1 capsule (0.4 mg) by mouth daily    Associated Diagnoses: Acute systolic congestive heart failure (H)      vitamin B-12 (CYANOCOBALAMIN) 1000 MCG tablet Take 1 tablet by mouth daily           Allergies   Allergies   Allergen Reactions     Lactose Unknown     Memantine Other (See Comments)     Ampicillin Other (See Comments) and Unknown      Unknown, states he hasn't had it in 40 yrs         Rosuvastatin Other (See Comments) and Unknown     myositis

## 2022-03-14 NOTE — PLAN OF CARE
Patient is alert to self only. Orientation fluctuates, needs frequent re-orientation. VSS on room air. Did endorse some right sided chest pain and left shoulder pain this AM - cardiac work up negative. Tylenol given for back pain. Repositioned with improvement. A2, pivot to chair. Tolerating 2g Na diet, 2,000mL FR. King patent. No BM today. Plan to discharge with stretcher ride at 4PM today to [facility].

## 2022-03-15 NOTE — PROGRESS NOTES
Clinic Care Coordination Contact    Background: Care Coordination referral placed from [facility] discharge report for reason of patient meeting criteria for a TCM outreach call by [facility] team.    Assessment: Upon chart review, [facility] Team member will cancel/close the referral for TCM outreach due to reason below:    Called Phone no in chart, stated that: Patient has discharged to a Group home, Memory Care or Nursing Home    Plan: Care Coordination referral for TCM outreach canceled.      DELBERT Magana  [facility]

## 2022-03-15 NOTE — PLAN OF CARE
Occupational Therapy Discharge Summary    Reason for therapy discharge:    Discharged to transitional care facility.    Progress towards therapy goal(s). See goals on Care Plan in [facility] electronic health record for goal details.  Goals not met; in progress.    Therapy recommendation(s):    Continued therapy is recommended.  Rationale/Recommendations:  pt is below baseline, L knee pain and L shoulder pain signficantly reducing functional mobilty. would benefit from TCU prior to returning home with his wife.

## 2023-03-29 NOTE — PLAN OF CARE
Physical Therapy Discharge Summary    Reason for therapy discharge:    Discharged to transitional care facility.    Progress towards therapy goal(s). See goals on Care Plan in [facility] electronic health record for goal details.  Goals not met.  Barriers to achieving goals:   discharge from facility.    Therapy recommendation(s):    Continued therapy is recommended.  Rationale/Recommendations:  Pt is significantly below baseline functional mobility. Normally lives in condo with his wife, ambulates with wh walker, spouse has cleaning service, does cooking etc. Patient has dementia at baseline. Currently, pt is deconditioned and weak, resulting in limited functional mobility. Requiring assist of 1 for all functional mobility;  recommending TCU for progression of strength and functional mobility..         done

## 2023-06-07 NOTE — PROCEDURE: MOHS SURGERY
Walking Mastoid Interpolation Flap Text: A decision was made to reconstruct the defect utilizing an interpolation axial flap and a staged reconstruction.  A telfa template was made of the defect.  This telfa template was then used to outline the mastoid interpolation flap.  The donor area for the pedicle flap was then injected with anesthesia.  The flap was excised through the skin and subcutaneous tissue down to the layer of the underlying musculature.  The pedicle flap was carefully excised within this deep plane to maintain its blood supply.  The edges of the donor site were undermined.   The donor site was closed in a primary fashion.  The pedicle was then rotated into position and sutured.  Once the tube was sutured into place, adequate blood supply was confirmed with blanching and refill.  The pedicle was then wrapped with xeroform gauze and dressed appropriately with a telfa and gauze bandage to ensure continued blood supply and protect the attached pedicle.